# Patient Record
Sex: MALE | Race: WHITE | NOT HISPANIC OR LATINO | Employment: OTHER | URBAN - METROPOLITAN AREA
[De-identification: names, ages, dates, MRNs, and addresses within clinical notes are randomized per-mention and may not be internally consistent; named-entity substitution may affect disease eponyms.]

---

## 2017-01-03 ENCOUNTER — HISTORICAL (OUTPATIENT)
Dept: LAB | Facility: HOSPITAL | Age: 67
End: 2017-01-03

## 2017-03-06 ENCOUNTER — HISTORICAL (OUTPATIENT)
Dept: RADIOLOGY | Facility: HOSPITAL | Age: 67
End: 2017-03-06

## 2017-03-07 ENCOUNTER — HISTORICAL (OUTPATIENT)
Dept: RADIOLOGY | Facility: HOSPITAL | Age: 67
End: 2017-03-07

## 2017-03-09 ENCOUNTER — HISTORICAL (OUTPATIENT)
Dept: LAB | Facility: HOSPITAL | Age: 67
End: 2017-03-09

## 2017-03-10 ENCOUNTER — HISTORICAL (OUTPATIENT)
Dept: CARDIOLOGY | Facility: HOSPITAL | Age: 67
End: 2017-03-10

## 2018-01-29 ENCOUNTER — HISTORICAL (OUTPATIENT)
Dept: LAB | Facility: HOSPITAL | Age: 68
End: 2018-01-29

## 2018-01-29 LAB
ABS NEUT (OLG): 4.8 X10(3)/MCL (ref 2.1–9.2)
ALBUMIN SERPL-MCNC: 3.9 GM/DL (ref 3.4–5)
ALP SERPL-CCNC: 64 UNIT/L (ref 46–116)
ALT SERPL-CCNC: 34 UNIT/L (ref 12–78)
ANISOCYTOSIS BLD QL SMEAR: NORMAL
AST SERPL-CCNC: 15 UNIT/L (ref 15–37)
BILIRUB SERPL-MCNC: 0.4 MG/DL (ref 0.2–1)
BILIRUBIN DIRECT+TOT PNL SERPL-MCNC: 0.17 MG/DL (ref 0–0.2)
BILIRUBIN DIRECT+TOT PNL SERPL-MCNC: 0.28 MG/DL (ref 0–0.8)
BUN SERPL-MCNC: 15.4 MG/DL (ref 7–18)
CALCIUM SERPL-MCNC: 9.2 MG/DL (ref 8.5–10.1)
CHLORIDE SERPL-SCNC: 103 MMOL/L (ref 98–107)
CHOLEST SERPL-MCNC: 84 MG/DL (ref 0–200)
CHOLEST/HDLC SERPL: 2.5 {RATIO} (ref 0–5)
CO2 SERPL-SCNC: 26.8 MMOL/L (ref 21–32)
CREAT SERPL-MCNC: 0.9 MG/DL (ref 0.6–1.3)
ERYTHROCYTE [DISTWIDTH] IN BLOOD BY AUTOMATED COUNT: 20.2 % (ref 11.5–17)
EST. AVERAGE GLUCOSE BLD GHB EST-MCNC: 131 MG/DL
FT4I SERPL CALC-MCNC: 1.75
GLUCOSE SERPL-MCNC: 97 MG/DL (ref 74–106)
HBA1C MFR BLD: 6.2 % (ref 4.5–6.2)
HCT VFR BLD AUTO: 32.5 % (ref 42–52)
HDLC SERPL-MCNC: 33 MG/DL (ref 40–60)
HGB BLD-MCNC: 9.8 GM/DL (ref 14–18)
HYPOCHROMIA BLD QL SMEAR: NORMAL
IRON SERPL-MCNC: 19 MCG/DL (ref 50–175)
LDLC SERPL CALC-MCNC: 41 MG/DL (ref 0–129)
MCH RBC QN AUTO: 21.6 PG (ref 27–31)
MCHC RBC AUTO-ENTMCNC: 30.1 GM/DL (ref 33–36)
MCV RBC AUTO: 71.7 FL (ref 80–94)
PLATELET # BLD AUTO: 304 X10(3)/MCL (ref 130–400)
PLATELET # BLD EST: ADEQUATE 10*3/UL
PMV BLD AUTO: 8.1 FL (ref 7.4–10.4)
POTASSIUM SERPL-SCNC: 4.5 MMOL/L (ref 3.5–5.1)
PROT SERPL-MCNC: 7.4 GM/DL (ref 6.4–8.2)
RBC # BLD AUTO: 4.53 X10(6)/MCL (ref 4.7–6.1)
SODIUM SERPL-SCNC: 141 MMOL/L (ref 136–145)
T3RU NFR SERPL: 33 % (ref 31–39)
T4 SERPL-MCNC: 5.3 MCG/DL (ref 4.7–13.3)
TRIGL SERPL-MCNC: 51 MG/DL
TSH SERPL-ACNC: 1.21 MIU/ML (ref 0.36–3.74)
VLDLC SERPL CALC-MCNC: 10 MG/DL
WBC # SPEC AUTO: 7.7 X10(3)/MCL (ref 4.5–11.5)

## 2018-04-16 ENCOUNTER — HISTORICAL (OUTPATIENT)
Dept: RESPIRATORY THERAPY | Facility: HOSPITAL | Age: 68
End: 2018-04-16

## 2018-04-24 ENCOUNTER — HISTORICAL (OUTPATIENT)
Dept: RADIOLOGY | Facility: HOSPITAL | Age: 68
End: 2018-04-24

## 2018-05-01 ENCOUNTER — HISTORICAL (OUTPATIENT)
Dept: LAB | Facility: HOSPITAL | Age: 68
End: 2018-05-01

## 2018-05-01 LAB
ERYTHROCYTE [DISTWIDTH] IN BLOOD BY AUTOMATED COUNT: 22.1 % (ref 11.5–17)
HCT VFR BLD AUTO: 40.1 % (ref 42–52)
HGB BLD-MCNC: 12.9 GM/DL (ref 14–18)
IRON SERPL-MCNC: 31 MCG/DL (ref 50–175)
MCH RBC QN AUTO: 25.6 PG (ref 27–31)
MCHC RBC AUTO-ENTMCNC: 32.1 GM/DL (ref 33–36)
MCV RBC AUTO: 79.7 FL (ref 80–94)
PLATELET # BLD AUTO: 207 X10(3)/MCL (ref 130–400)
PMV BLD AUTO: 8.8 FL (ref 7.4–10.4)
RBC # BLD AUTO: 5.04 X10(6)/MCL (ref 4.7–6.1)
WBC # SPEC AUTO: 9.7 X10(3)/MCL (ref 4.5–11.5)

## 2018-08-20 ENCOUNTER — HISTORICAL (OUTPATIENT)
Dept: RADIOLOGY | Facility: HOSPITAL | Age: 68
End: 2018-08-20

## 2018-09-06 ENCOUNTER — HISTORICAL (OUTPATIENT)
Dept: CARDIOLOGY | Facility: HOSPITAL | Age: 68
End: 2018-09-06

## 2018-09-06 LAB
ABS NEUT (OLG): 5.05 X10(3)/MCL (ref 2.1–9.2)
APTT PPP: 28 SECOND(S) (ref 24.8–36.9)
BASOPHILS # BLD AUTO: 0.1 X10(3)/MCL (ref 0–0.2)
BASOPHILS NFR BLD AUTO: 1 %
BUN SERPL-MCNC: 15 MG/DL (ref 7–18)
CALCIUM SERPL-MCNC: 8.8 MG/DL (ref 8.5–10.1)
CHLORIDE SERPL-SCNC: 100 MMOL/L (ref 98–107)
CO2 SERPL-SCNC: 33 MMOL/L (ref 21–32)
CREAT SERPL-MCNC: 0.85 MG/DL (ref 0.7–1.3)
CREAT/UREA NIT SERPL: 17.6
EOSINOPHIL # BLD AUTO: 0.2 X10(3)/MCL (ref 0–0.9)
EOSINOPHIL NFR BLD AUTO: 2 %
ERYTHROCYTE [DISTWIDTH] IN BLOOD BY AUTOMATED COUNT: 17.5 % (ref 11.5–17)
GLUCOSE SERPL-MCNC: 121 MG/DL (ref 74–106)
HCT VFR BLD AUTO: 44.5 % (ref 42–52)
HGB BLD-MCNC: 13.4 GM/DL (ref 14–18)
INR PPP: 1.04 (ref 0–1.27)
LYMPHOCYTES # BLD AUTO: 2.1 X10(3)/MCL (ref 0.6–4.6)
LYMPHOCYTES NFR BLD AUTO: 26 %
MCH RBC QN AUTO: 28.9 PG (ref 27–31)
MCHC RBC AUTO-ENTMCNC: 30.1 GM/DL (ref 33–36)
MCV RBC AUTO: 95.9 FL (ref 80–94)
MONOCYTES # BLD AUTO: 0.6 X10(3)/MCL (ref 0.1–1.3)
MONOCYTES NFR BLD AUTO: 8 %
NEUTROPHILS # BLD AUTO: 5.05 X10(3)/MCL (ref 1.4–7.9)
NEUTROPHILS NFR BLD AUTO: 62 %
PLATELET # BLD AUTO: 215 X10(3)/MCL (ref 130–400)
PMV BLD AUTO: 9.9 FL (ref 9.4–12.4)
POTASSIUM SERPL-SCNC: 4 MMOL/L (ref 3.5–5.1)
PROTHROMBIN TIME: 13.9 SECOND(S) (ref 12.2–14.7)
RBC # BLD AUTO: 4.64 X10(6)/MCL (ref 4.7–6.1)
SODIUM SERPL-SCNC: 140 MMOL/L (ref 136–145)
WBC # SPEC AUTO: 8.1 X10(3)/MCL (ref 4.5–11.5)

## 2019-03-15 ENCOUNTER — HISTORICAL (OUTPATIENT)
Dept: LAB | Facility: HOSPITAL | Age: 69
End: 2019-03-15

## 2019-03-15 LAB
ALBUMIN SERPL-MCNC: 3.8 GM/DL (ref 3.4–5)
ALP SERPL-CCNC: 62 UNIT/L (ref 46–116)
ALT SERPL-CCNC: 44 UNIT/L (ref 12–78)
AST SERPL-CCNC: 13 UNIT/L (ref 15–37)
BILIRUB SERPL-MCNC: 0.4 MG/DL (ref 0.2–1)
BILIRUBIN DIRECT+TOT PNL SERPL-MCNC: 0.16 MG/DL (ref 0–0.2)
BILIRUBIN DIRECT+TOT PNL SERPL-MCNC: 0.24 MG/DL (ref 0–0.8)
BUN SERPL-MCNC: 12.2 MG/DL (ref 7–18)
CALCIUM SERPL-MCNC: 9.8 MG/DL (ref 8.5–10.1)
CHLORIDE SERPL-SCNC: 103 MMOL/L (ref 98–107)
CHOLEST SERPL-MCNC: 99 MG/DL (ref 0–200)
CHOLEST/HDLC SERPL: 2.9 {RATIO} (ref 0–5)
CO2 SERPL-SCNC: 29.5 MMOL/L (ref 21–32)
CREAT SERPL-MCNC: 0.87 MG/DL (ref 0.6–1.3)
CREAT/UREA NIT SERPL: 14
ERYTHROCYTE [DISTWIDTH] IN BLOOD BY AUTOMATED COUNT: 15.5 % (ref 11.5–17)
EST. AVERAGE GLUCOSE BLD GHB EST-MCNC: 140 MG/DL
FT4I SERPL CALC-MCNC: 2.05
GLUCOSE SERPL-MCNC: 106 MG/DL (ref 74–106)
HBA1C MFR BLD: 6.5 % (ref 4.5–6.2)
HCT VFR BLD AUTO: 44 % (ref 42–52)
HDLC SERPL-MCNC: 34 MG/DL (ref 40–60)
HGB BLD-MCNC: 14.8 GM/DL (ref 14–18)
LDLC SERPL CALC-MCNC: 56 MG/DL (ref 0–129)
MCH RBC QN AUTO: 31.9 PG (ref 27–31)
MCHC RBC AUTO-ENTMCNC: 33.6 GM/DL (ref 33–36)
MCV RBC AUTO: 94.8 FL (ref 80–94)
PLATELET # BLD AUTO: 163 X10(3)/MCL (ref 130–400)
PMV BLD AUTO: 10.3 FL (ref 9.4–12.4)
POTASSIUM SERPL-SCNC: 4.5 MMOL/L (ref 3.5–5.1)
PROT SERPL-MCNC: 7.3 GM/DL (ref 6.4–8.2)
RBC # BLD AUTO: 4.64 X10(6)/MCL (ref 4.7–6.1)
SODIUM SERPL-SCNC: 142 MMOL/L (ref 136–145)
T3RU NFR SERPL: 33 % (ref 31–39)
T4 SERPL-MCNC: 6.2 MCG/DL (ref 4.7–13.3)
TRIGL SERPL-MCNC: 45 MG/DL
TSH SERPL-ACNC: 1.02 MIU/ML (ref 0.36–3.74)
VLDLC SERPL CALC-MCNC: 9 MG/DL
WBC # SPEC AUTO: 8.6 X10(3)/MCL (ref 4.5–11.5)

## 2019-04-04 ENCOUNTER — HISTORICAL (OUTPATIENT)
Dept: LAB | Facility: HOSPITAL | Age: 69
End: 2019-04-04

## 2019-04-04 LAB
BUN SERPL-MCNC: 13.5 MG/DL (ref 7–18)
CALCIUM SERPL-MCNC: 9.1 MG/DL (ref 8.5–10.1)
CHLORIDE SERPL-SCNC: 101 MMOL/L (ref 98–107)
CO2 SERPL-SCNC: 28.7 MMOL/L (ref 21–32)
CREAT SERPL-MCNC: 1.04 MG/DL (ref 0.6–1.3)
CREAT/UREA NIT SERPL: 13
ERYTHROCYTE [DISTWIDTH] IN BLOOD BY AUTOMATED COUNT: 15.4 % (ref 11.5–17)
GLUCOSE SERPL-MCNC: 89 MG/DL (ref 74–106)
HCT VFR BLD AUTO: 44.4 % (ref 42–52)
HGB BLD-MCNC: 14.6 GM/DL (ref 14–18)
MCH RBC QN AUTO: 31.4 PG (ref 27–31)
MCHC RBC AUTO-ENTMCNC: 32.9 GM/DL (ref 33–36)
MCV RBC AUTO: 95.5 FL (ref 80–94)
PLATELET # BLD AUTO: 175 X10(3)/MCL (ref 130–400)
PMV BLD AUTO: 9.9 FL (ref 9.4–12.4)
POTASSIUM SERPL-SCNC: 4.3 MMOL/L (ref 3.5–5.1)
RBC # BLD AUTO: 4.65 X10(6)/MCL (ref 4.7–6.1)
SODIUM SERPL-SCNC: 140 MMOL/L (ref 136–145)
WBC # SPEC AUTO: 9 X10(3)/MCL (ref 4.5–11.5)

## 2019-04-09 ENCOUNTER — HISTORICAL (OUTPATIENT)
Dept: CARDIOLOGY | Facility: HOSPITAL | Age: 69
End: 2019-04-09

## 2019-04-09 LAB
APTT PPP: 25.8 SECOND(S) (ref 24.2–33.9)
INR PPP: 1.2 (ref 0–1.3)
PROTHROMBIN TIME: 15.3 SECOND(S) (ref 12–14)

## 2019-04-26 ENCOUNTER — HISTORICAL (OUTPATIENT)
Dept: LAB | Facility: HOSPITAL | Age: 69
End: 2019-04-26

## 2019-04-26 LAB
BUN SERPL-MCNC: 12 MG/DL (ref 7–18)
CALCIUM SERPL-MCNC: 9.4 MG/DL (ref 8.5–10.1)
CHLORIDE SERPL-SCNC: 101 MMOL/L (ref 98–107)
CO2 SERPL-SCNC: 28.7 MMOL/L (ref 21–32)
CREAT SERPL-MCNC: 0.96 MG/DL (ref 0.6–1.3)
CREAT/UREA NIT SERPL: 12
ERYTHROCYTE [DISTWIDTH] IN BLOOD BY AUTOMATED COUNT: 16.4 % (ref 11.5–17)
GLUCOSE SERPL-MCNC: 171 MG/DL (ref 74–106)
HCT VFR BLD AUTO: 41.4 % (ref 42–52)
HGB BLD-MCNC: 13.6 GM/DL (ref 14–18)
MCH RBC QN AUTO: 31.9 PG (ref 27–31)
MCHC RBC AUTO-ENTMCNC: 32.9 GM/DL (ref 33–36)
MCV RBC AUTO: 97 FL (ref 80–94)
PLATELET # BLD AUTO: 213 X10(3)/MCL (ref 130–400)
PMV BLD AUTO: 9.7 FL (ref 9.4–12.4)
POTASSIUM SERPL-SCNC: 4.4 MMOL/L (ref 3.5–5.1)
RBC # BLD AUTO: 4.27 X10(6)/MCL (ref 4.7–6.1)
SODIUM SERPL-SCNC: 140 MMOL/L (ref 136–145)
WBC # SPEC AUTO: 8.4 X10(3)/MCL (ref 4.5–11.5)

## 2019-05-08 ENCOUNTER — HOSPITAL ENCOUNTER (OUTPATIENT)
Dept: MEDSURG UNIT | Facility: HOSPITAL | Age: 69
End: 2019-05-09
Attending: INTERNAL MEDICINE | Admitting: INTERNAL MEDICINE

## 2019-05-08 LAB
APTT PPP: 24.9 SECOND(S) (ref 24.2–33.9)
INR PPP: 1.1 (ref 0–1.3)
PROTHROMBIN TIME: 14.7 SECOND(S) (ref 12–14)

## 2019-05-09 LAB
ABS NEUT (OLG): 3.95 X10(3)/MCL (ref 2.1–9.2)
ALBUMIN SERPL-MCNC: 3 GM/DL (ref 3.4–5)
ALBUMIN/GLOB SERPL: 1 RATIO (ref 1.1–2)
ALP SERPL-CCNC: 54 UNIT/L (ref 50–136)
ALT SERPL-CCNC: 46 UNIT/L (ref 12–78)
AST SERPL-CCNC: 35 UNIT/L (ref 15–37)
BASOPHILS # BLD AUTO: 0 X10(3)/MCL (ref 0–0.2)
BASOPHILS NFR BLD AUTO: 1 %
BILIRUB SERPL-MCNC: 0.4 MG/DL (ref 0.2–1)
BILIRUBIN DIRECT+TOT PNL SERPL-MCNC: 0.2 MG/DL (ref 0–0.5)
BILIRUBIN DIRECT+TOT PNL SERPL-MCNC: 0.2 MG/DL (ref 0–0.8)
BUN SERPL-MCNC: 15 MG/DL (ref 7–18)
CALCIUM SERPL-MCNC: 8.2 MG/DL (ref 8.5–10.1)
CHLORIDE SERPL-SCNC: 108 MMOL/L (ref 98–107)
CO2 SERPL-SCNC: 26 MMOL/L (ref 21–32)
CREAT SERPL-MCNC: 0.72 MG/DL (ref 0.7–1.3)
EOSINOPHIL # BLD AUTO: 0.2 X10(3)/MCL (ref 0–0.9)
EOSINOPHIL NFR BLD AUTO: 2 %
ERYTHROCYTE [DISTWIDTH] IN BLOOD BY AUTOMATED COUNT: 15.7 % (ref 11.5–17)
GLOBULIN SER-MCNC: 3.1 GM/DL (ref 2.4–3.5)
GLUCOSE SERPL-MCNC: 75 MG/DL (ref 74–106)
HCT VFR BLD AUTO: 40.5 % (ref 42–52)
HGB BLD-MCNC: 12.5 GM/DL (ref 14–18)
LYMPHOCYTES # BLD AUTO: 1.9 X10(3)/MCL (ref 0.6–4.6)
LYMPHOCYTES NFR BLD AUTO: 28 %
MCH RBC QN AUTO: 31.3 PG (ref 27–31)
MCHC RBC AUTO-ENTMCNC: 30.9 GM/DL (ref 33–36)
MCV RBC AUTO: 101.5 FL (ref 80–94)
MONOCYTES # BLD AUTO: 0.7 X10(3)/MCL (ref 0.1–1.3)
MONOCYTES NFR BLD AUTO: 10 %
NEUTROPHILS # BLD AUTO: 3.95 X10(3)/MCL (ref 2.1–9.2)
NEUTROPHILS NFR BLD AUTO: 59 %
PLATELET # BLD AUTO: 155 X10(3)/MCL (ref 130–400)
PMV BLD AUTO: 11 FL (ref 9.4–12.4)
POTASSIUM SERPL-SCNC: 4.2 MMOL/L (ref 3.5–5.1)
PROT SERPL-MCNC: 6.1 GM/DL (ref 6.4–8.2)
RBC # BLD AUTO: 3.99 X10(6)/MCL (ref 4.7–6.1)
SODIUM SERPL-SCNC: 140 MMOL/L (ref 136–145)
WBC # SPEC AUTO: 6.7 X10(3)/MCL (ref 4.5–11.5)

## 2019-10-10 ENCOUNTER — HISTORICAL (OUTPATIENT)
Dept: LAB | Facility: HOSPITAL | Age: 69
End: 2019-10-10

## 2019-10-10 LAB
ABS NEUT (OLG): 5.7 X10(3)/MCL (ref 2.1–9.2)
ALBUMIN SERPL-MCNC: 3.7 GM/DL (ref 3.4–5)
ALP SERPL-CCNC: 53 UNIT/L (ref 46–116)
ALT SERPL-CCNC: 23 UNIT/L (ref 12–78)
AST SERPL-CCNC: 20 UNIT/L (ref 15–37)
BASOPHILS # BLD AUTO: 0 X10(3)/MCL (ref 0–0.2)
BASOPHILS NFR BLD AUTO: 0 %
BILIRUB SERPL-MCNC: 0.4 MG/DL (ref 0.2–1)
BILIRUBIN DIRECT+TOT PNL SERPL-MCNC: 0.14 MG/DL (ref 0–0.2)
BILIRUBIN DIRECT+TOT PNL SERPL-MCNC: 0.26 MG/DL (ref 0–0.8)
BUN SERPL-MCNC: 11.9 MG/DL (ref 7–18)
CALCIUM SERPL-MCNC: 9.2 MG/DL (ref 8.5–10.1)
CHLORIDE SERPL-SCNC: 103 MMOL/L (ref 98–107)
CHOLEST SERPL-MCNC: 105 MG/DL (ref 0–200)
CHOLEST/HDLC SERPL: 3.1 {RATIO} (ref 0–5)
CO2 SERPL-SCNC: 27.3 MMOL/L (ref 21–32)
CREAT SERPL-MCNC: 0.85 MG/DL (ref 0.6–1.3)
CREAT/UREA NIT SERPL: 14
EOSINOPHIL # BLD AUTO: 0.1 X10(3)/MCL (ref 0–0.9)
EOSINOPHIL NFR BLD AUTO: 1 %
ERYTHROCYTE [DISTWIDTH] IN BLOOD BY AUTOMATED COUNT: 15.3 % (ref 11.5–17)
EST. AVERAGE GLUCOSE BLD GHB EST-MCNC: 120 MG/DL
FT4I SERPL CALC-MCNC: 2.2
GLUCOSE SERPL-MCNC: 87 MG/DL (ref 74–106)
HBA1C MFR BLD: 5.8 % (ref 4.5–6.2)
HCT VFR BLD AUTO: 42.1 % (ref 42–52)
HDLC SERPL-MCNC: 34 MG/DL (ref 40–60)
HGB BLD-MCNC: 13.2 GM/DL (ref 14–18)
IMM GRANULOCYTES # BLD AUTO: 0.02 % (ref 0–0.02)
IMM GRANULOCYTES NFR BLD AUTO: 0.2 % (ref 0–0.43)
LDLC SERPL CALC-MCNC: 61 MG/DL (ref 0–129)
LYMPHOCYTES # BLD AUTO: 2.4 X10(3)/MCL (ref 0.6–4.6)
LYMPHOCYTES NFR BLD AUTO: 27 %
MCH RBC QN AUTO: 30.3 PG (ref 27–31)
MCHC RBC AUTO-ENTMCNC: 31.4 GM/DL (ref 33–36)
MCV RBC AUTO: 96.6 FL (ref 80–94)
MONOCYTES # BLD AUTO: 0.7 X10(3)/MCL (ref 0.1–1.3)
MONOCYTES NFR BLD AUTO: 8 %
NEUTROPHILS # BLD AUTO: 5.7 X10(3)/MCL (ref 1.4–7.9)
NEUTROPHILS NFR BLD AUTO: 64 %
PLATELET # BLD AUTO: 186 X10(3)/MCL (ref 130–400)
PMV BLD AUTO: 10.7 FL (ref 9.4–12.4)
POTASSIUM SERPL-SCNC: 4.3 MMOL/L (ref 3.5–5.1)
PROT SERPL-MCNC: 7.3 GM/DL (ref 6.4–8.2)
PSA SERPL-MCNC: 0.76 NG/ML (ref 0–4)
RBC # BLD AUTO: 4.36 X10(6)/MCL (ref 4.7–6.1)
SODIUM SERPL-SCNC: 142 MMOL/L (ref 136–145)
T3RU NFR SERPL: 35 % (ref 31–39)
T4 SERPL-MCNC: 6.3 MCG/DL (ref 4.7–13.3)
TRIGL SERPL-MCNC: 49 MG/DL
TSH SERPL-ACNC: 0.68 MIU/ML (ref 0.36–3.74)
VLDLC SERPL CALC-MCNC: 10 MG/DL
WBC # SPEC AUTO: 9 X10(3)/MCL (ref 4.5–11.5)

## 2020-06-09 ENCOUNTER — HISTORICAL (OUTPATIENT)
Dept: LAB | Facility: HOSPITAL | Age: 70
End: 2020-06-09

## 2020-06-09 LAB
ABS NEUT (OLG): 5.53 X10(3)/MCL (ref 2.1–9.2)
ALBUMIN SERPL-MCNC: 3.8 GM/DL (ref 3.4–5)
ALP SERPL-CCNC: 56 UNIT/L (ref 46–116)
ALT SERPL-CCNC: 28 UNIT/L (ref 12–78)
AST SERPL-CCNC: 23 UNIT/L (ref 15–37)
BASOPHILS # BLD AUTO: 0 X10(3)/MCL (ref 0–0.2)
BASOPHILS NFR BLD AUTO: 0 %
BILIRUB SERPL-MCNC: 0.4 MG/DL (ref 0.2–1)
BILIRUBIN DIRECT+TOT PNL SERPL-MCNC: 0.16 MG/DL (ref 0–0.2)
BILIRUBIN DIRECT+TOT PNL SERPL-MCNC: 0.24 MG/DL (ref 0–0.8)
BUN SERPL-MCNC: 13 MG/DL (ref 7–18)
CALCIUM SERPL-MCNC: 8.7 MG/DL (ref 8.5–10.1)
CHLORIDE SERPL-SCNC: 102 MMOL/L (ref 98–107)
CHOLEST SERPL-MCNC: 124 MG/DL (ref 0–200)
CHOLEST/HDLC SERPL: 3.5 {RATIO} (ref 0–5)
CO2 SERPL-SCNC: 25.6 MMOL/L (ref 21–32)
CREAT SERPL-MCNC: 0.78 MG/DL (ref 0.6–1.3)
CREAT/UREA NIT SERPL: 17
EOSINOPHIL # BLD AUTO: 0.2 X10(3)/MCL (ref 0–0.9)
EOSINOPHIL NFR BLD AUTO: 2 %
ERYTHROCYTE [DISTWIDTH] IN BLOOD BY AUTOMATED COUNT: 14.8 % (ref 11.5–17)
EST. AVERAGE GLUCOSE BLD GHB EST-MCNC: 128 MG/DL
FT4I SERPL CALC-MCNC: 1.84
GLUCOSE SERPL-MCNC: 95 MG/DL (ref 74–106)
HBA1C MFR BLD: 6.1 % (ref 4.5–6.2)
HCT VFR BLD AUTO: 42.3 % (ref 42–52)
HDLC SERPL-MCNC: 35 MG/DL (ref 40–60)
HGB BLD-MCNC: 13.7 GM/DL (ref 14–18)
IMM GRANULOCYTES # BLD AUTO: 0.01 % (ref 0–0.02)
IMM GRANULOCYTES NFR BLD AUTO: 0.1 % (ref 0–0.43)
IRON SERPL-MCNC: 62 MCG/DL (ref 50–175)
LDLC SERPL CALC-MCNC: 77 MG/DL (ref 0–129)
LYMPHOCYTES # BLD AUTO: 2 X10(3)/MCL (ref 0.6–4.6)
LYMPHOCYTES NFR BLD AUTO: 23 %
MCH RBC QN AUTO: 31.7 PG (ref 27–31)
MCHC RBC AUTO-ENTMCNC: 32.4 GM/DL (ref 33–36)
MCV RBC AUTO: 97.9 FL (ref 80–94)
MONOCYTES # BLD AUTO: 0.8 X10(3)/MCL (ref 0.1–1.3)
MONOCYTES NFR BLD AUTO: 9 %
NEUTROPHILS # BLD AUTO: 5.53 X10(3)/MCL (ref 1.4–7.9)
NEUTROPHILS NFR BLD AUTO: 66 %
PLATELET # BLD AUTO: 179 X10(3)/MCL (ref 130–400)
PMV BLD AUTO: 11.6 FL (ref 9.4–12.4)
POTASSIUM SERPL-SCNC: 4.5 MMOL/L (ref 3.5–5.1)
PROT SERPL-MCNC: 7.3 GM/DL (ref 6.4–8.2)
RBC # BLD AUTO: 4.32 X10(6)/MCL (ref 4.7–6.1)
SODIUM SERPL-SCNC: 140 MMOL/L (ref 136–145)
T3RU NFR SERPL: 34 % (ref 31–39)
T4 SERPL-MCNC: 5.4 MCG/DL (ref 4.7–13.3)
TRIGL SERPL-MCNC: 62 MG/DL
TSH SERPL-ACNC: 1.18 MIU/ML (ref 0.36–3.74)
VLDLC SERPL CALC-MCNC: 12 MG/DL
WBC # SPEC AUTO: 8.4 X10(3)/MCL (ref 4.5–11.5)

## 2020-06-24 ENCOUNTER — HISTORICAL (OUTPATIENT)
Dept: CARDIOLOGY | Facility: HOSPITAL | Age: 70
End: 2020-06-24

## 2021-07-06 ENCOUNTER — HISTORICAL (OUTPATIENT)
Dept: LAB | Facility: HOSPITAL | Age: 71
End: 2021-07-06

## 2021-07-06 LAB
ABS NEUT (OLG): 6.56 X10(3)/MCL (ref 2.1–9.2)
ALBUMIN SERPL-MCNC: 3.8 GM/DL (ref 3.4–4.8)
ALP SERPL-CCNC: 82 UNIT/L (ref 40–150)
ALT SERPL-CCNC: 22 UNIT/L (ref 0–55)
AST SERPL-CCNC: 18 UNIT/L (ref 5–34)
BASOPHILS # BLD AUTO: 0 X10(3)/MCL (ref 0–0.2)
BASOPHILS NFR BLD AUTO: 0 %
BILIRUB SERPL-MCNC: 0.5 MG/DL
BILIRUBIN DIRECT+TOT PNL SERPL-MCNC: 0.2 MG/DL (ref 0–0.8)
BILIRUBIN DIRECT+TOT PNL SERPL-MCNC: 0.3 MG/DL (ref 0–0.5)
BUN SERPL-MCNC: 14.1 MG/DL (ref 8.4–25.7)
CALCIUM SERPL-MCNC: 8.6 MG/DL (ref 8.8–10)
CHLORIDE SERPL-SCNC: 100 MMOL/L (ref 98–107)
CHOLEST SERPL-MCNC: 115 MG/DL
CHOLEST/HDLC SERPL: 3 {RATIO} (ref 0–5)
CO2 SERPL-SCNC: 27 MMOL/L (ref 23–31)
CREAT SERPL-MCNC: 0.9 MG/DL (ref 0.73–1.18)
CREAT/UREA NIT SERPL: 16
EOSINOPHIL # BLD AUTO: 0.3 X10(3)/MCL (ref 0–0.9)
EOSINOPHIL NFR BLD AUTO: 3 %
ERYTHROCYTE [DISTWIDTH] IN BLOOD BY AUTOMATED COUNT: 14.8 % (ref 11.5–17)
EST. AVERAGE GLUCOSE BLD GHB EST-MCNC: 128.4 MG/DL
FT4I SERPL CALC-MCNC: 1.84 (ref 2.6–3.6)
GLUCOSE SERPL-MCNC: 93 MG/DL (ref 82–115)
HBA1C MFR BLD: 6.1 %
HCT VFR BLD AUTO: 47.2 % (ref 42–52)
HDLC SERPL-MCNC: 37 MG/DL (ref 35–60)
HGB BLD-MCNC: 14.6 GM/DL (ref 14–18)
IMM GRANULOCYTES # BLD AUTO: 0.02 % (ref 0–0.02)
IMM GRANULOCYTES NFR BLD AUTO: 0.2 % (ref 0–0.43)
IRON SERPL-MCNC: 58 UG/DL (ref 65–175)
LDLC SERPL CALC-MCNC: 63 MG/DL (ref 50–140)
LYMPHOCYTES # BLD AUTO: 1.4 X10(3)/MCL (ref 0.6–4.6)
LYMPHOCYTES NFR BLD AUTO: 15 %
MCH RBC QN AUTO: 30.9 PG (ref 27–31)
MCHC RBC AUTO-ENTMCNC: 30.9 GM/DL (ref 33–36)
MCV RBC AUTO: 100 FL (ref 80–94)
MONOCYTES # BLD AUTO: 0.9 X10(3)/MCL (ref 0.1–1.3)
MONOCYTES NFR BLD AUTO: 10 %
NEUTROPHILS # BLD AUTO: 6.56 X10(3)/MCL (ref 1.4–7.9)
NEUTROPHILS NFR BLD AUTO: 72 %
PLATELET # BLD AUTO: 160 X10(3)/MCL (ref 130–400)
PMV BLD AUTO: 11 FL (ref 9.4–12.4)
POTASSIUM SERPL-SCNC: 4.7 MMOL/L (ref 3.5–5.1)
PROT SERPL-MCNC: 6.8 GM/DL (ref 5.8–7.6)
PSA SERPL-MCNC: 7.17 NG/ML
RBC # BLD AUTO: 4.72 X10(6)/MCL (ref 4.7–6.1)
SODIUM SERPL-SCNC: 139 MMOL/L (ref 136–145)
T3RU NFR SERPL: 34.4 % (ref 31–39)
T4 SERPL-MCNC: 5.34 UG/DL (ref 4.87–11.72)
TRIGL SERPL-MCNC: 77 MG/DL (ref 34–140)
TSH SERPL-ACNC: 1.12 UIU/ML (ref 0.35–4.94)
VLDLC SERPL CALC-MCNC: 15 MG/DL
WBC # SPEC AUTO: 9.2 X10(3)/MCL (ref 4.5–11.5)

## 2022-04-11 ENCOUNTER — HISTORICAL (OUTPATIENT)
Dept: ADMINISTRATIVE | Facility: HOSPITAL | Age: 72
End: 2022-04-11

## 2022-04-27 VITALS
DIASTOLIC BLOOD PRESSURE: 72 MMHG | WEIGHT: 191.13 LBS | SYSTOLIC BLOOD PRESSURE: 117 MMHG | BODY MASS INDEX: 30.72 KG/M2 | HEIGHT: 66 IN

## 2022-07-18 ENCOUNTER — LAB VISIT (OUTPATIENT)
Dept: LAB | Facility: HOSPITAL | Age: 72
End: 2022-07-18
Attending: FAMILY MEDICINE
Payer: MEDICARE

## 2022-07-18 DIAGNOSIS — Z12.5 SPECIAL SCREENING, PROSTATE CANCER: ICD-10-CM

## 2022-07-18 DIAGNOSIS — E11.69 TYPE 2 DIABETES MELLITUS WITH HYPERLIPIDEMIA: ICD-10-CM

## 2022-07-18 DIAGNOSIS — K21.9 GASTROESOPHAGEAL REFLUX DISEASE, UNSPECIFIED WHETHER ESOPHAGITIS PRESENT: ICD-10-CM

## 2022-07-18 DIAGNOSIS — E78.5 HYPERLIPIDEMIA, UNSPECIFIED HYPERLIPIDEMIA TYPE: ICD-10-CM

## 2022-07-18 DIAGNOSIS — I10 HYPERTENSION, UNSPECIFIED TYPE: ICD-10-CM

## 2022-07-18 DIAGNOSIS — I65.29 CAROTID ARTERY STENOSIS: ICD-10-CM

## 2022-07-18 DIAGNOSIS — D64.9 ANEMIA, UNSPECIFIED TYPE: Primary | ICD-10-CM

## 2022-07-18 DIAGNOSIS — E78.5 TYPE 2 DIABETES MELLITUS WITH HYPERLIPIDEMIA: ICD-10-CM

## 2022-07-18 LAB
ALBUMIN SERPL-MCNC: 3.6 GM/DL (ref 3.4–4.8)
ALP SERPL-CCNC: 69 UNIT/L (ref 40–150)
ALT SERPL-CCNC: 19 UNIT/L (ref 0–55)
ANION GAP SERPL CALC-SCNC: 13 MEQ/L
AST SERPL-CCNC: 14 UNIT/L (ref 5–34)
BASOPHILS # BLD AUTO: 0.04 X10(3)/MCL (ref 0–0.2)
BASOPHILS NFR BLD AUTO: 0.5 %
BILIRUBIN DIRECT+TOT PNL SERPL-MCNC: 0.2 MG/DL (ref 0–0.5)
BILIRUBIN DIRECT+TOT PNL SERPL-MCNC: 0.3 MG/DL (ref 0–0.8)
BILIRUBIN DIRECT+TOT PNL SERPL-MCNC: 0.5 MG/DL
BUN SERPL-MCNC: 13.1 MG/DL (ref 8.4–25.7)
CALCIUM SERPL-MCNC: 9.5 MG/DL (ref 8.8–10)
CHLORIDE SERPL-SCNC: 98 MMOL/L (ref 98–107)
CHOLEST SERPL-MCNC: 220 MG/DL
CHOLEST/HDLC SERPL: 6 {RATIO} (ref 0–5)
CO2 SERPL-SCNC: 27 MMOL/L (ref 23–31)
CREAT SERPL-MCNC: 0.86 MG/DL (ref 0.73–1.18)
CREAT/UREA NIT SERPL: 15
EOSINOPHIL # BLD AUTO: 0.21 X10(3)/MCL (ref 0–0.9)
EOSINOPHIL NFR BLD AUTO: 2.5 %
ERYTHROCYTE [DISTWIDTH] IN BLOOD BY AUTOMATED COUNT: 14.5 % (ref 11.5–17)
EST. AVERAGE GLUCOSE BLD GHB EST-MCNC: 116.9 MG/DL
FT4I SERPL CALC-MCNC: 2.37 (ref 2.6–3.6)
GLUCOSE SERPL-MCNC: 112 MG/DL (ref 82–115)
HBA1C MFR BLD: 5.7 %
HCT VFR BLD AUTO: 44.2 % (ref 42–52)
HDLC SERPL-MCNC: 36 MG/DL (ref 35–60)
HGB BLD-MCNC: 14.2 GM/DL (ref 14–18)
IMM GRANULOCYTES # BLD AUTO: 0.02 X10(3)/MCL (ref 0–0.04)
IMM GRANULOCYTES NFR BLD AUTO: 0.2 %
IRON SERPL-MCNC: 54 UG/DL (ref 65–175)
LDLC SERPL CALC-MCNC: 160 MG/DL (ref 50–140)
LYMPHOCYTES # BLD AUTO: 1.7 X10(3)/MCL (ref 0.6–4.6)
LYMPHOCYTES NFR BLD AUTO: 20.6 %
MCH RBC QN AUTO: 31 PG (ref 27–31)
MCHC RBC AUTO-ENTMCNC: 32.1 MG/DL (ref 33–36)
MCV RBC AUTO: 96.5 FL (ref 80–94)
MONOCYTES # BLD AUTO: 0.62 X10(3)/MCL (ref 0.1–1.3)
MONOCYTES NFR BLD AUTO: 7.5 %
NEUTROPHILS # BLD AUTO: 5.7 X10(3)/MCL (ref 2.1–9.2)
NEUTROPHILS NFR BLD AUTO: 68.7 %
PLATELET # BLD AUTO: 193 X10(3)/MCL (ref 130–400)
PMV BLD AUTO: 9.5 FL (ref 7.4–10.4)
POTASSIUM SERPL-SCNC: 4.2 MMOL/L (ref 3.5–5.1)
PROT SERPL-MCNC: 7.4 GM/DL (ref 5.8–7.6)
PSA SERPL-MCNC: 0.32 NG/ML
RBC # BLD AUTO: 4.58 X10(6)/MCL (ref 4.7–6.1)
SODIUM SERPL-SCNC: 138 MMOL/L (ref 136–145)
T3RU NFR SERPL: 37.96 % (ref 31–39)
T4 SERPL-MCNC: 6.25 UG/DL (ref 4.87–11.72)
TRIGL SERPL-MCNC: 118 MG/DL (ref 34–140)
TSH SERPL-ACNC: 0.62 UIU/ML (ref 0.35–4.94)
VLDLC SERPL CALC-MCNC: 24 MG/DL
WBC # SPEC AUTO: 8.3 X10(3)/MCL (ref 4.5–11.5)

## 2022-07-18 PROCEDURE — 83540 ASSAY OF IRON: CPT

## 2022-07-18 PROCEDURE — 83036 HEMOGLOBIN GLYCOSYLATED A1C: CPT

## 2022-07-18 PROCEDURE — 36415 COLL VENOUS BLD VENIPUNCTURE: CPT

## 2022-07-18 PROCEDURE — 84479 ASSAY OF THYROID (T3 OR T4): CPT

## 2022-07-18 PROCEDURE — 80053 COMPREHEN METABOLIC PANEL: CPT

## 2022-07-18 PROCEDURE — 84436 ASSAY OF TOTAL THYROXINE: CPT

## 2022-07-18 PROCEDURE — 82248 BILIRUBIN DIRECT: CPT

## 2022-07-18 PROCEDURE — 84443 ASSAY THYROID STIM HORMONE: CPT

## 2022-07-18 PROCEDURE — 85025 COMPLETE CBC W/AUTO DIFF WBC: CPT

## 2022-07-18 PROCEDURE — 80061 LIPID PANEL: CPT

## 2022-07-18 PROCEDURE — 84153 ASSAY OF PSA TOTAL: CPT

## 2022-08-02 DIAGNOSIS — Z87.891 PERSONAL HISTORY OF TOBACCO USE, PRESENTING HAZARDS TO HEALTH: Primary | ICD-10-CM

## 2022-08-08 ENCOUNTER — HOSPITAL ENCOUNTER (OUTPATIENT)
Dept: RADIOLOGY | Facility: HOSPITAL | Age: 72
Discharge: HOME | End: 2022-08-08
Attending: FAMILY MEDICINE
Payer: MEDICARE

## 2022-08-08 DIAGNOSIS — Z87.891 PERSONAL HISTORY OF TOBACCO USE, PRESENTING HAZARDS TO HEALTH: ICD-10-CM

## 2022-08-08 PROCEDURE — 71271 CT THORAX LUNG CANCER SCR C-: CPT | Mod: TC

## 2023-01-01 ENCOUNTER — LAB VISIT (OUTPATIENT)
Dept: LAB | Facility: HOSPITAL | Age: 73
End: 2023-01-01
Attending: FAMILY MEDICINE
Payer: MEDICARE

## 2023-01-01 ENCOUNTER — DOCUMENTATION ONLY (OUTPATIENT)
Dept: ELECTROPHYSIOLOGY | Facility: CLINIC | Age: 73
End: 2023-01-01
Payer: MEDICARE

## 2023-01-01 ENCOUNTER — DOCUMENTATION ONLY (OUTPATIENT)
Dept: CARDIOTHORACIC SURGERY | Facility: CLINIC | Age: 73
End: 2023-01-01
Payer: MEDICARE

## 2023-01-01 ENCOUNTER — TELEPHONE (OUTPATIENT)
Dept: CARDIAC SURGERY | Facility: CLINIC | Age: 73
End: 2023-01-01
Payer: MEDICARE

## 2023-01-01 ENCOUNTER — ANESTHESIA EVENT (OUTPATIENT)
Dept: INTENSIVE CARE | Facility: HOSPITAL | Age: 73
DRG: 235 | End: 2023-01-01
Payer: MEDICARE

## 2023-01-01 ENCOUNTER — ANESTHESIA (OUTPATIENT)
Dept: INTENSIVE CARE | Facility: HOSPITAL | Age: 73
DRG: 235 | End: 2023-01-01
Payer: MEDICARE

## 2023-01-01 ENCOUNTER — HOSPITAL ENCOUNTER (INPATIENT)
Facility: HOSPITAL | Age: 73
LOS: 4 days | Discharge: ANOTHER HOSPITAL | DRG: 286 | End: 2023-07-28
Attending: INTERNAL MEDICINE | Admitting: INTERNAL MEDICINE
Payer: MEDICARE

## 2023-01-01 ENCOUNTER — HOSPITAL ENCOUNTER (OUTPATIENT)
Dept: CARDIOLOGY | Facility: CLINIC | Age: 73
Discharge: HOME | DRG: 235 | End: 2023-08-29
Payer: MEDICARE

## 2023-01-01 ENCOUNTER — DOCUMENTATION ONLY (OUTPATIENT)
Dept: CARDIOLOGY | Facility: HOSPITAL | Age: 73
End: 2023-01-01
Payer: MEDICARE

## 2023-01-01 ENCOUNTER — HOSPITAL ENCOUNTER (OUTPATIENT)
Dept: RADIOLOGY | Facility: HOSPITAL | Age: 73
Discharge: HOME | DRG: 235 | End: 2023-08-29
Attending: THORACIC SURGERY (CARDIOTHORACIC VASCULAR SURGERY)
Payer: MEDICARE

## 2023-01-01 ENCOUNTER — HOSPITAL ENCOUNTER (OUTPATIENT)
Dept: RADIOLOGY | Facility: HOSPITAL | Age: 73
Discharge: HOME | End: 2023-02-01
Attending: FAMILY MEDICINE
Payer: MEDICARE

## 2023-01-01 ENCOUNTER — TELEPHONE (OUTPATIENT)
Dept: CARDIOTHORACIC SURGERY | Facility: CLINIC | Age: 73
End: 2023-01-01
Payer: MEDICARE

## 2023-01-01 ENCOUNTER — HOSPITAL ENCOUNTER (OUTPATIENT)
Dept: PULMONOLOGY | Facility: CLINIC | Age: 73
Discharge: HOME | DRG: 235 | End: 2023-08-29
Payer: MEDICARE

## 2023-01-01 ENCOUNTER — ANESTHESIA EVENT (OUTPATIENT)
Dept: SURGERY | Facility: HOSPITAL | Age: 73
DRG: 235 | End: 2023-01-01
Payer: MEDICARE

## 2023-01-01 ENCOUNTER — OFFICE VISIT (OUTPATIENT)
Dept: CARDIOTHORACIC SURGERY | Facility: CLINIC | Age: 73
DRG: 235 | End: 2023-01-01
Payer: MEDICARE

## 2023-01-01 ENCOUNTER — ANESTHESIA (OUTPATIENT)
Dept: SURGERY | Facility: HOSPITAL | Age: 73
DRG: 235 | End: 2023-01-01
Payer: MEDICARE

## 2023-01-01 ENCOUNTER — OFFICE VISIT (OUTPATIENT)
Dept: ORTHOPEDICS | Facility: CLINIC | Age: 73
End: 2023-01-01
Payer: MEDICARE

## 2023-01-01 ENCOUNTER — HOSPITAL ENCOUNTER (INPATIENT)
Facility: HOSPITAL | Age: 73
LOS: 2 days | DRG: 235 | End: 2023-09-01
Attending: THORACIC SURGERY (CARDIOTHORACIC VASCULAR SURGERY) | Admitting: THORACIC SURGERY (CARDIOTHORACIC VASCULAR SURGERY)
Payer: MEDICARE

## 2023-01-01 ENCOUNTER — HOSPITAL ENCOUNTER (INPATIENT)
Facility: HOSPITAL | Age: 73
LOS: 4 days | Discharge: HOME | DRG: 287 | End: 2023-08-01
Attending: INTERNAL MEDICINE | Admitting: INTERNAL MEDICINE
Payer: MEDICARE

## 2023-01-01 ENCOUNTER — PATIENT OUTREACH (OUTPATIENT)
Dept: ADMINISTRATIVE | Facility: CLINIC | Age: 73
End: 2023-01-01
Payer: MEDICARE

## 2023-01-01 VITALS
TEMPERATURE: 98 F | HEART RATE: 67 BPM | DIASTOLIC BLOOD PRESSURE: 64 MMHG | HEIGHT: 65 IN | RESPIRATION RATE: 19 BRPM | SYSTOLIC BLOOD PRESSURE: 103 MMHG | WEIGHT: 202 LBS | OXYGEN SATURATION: 97 % | BODY MASS INDEX: 33.66 KG/M2

## 2023-01-01 VITALS
HEIGHT: 66 IN | RESPIRATION RATE: 20 BRPM | HEART RATE: 63 BPM | WEIGHT: 189 LBS | SYSTOLIC BLOOD PRESSURE: 128 MMHG | BODY MASS INDEX: 30.37 KG/M2 | WEIGHT: 177.94 LBS | BODY MASS INDEX: 28.6 KG/M2 | DIASTOLIC BLOOD PRESSURE: 71 MMHG | TEMPERATURE: 96 F | OXYGEN SATURATION: 95 % | OXYGEN SATURATION: 92 % | DIASTOLIC BLOOD PRESSURE: 59 MMHG | HEIGHT: 66 IN | HEART RATE: 62 BPM | SYSTOLIC BLOOD PRESSURE: 104 MMHG

## 2023-01-01 VITALS
SYSTOLIC BLOOD PRESSURE: 82 MMHG | HEIGHT: 66 IN | BODY MASS INDEX: 28.12 KG/M2 | DIASTOLIC BLOOD PRESSURE: 50 MMHG | WEIGHT: 175 LBS | HEART RATE: 46 BPM

## 2023-01-01 VITALS — BODY MASS INDEX: 28.12 KG/M2 | WEIGHT: 175 LBS | HEIGHT: 66 IN

## 2023-01-01 DIAGNOSIS — R94.39 ABNORMAL STRESS TEST: ICD-10-CM

## 2023-01-01 DIAGNOSIS — D64.9 ANEMIA, UNSPECIFIED TYPE: ICD-10-CM

## 2023-01-01 DIAGNOSIS — I21.4 NSTEMI (NON-ST ELEVATED MYOCARDIAL INFARCTION): ICD-10-CM

## 2023-01-01 DIAGNOSIS — Z00.00 ROUTINE GENERAL MEDICAL EXAMINATION AT A HEALTH CARE FACILITY: Primary | ICD-10-CM

## 2023-01-01 DIAGNOSIS — I25.10 CAD (CORONARY ARTERY DISEASE): ICD-10-CM

## 2023-01-01 DIAGNOSIS — I73.9 PERIPHERAL VASCULAR DISEASE, UNSPECIFIED: ICD-10-CM

## 2023-01-01 DIAGNOSIS — R79.9 ABNORMAL FINDING OF BLOOD CHEMISTRY, UNSPECIFIED: ICD-10-CM

## 2023-01-01 DIAGNOSIS — Z01.818 PREOP EXAMINATION: ICD-10-CM

## 2023-01-01 DIAGNOSIS — Z12.5 SPECIAL SCREENING FOR MALIGNANT NEOPLASM OF PROSTATE: ICD-10-CM

## 2023-01-01 DIAGNOSIS — I25.10 CAD, MULTIPLE VESSEL: ICD-10-CM

## 2023-01-01 DIAGNOSIS — E11.69 TYPE 2 DIABETES MELLITUS WITH OTHER SPECIFIED COMPLICATION, WITHOUT LONG-TERM CURRENT USE OF INSULIN: ICD-10-CM

## 2023-01-01 DIAGNOSIS — K21.9 GASTROESOPHAGEAL REFLUX DISEASE, UNSPECIFIED WHETHER ESOPHAGITIS PRESENT: ICD-10-CM

## 2023-01-01 DIAGNOSIS — E11.65 TYPE 2 DIABETES MELLITUS WITH HYPERGLYCEMIA, WITHOUT LONG-TERM CURRENT USE OF INSULIN: Primary | ICD-10-CM

## 2023-01-01 DIAGNOSIS — E11.69 DIABETES MELLITUS ASSOCIATED WITH HORMONAL ETIOLOGY: ICD-10-CM

## 2023-01-01 DIAGNOSIS — S83.8X1A ACUTE MEDIAL MENISCAL INJURY OF RIGHT KNEE, INITIAL ENCOUNTER: Primary | ICD-10-CM

## 2023-01-01 DIAGNOSIS — I25.10 CAD, MULTIPLE VESSEL: Primary | ICD-10-CM

## 2023-01-01 DIAGNOSIS — I10 HYPERTENSION, UNSPECIFIED TYPE: Primary | ICD-10-CM

## 2023-01-01 DIAGNOSIS — S83.249A MEDIAL MENISCUS TEAR: ICD-10-CM

## 2023-01-01 DIAGNOSIS — C67.9 MALIGNANT NEOPLASM OF URINARY BLADDER, UNSPECIFIED SITE: ICD-10-CM

## 2023-01-01 DIAGNOSIS — E11.9 DM (DIABETES MELLITUS): ICD-10-CM

## 2023-01-01 DIAGNOSIS — M17.11 LOCALIZED OSTEOARTHRITIS OF RIGHT KNEE: ICD-10-CM

## 2023-01-01 DIAGNOSIS — S83.249A MEDIAL MENISCUS TEAR: Primary | ICD-10-CM

## 2023-01-01 DIAGNOSIS — S83.91XA SPRAIN OF RIGHT KNEE, UNSPECIFIED LIGAMENT, INITIAL ENCOUNTER: ICD-10-CM

## 2023-01-01 DIAGNOSIS — I25.10 CORONARY ATHEROSCLEROSIS OF NATIVE CORONARY ARTERY: ICD-10-CM

## 2023-01-01 DIAGNOSIS — E78.5 HYPERLIPIDEMIA, UNSPECIFIED HYPERLIPIDEMIA TYPE: ICD-10-CM

## 2023-01-01 DIAGNOSIS — I65.29 CAROTID ARTERY STENOSIS: ICD-10-CM

## 2023-01-01 DIAGNOSIS — Z95.1 S/P CABG (CORONARY ARTERY BYPASS GRAFT): Primary | ICD-10-CM

## 2023-01-01 DIAGNOSIS — I25.5 ISCHEMIC CARDIOMYOPATHY: ICD-10-CM

## 2023-01-01 DIAGNOSIS — Z98.890 HISTORY OF HEART SURGERY: ICD-10-CM

## 2023-01-01 DIAGNOSIS — I95.9 HYPOTENSION: ICD-10-CM

## 2023-01-01 DIAGNOSIS — I49.9 ARRHYTHMIA: ICD-10-CM

## 2023-01-01 DIAGNOSIS — I25.10 ATHEROSCLEROSIS OF NATIVE CORONARY ARTERY OF NATIVE HEART WITHOUT ANGINA PECTORIS: ICD-10-CM

## 2023-01-01 DIAGNOSIS — I25.5 CARDIOMYOPATHY, ISCHEMIC: ICD-10-CM

## 2023-01-01 DIAGNOSIS — I95.9 HYPOTENSION, UNSPECIFIED HYPOTENSION TYPE: Primary | ICD-10-CM

## 2023-01-01 DIAGNOSIS — R57.0 CARDIOGENIC SHOCK: ICD-10-CM

## 2023-01-01 DIAGNOSIS — I50.22 CHRONIC SYSTOLIC HEART FAILURE: ICD-10-CM

## 2023-01-01 DIAGNOSIS — R41.0 CONFUSION: ICD-10-CM

## 2023-01-01 DIAGNOSIS — I10 ESSENTIAL HYPERTENSION, MALIGNANT: ICD-10-CM

## 2023-01-01 DIAGNOSIS — R79.1 ABNORMAL COAGULATION PROFILE: ICD-10-CM

## 2023-01-01 LAB
ABO + RH BLD: NORMAL
ALBUMIN SERPL BCP-MCNC: 2.5 G/DL (ref 3.5–5.2)
ALBUMIN SERPL BCP-MCNC: 3 G/DL (ref 3.5–5.2)
ALBUMIN SERPL BCP-MCNC: 3.3 G/DL (ref 3.5–5.2)
ALBUMIN SERPL BCP-MCNC: 3.3 G/DL (ref 3.5–5.2)
ALBUMIN SERPL BCP-MCNC: 3.4 G/DL (ref 3.5–5.2)
ALBUMIN SERPL BCP-MCNC: 3.5 G/DL (ref 3.5–5.2)
ALBUMIN SERPL-MCNC: 3 G/DL (ref 3.4–4.8)
ALBUMIN SERPL-MCNC: 3.5 G/DL (ref 3.4–4.8)
ALBUMIN SERPL-MCNC: 3.5 G/DL (ref 3.4–4.8)
ALBUMIN SERPL-MCNC: 3.6 G/DL (ref 3.4–4.8)
ALBUMIN/GLOB SERPL: 0.7 RATIO (ref 1.1–2)
ALBUMIN/GLOB SERPL: 1 RATIO (ref 1.1–2)
ALBUMIN/GLOB SERPL: 1.3 RATIO (ref 1.1–2)
ALLENS TEST: ABNORMAL
ALLENS TEST: NORMAL
ALP SERPL-CCNC: 119 UNIT/L (ref 40–150)
ALP SERPL-CCNC: 132 U/L (ref 55–135)
ALP SERPL-CCNC: 139 UNIT/L (ref 40–150)
ALP SERPL-CCNC: 53 U/L (ref 55–135)
ALP SERPL-CCNC: 58 U/L (ref 55–135)
ALP SERPL-CCNC: 64 U/L (ref 55–135)
ALP SERPL-CCNC: 70 UNIT/L (ref 40–150)
ALP SERPL-CCNC: 72 UNIT/L (ref 40–150)
ALP SERPL-CCNC: 77 U/L (ref 55–135)
ALP SERPL-CCNC: 94 U/L (ref 55–135)
ALT SERPL W/O P-5'-P-CCNC: 16 U/L (ref 10–44)
ALT SERPL W/O P-5'-P-CCNC: 20 U/L (ref 10–44)
ALT SERPL W/O P-5'-P-CCNC: 36 U/L (ref 10–44)
ALT SERPL W/O P-5'-P-CCNC: 502 U/L (ref 10–44)
ALT SERPL W/O P-5'-P-CCNC: 6 U/L (ref 10–44)
ALT SERPL W/O P-5'-P-CCNC: 8 U/L (ref 10–44)
ALT SERPL-CCNC: 11 UNIT/L (ref 0–55)
ALT SERPL-CCNC: 12 UNIT/L (ref 0–55)
ALT SERPL-CCNC: 34 UNIT/L (ref 0–55)
ALT SERPL-CCNC: 38 UNIT/L (ref 0–55)
ANION GAP SERPL CALC-SCNC: 10 MEQ/L
ANION GAP SERPL CALC-SCNC: 10 MMOL/L (ref 8–16)
ANION GAP SERPL CALC-SCNC: 11 MMOL/L (ref 8–16)
ANION GAP SERPL CALC-SCNC: 11 MMOL/L (ref 8–16)
ANION GAP SERPL CALC-SCNC: 12 MEQ/L
ANION GAP SERPL CALC-SCNC: 12 MEQ/L
ANION GAP SERPL CALC-SCNC: 12 MMOL/L (ref 8–16)
ANION GAP SERPL CALC-SCNC: 16 MMOL/L (ref 8–16)
ANION GAP SERPL CALC-SCNC: 6 MEQ/L
ANION GAP SERPL CALC-SCNC: 8 MMOL/L (ref 8–16)
ANION GAP SERPL CALC-SCNC: 9 MEQ/L
ANION GAP SERPL CALC-SCNC: 9 MMOL/L (ref 8–16)
APTT PPP: 104.6 SECONDS (ref 23.2–33.7)
APTT PPP: 28.4 SECONDS (ref 23.2–33.7)
APTT PPP: 29.2 SEC (ref 21–32)
APTT PPP: 30.5 SEC (ref 21–32)
APTT PPP: 31.4 SEC (ref 21–32)
APTT PPP: 34.4 SECONDS (ref 23.2–33.7)
APTT PPP: 35.7 SEC (ref 21–32)
APTT PPP: 36.9 SEC (ref 21–32)
APTT PPP: 40.7 SEC (ref 21–32)
APTT PPP: 45.3 SEC (ref 21–32)
APTT PPP: 53.7 SEC (ref 21–32)
APTT PPP: 59.5 SECONDS (ref 23.2–33.7)
APTT PPP: 76.8 SECONDS (ref 23.2–33.7)
APTT PPP: 78 SECONDS (ref 23.2–33.7)
APTT PPP: 92 SECONDS (ref 23.2–33.7)
APTT PPP: 98 SECONDS (ref 23.2–33.7)
APTT PPP: >200 SECONDS (ref 23.2–33.7)
ASCENDING AORTA: 3.06 CM
AST SERPL-CCNC: 13 UNIT/L (ref 5–34)
AST SERPL-CCNC: 14 UNIT/L (ref 5–34)
AST SERPL-CCNC: 18 U/L (ref 10–40)
AST SERPL-CCNC: 21 U/L (ref 10–40)
AST SERPL-CCNC: 32 U/L (ref 10–40)
AST SERPL-CCNC: 32 UNIT/L (ref 5–34)
AST SERPL-CCNC: 33 U/L (ref 10–40)
AST SERPL-CCNC: 35 UNIT/L (ref 5–34)
AST SERPL-CCNC: 41 U/L (ref 10–40)
AST SERPL-CCNC: 820 U/L (ref 10–40)
AV INDEX (PROSTH): 0.37
AV INDEX (PROSTH): 0.41
AV MEAN GRADIENT: 4 MMHG
AV MEAN GRADIENT: 6 MMHG
AV PEAK GRADIENT: 10 MMHG
AV PEAK GRADIENT: 8 MMHG
AV VALVE AREA BY VELOCITY RATIO: 1.57 CM²
AV VALVE AREA: 1.35 CM²
AV VELOCITY RATIO: 0.43
AV VELOCITY RATIO: 0.44
BASOPHILS # BLD AUTO: 0.03 K/UL (ref 0–0.2)
BASOPHILS # BLD AUTO: 0.03 K/UL (ref 0–0.2)
BASOPHILS # BLD AUTO: 0.03 X10(3)/MCL
BASOPHILS # BLD AUTO: 0.03 X10(3)/MCL (ref 0–0.2)
BASOPHILS # BLD AUTO: 0.04 K/UL (ref 0–0.2)
BASOPHILS # BLD AUTO: 0.05 K/UL (ref 0–0.2)
BASOPHILS # BLD AUTO: 0.05 X10(3)/MCL
BASOPHILS # BLD AUTO: 0.06 K/UL (ref 0–0.2)
BASOPHILS NFR BLD AUTO: 0.3 %
BASOPHILS NFR BLD AUTO: 0.3 %
BASOPHILS NFR BLD AUTO: 0.6 %
BASOPHILS NFR BLD AUTO: 0.7 %
BASOPHILS NFR BLD: 0.2 % (ref 0–1.9)
BASOPHILS NFR BLD: 0.3 % (ref 0–1.9)
BASOPHILS NFR BLD: 0.4 % (ref 0–1.9)
BASOPHILS NFR BLD: 0.5 % (ref 0–1.9)
BASOPHILS NFR BLD: 0.6 % (ref 0–1.9)
BILIRUB SERPL-MCNC: 0.7 MG/DL (ref 0.1–1)
BILIRUB SERPL-MCNC: 0.8 MG/DL (ref 0.1–1)
BILIRUB SERPL-MCNC: 1.1 MG/DL (ref 0.1–1)
BILIRUB SERPL-MCNC: 1.2 MG/DL (ref 0.1–1)
BILIRUB SERPL-MCNC: 1.3 MG/DL (ref 0.1–1)
BILIRUB SERPL-MCNC: 1.5 MG/DL (ref 0.1–1)
BILIRUBIN DIRECT+TOT PNL SERPL-MCNC: 0.2 MG/DL (ref 0–0.8)
BILIRUBIN DIRECT+TOT PNL SERPL-MCNC: 0.2 MG/DL (ref 0–?)
BILIRUBIN DIRECT+TOT PNL SERPL-MCNC: 0.4 MG/DL
BILIRUBIN DIRECT+TOT PNL SERPL-MCNC: 0.6 MG/DL
BILIRUBIN DIRECT+TOT PNL SERPL-MCNC: 0.6 MG/DL
BILIRUBIN DIRECT+TOT PNL SERPL-MCNC: 0.8 MG/DL
BLD GP AB SCN CELLS X3 SERPL QL: NORMAL
BLD PROD TYP BPU: NORMAL
BLOOD UNIT EXPIRATION DATE: NORMAL
BLOOD UNIT TYPE CODE: 6200
BNP BLD-MCNC: 2850.5 PG/ML
BSA FOR ECHO PROCEDURE: 2.07 M2
BSA FOR ECHO PROCEDURE: 2.07 M2
BSA FOR ECHO PROCEDURE: 2.09 M2
BUN SERPL-MCNC: 10 MG/DL (ref 8.4–25.7)
BUN SERPL-MCNC: 11 MG/DL (ref 8–23)
BUN SERPL-MCNC: 11 MG/DL (ref 8–23)
BUN SERPL-MCNC: 11.1 MG/DL (ref 8.4–25.7)
BUN SERPL-MCNC: 11.6 MG/DL (ref 8.4–25.7)
BUN SERPL-MCNC: 11.6 MG/DL (ref 8.4–25.7)
BUN SERPL-MCNC: 12 MG/DL (ref 8–23)
BUN SERPL-MCNC: 13 MG/DL (ref 8–23)
BUN SERPL-MCNC: 13.6 MG/DL (ref 8.4–25.7)
BUN SERPL-MCNC: 13.7 MG/DL (ref 8.4–25.7)
BUN SERPL-MCNC: 13.7 MG/DL (ref 8.4–25.7)
BUN SERPL-MCNC: 15 MG/DL (ref 8–23)
BUN SERPL-MCNC: 17 MG/DL (ref 8–23)
BUN SERPL-MCNC: 17 MG/DL (ref 8–23)
BUN SERPL-MCNC: 18.5 MG/DL (ref 8.4–25.7)
BUN SERPL-MCNC: 24 MG/DL (ref 8–23)
CALCIUM SERPL-MCNC: 10.9 MG/DL (ref 8.7–10.5)
CALCIUM SERPL-MCNC: 7.6 MG/DL (ref 8.7–10.5)
CALCIUM SERPL-MCNC: 8.1 MG/DL (ref 8.7–10.5)
CALCIUM SERPL-MCNC: 8.1 MG/DL (ref 8.7–10.5)
CALCIUM SERPL-MCNC: 8.2 MG/DL (ref 8.7–10.5)
CALCIUM SERPL-MCNC: 8.4 MG/DL (ref 8.8–10)
CALCIUM SERPL-MCNC: 8.5 MG/DL (ref 8.8–10)
CALCIUM SERPL-MCNC: 8.6 MG/DL (ref 8.7–10.5)
CALCIUM SERPL-MCNC: 8.6 MG/DL (ref 8.8–10)
CALCIUM SERPL-MCNC: 8.7 MG/DL (ref 8.8–10)
CALCIUM SERPL-MCNC: 8.9 MG/DL (ref 8.7–10.5)
CALCIUM SERPL-MCNC: 8.9 MG/DL (ref 8.8–10)
CALCIUM SERPL-MCNC: 9 MG/DL (ref 8.7–10.5)
CALCIUM SERPL-MCNC: 9 MG/DL (ref 8.8–10)
CALCIUM SERPL-MCNC: 9.1 MG/DL (ref 8.7–10.5)
CALCIUM SERPL-MCNC: 9.2 MG/DL (ref 8.7–10.5)
CALCIUM SERPL-MCNC: 9.3 MG/DL (ref 8.8–10)
CALCIUM SERPL-MCNC: 9.6 MG/DL (ref 8.8–10)
CATH EF QUANTITATIVE: 20 %
CHLORIDE SERPL-SCNC: 100 MMOL/L (ref 98–107)
CHLORIDE SERPL-SCNC: 101 MMOL/L (ref 95–110)
CHLORIDE SERPL-SCNC: 101 MMOL/L (ref 98–107)
CHLORIDE SERPL-SCNC: 102 MMOL/L (ref 95–110)
CHLORIDE SERPL-SCNC: 102 MMOL/L (ref 95–110)
CHLORIDE SERPL-SCNC: 103 MMOL/L (ref 95–110)
CHLORIDE SERPL-SCNC: 103 MMOL/L (ref 98–107)
CHLORIDE SERPL-SCNC: 104 MMOL/L (ref 95–110)
CHLORIDE SERPL-SCNC: 104 MMOL/L (ref 98–107)
CHLORIDE SERPL-SCNC: 105 MMOL/L (ref 98–107)
CHLORIDE SERPL-SCNC: 106 MMOL/L (ref 95–110)
CHLORIDE SERPL-SCNC: 107 MMOL/L (ref 95–110)
CHLORIDE SERPL-SCNC: 108 MMOL/L (ref 95–110)
CHLORIDE SERPL-SCNC: 111 MMOL/L (ref 95–110)
CHLORIDE SERPL-SCNC: 112 MMOL/L (ref 95–110)
CHLORIDE SERPL-SCNC: 99 MMOL/L (ref 98–107)
CHOLEST SERPL-MCNC: 115 MG/DL
CHOLEST/HDLC SERPL: 4 {RATIO} (ref 0–5)
CLOSURE TME COLL+ADP BLD: NORMAL S
CLOSURE TME COLL+EPINEP BLD: 106 SECONDS (ref 68–183)
CO2 SERPL-SCNC: 15 MMOL/L (ref 23–29)
CO2 SERPL-SCNC: 20 MMOL/L (ref 23–29)
CO2 SERPL-SCNC: 20 MMOL/L (ref 23–29)
CO2 SERPL-SCNC: 21 MMOL/L (ref 23–29)
CO2 SERPL-SCNC: 22 MMOL/L (ref 23–29)
CO2 SERPL-SCNC: 23 MMOL/L (ref 23–29)
CO2 SERPL-SCNC: 24 MMOL/L (ref 23–29)
CO2 SERPL-SCNC: 24 MMOL/L (ref 23–29)
CO2 SERPL-SCNC: 25 MMOL/L (ref 23–31)
CO2 SERPL-SCNC: 26 MMOL/L (ref 23–31)
CO2 SERPL-SCNC: 27 MMOL/L (ref 23–31)
CO2 SERPL-SCNC: 27 MMOL/L (ref 23–31)
CO2 SERPL-SCNC: 28 MMOL/L (ref 23–31)
CO2 SERPL-SCNC: 28 MMOL/L (ref 23–31)
CO2 SERPL-SCNC: 30 MMOL/L (ref 23–31)
CO2 SERPL-SCNC: 32 MMOL/L (ref 23–31)
CREAT SERPL-MCNC: 0.7 MG/DL (ref 0.5–1.4)
CREAT SERPL-MCNC: 0.8 MG/DL (ref 0.5–1.4)
CREAT SERPL-MCNC: 0.81 MG/DL (ref 0.73–1.18)
CREAT SERPL-MCNC: 0.82 MG/DL (ref 0.73–1.18)
CREAT SERPL-MCNC: 0.86 MG/DL (ref 0.73–1.18)
CREAT SERPL-MCNC: 0.87 MG/DL (ref 0.73–1.18)
CREAT SERPL-MCNC: 0.88 MG/DL (ref 0.73–1.18)
CREAT SERPL-MCNC: 0.88 MG/DL (ref 0.73–1.18)
CREAT SERPL-MCNC: 0.9 MG/DL (ref 0.5–1.4)
CREAT SERPL-MCNC: 0.9 MG/DL (ref 0.73–1.18)
CREAT SERPL-MCNC: 1 MG/DL (ref 0.5–1.4)
CREAT SERPL-MCNC: 1.23 MG/DL (ref 0.73–1.18)
CREAT SERPL-MCNC: 1.4 MG/DL (ref 0.5–1.4)
CREAT/UREA NIT SERPL: 12
CREAT/UREA NIT SERPL: 13
CREAT/UREA NIT SERPL: 13
CREAT/UREA NIT SERPL: 15
CREAT/UREA NIT SERPL: 15
CROSSMATCH INTERPRETATION: NORMAL
CV ECHO LV RWT: 0.27 CM
CV ECHO LV RWT: 0.4 CM
DELSYS: ABNORMAL
DELSYS: NORMAL
DIFFERENTIAL METHOD: ABNORMAL
DISPENSE STATUS: NORMAL
DLCO ADJ PRE: 6.7 ML/(MIN*MMHG) (ref 15.85–29.7)
DLCO SINGLE BREATH LLN: 15.85
DLCO SINGLE BREATH PRE REF: 26.8 %
DLCO SINGLE BREATH REF: 22.77
DLCOC SBVA LLN: 2.39
DLCOC SBVA PRE REF: 62.8 %
DLCOC SBVA REF: 3.73
DLCOC SINGLE BREATH LLN: 15.85
DLCOC SINGLE BREATH PRE REF: 29.4 %
DLCOC SINGLE BREATH REF: 22.77
DLCOCSBVAULN: 5.06
DLCOCSINGLEBREATHULN: 29.7
DLCOSINGLEBREATHULN: 29.7
DLCOVA LLN: 2.39
DLCOVA PRE REF: 57.2 %
DLCOVA PRE: 2.13 ML/(MIN*MMHG*L) (ref 2.39–5.06)
DLCOVA REF: 3.73
DLCOVAULN: 5.06
DLVAADJ PRE: 2.34 ML/(MIN*MMHG*L) (ref 2.39–5.06)
DOP CALC AO PEAK VEL: 1.37 M/S
DOP CALC AO PEAK VEL: 1.59 M/S
DOP CALC AO VTI: 24.7 CM
DOP CALC AO VTI: 26.01 CM
DOP CALC LVOT AREA: 3.7 CM2
DOP CALC LVOT DIAMETER: 2.16 CM
DOP CALC LVOT PEAK VEL: 0.6 M/S
DOP CALC LVOT PEAK VEL: 0.68 M/S
DOP CALC LVOT STROKE VOLUME: 35.05 CM3
DOP CALCLVOT PEAK VEL VTI: 10.2 CM
DOP CALCLVOT PEAK VEL VTI: 9.57 CM
E WAVE DECELERATION TIME: 143 MSEC
E WAVE DECELERATION TIME: 157.51 MSEC
E/A RATIO: 0.85
E/A RATIO: 1.09
E/E' RATIO: 10.25 M/S
E/E' RATIO: 15.2 M/S
ECHO LV POSTERIOR WALL: 0.82 CM (ref 0.6–1.1)
ECHO LV POSTERIOR WALL: 1.15 CM (ref 0.6–1.1)
EJECTION FRACTION- HIGH: 59 %
EJECTION FRACTION: 20 %
EJECTION FRACTION: 25 %
END DIASTOLIC INDEX-HIGH: 155 ML/M2
END DIASTOLIC INDEX-LOW: 91 ML/M2
END SYSTOLIC INDEX-HIGH: 78 ML/M2
END SYSTOLIC INDEX-LOW: 40 ML/M2
EOSINOPHIL # BLD AUTO: 0 K/UL (ref 0–0.5)
EOSINOPHIL # BLD AUTO: 0.1 K/UL (ref 0–0.5)
EOSINOPHIL # BLD AUTO: 0.17 X10(3)/MCL (ref 0–0.9)
EOSINOPHIL # BLD AUTO: 0.18 X10(3)/MCL (ref 0–0.9)
EOSINOPHIL # BLD AUTO: 0.2 K/UL (ref 0–0.5)
EOSINOPHIL # BLD AUTO: 0.2 X10(3)/MCL (ref 0–0.9)
EOSINOPHIL # BLD AUTO: 0.24 X10(3)/MCL (ref 0–0.9)
EOSINOPHIL # BLD AUTO: 0.28 X10(3)/MCL (ref 0–0.9)
EOSINOPHIL # BLD AUTO: 0.3 K/UL (ref 0–0.5)
EOSINOPHIL # BLD AUTO: 0.31 X10(3)/MCL (ref 0–0.9)
EOSINOPHIL NFR BLD AUTO: 2.1 %
EOSINOPHIL NFR BLD AUTO: 2.6 %
EOSINOPHIL NFR BLD AUTO: 2.7 %
EOSINOPHIL NFR BLD AUTO: 2.7 %
EOSINOPHIL NFR BLD AUTO: 2.8 %
EOSINOPHIL NFR BLD AUTO: 2.8 %
EOSINOPHIL NFR BLD: 0 % (ref 0–8)
EOSINOPHIL NFR BLD: 0.1 % (ref 0–8)
EOSINOPHIL NFR BLD: 0.6 % (ref 0–8)
EOSINOPHIL NFR BLD: 2.7 % (ref 0–8)
EOSINOPHIL NFR BLD: 3 % (ref 0–8)
ERYTHROCYTE [DISTWIDTH] IN BLOOD BY AUTOMATED COUNT: 14.7 % (ref 11.5–17)
ERYTHROCYTE [DISTWIDTH] IN BLOOD BY AUTOMATED COUNT: 16.1 % (ref 11.5–17)
ERYTHROCYTE [DISTWIDTH] IN BLOOD BY AUTOMATED COUNT: 16.4 % (ref 11.5–17)
ERYTHROCYTE [DISTWIDTH] IN BLOOD BY AUTOMATED COUNT: 16.7 % (ref 11.5–14.5)
ERYTHROCYTE [DISTWIDTH] IN BLOOD BY AUTOMATED COUNT: 16.7 % (ref 11.5–14.5)
ERYTHROCYTE [DISTWIDTH] IN BLOOD BY AUTOMATED COUNT: 16.9 % (ref 11.5–17)
ERYTHROCYTE [DISTWIDTH] IN BLOOD BY AUTOMATED COUNT: 17 % (ref 11.5–17)
ERYTHROCYTE [DISTWIDTH] IN BLOOD BY AUTOMATED COUNT: 17.1 % (ref 11.5–14.5)
ERYTHROCYTE [DISTWIDTH] IN BLOOD BY AUTOMATED COUNT: 17.2 % (ref 11.5–14.5)
ERYTHROCYTE [DISTWIDTH] IN BLOOD BY AUTOMATED COUNT: 17.6 % (ref 11.5–14.5)
ERYTHROCYTE [DISTWIDTH] IN BLOOD BY AUTOMATED COUNT: 17.7 % (ref 11.5–14.5)
ERYTHROCYTE [DISTWIDTH] IN BLOOD BY AUTOMATED COUNT: 18.2 % (ref 11.5–14.5)
ERYTHROCYTE [SEDIMENTATION RATE] IN BLOOD BY WESTERGREN METHOD: 18 MM/H
ERYTHROCYTE [SEDIMENTATION RATE] IN BLOOD BY WESTERGREN METHOD: 20 MM/H
ERYTHROCYTE [SEDIMENTATION RATE] IN BLOOD BY WESTERGREN METHOD: 24 MM/H
EST. AVERAGE GLUCOSE BLD GHB EST-MCNC: 114 MG/DL
EST. AVERAGE GLUCOSE BLD GHB EST-MCNC: 122.6 MG/DL
EST. GFR  (NO RACE VARIABLE): 53.7 ML/MIN/1.73 M^2
EST. GFR  (NO RACE VARIABLE): >60 ML/MIN/1.73 M^2
FEF 25 75 LLN: 0.89
FEF 25 75 PRE REF: 63.2 %
FEF 25 75 REF: 2.08
FEV05 LLN: 1.02
FEV05 REF: 2.16
FEV1 FVC LLN: 63
FEV1 FVC PRE REF: 100.4 %
FEV1 FVC REF: 77
FEV1 LLN: 1.92
FEV1 PRE REF: 60.9 %
FEV1 REF: 2.67
FIBRINOGEN PPP-MCNC: 166 MG/DL (ref 182–400)
FIO2: 100
FIO2: 40
FIO2: 40
FIO2: 50
FIO2: 60
FIO2: 70
FIO2: 90
FIO2: 90
FLOW: 25
FLOW: 25
FLOW: 60
FRACTIONAL SHORTENING: 13 % (ref 28–44)
FRACTIONAL SHORTENING: 14 % (ref 28–44)
FT4I SERPL CALC-MCNC: 2.39 (ref 2.6–3.6)
FT4I SERPL CALC-MCNC: 2.72 (ref 2.6–3.6)
FVC LLN: 2.58
FVC PRE REF: 60.5 %
FVC REF: 3.5
GFR SERPLBLD CREATININE-BSD FMLA CKD-EPI: >60 MLS/MIN/1.73/M2
GLOBULIN SER-MCNC: 2.8 GM/DL (ref 2.4–3.5)
GLOBULIN SER-MCNC: 3.6 GM/DL (ref 2.4–3.5)
GLOBULIN SER-MCNC: 4.3 GM/DL (ref 2.4–3.5)
GLUCOSE SERPL-MCNC: 100 MG/DL (ref 82–115)
GLUCOSE SERPL-MCNC: 102 MG/DL (ref 70–110)
GLUCOSE SERPL-MCNC: 102 MG/DL (ref 82–115)
GLUCOSE SERPL-MCNC: 104 MG/DL (ref 70–110)
GLUCOSE SERPL-MCNC: 110 MG/DL (ref 70–110)
GLUCOSE SERPL-MCNC: 111 MG/DL (ref 82–115)
GLUCOSE SERPL-MCNC: 114 MG/DL (ref 82–115)
GLUCOSE SERPL-MCNC: 123 MG/DL (ref 82–115)
GLUCOSE SERPL-MCNC: 138 MG/DL (ref 70–110)
GLUCOSE SERPL-MCNC: 140 MG/DL (ref 70–110)
GLUCOSE SERPL-MCNC: 152 MG/DL (ref 70–110)
GLUCOSE SERPL-MCNC: 153 MG/DL (ref 70–110)
GLUCOSE SERPL-MCNC: 156 MG/DL (ref 70–110)
GLUCOSE SERPL-MCNC: 160 MG/DL (ref 70–110)
GLUCOSE SERPL-MCNC: 162 MG/DL (ref 70–110)
GLUCOSE SERPL-MCNC: 166 MG/DL (ref 70–110)
GLUCOSE SERPL-MCNC: 169 MG/DL (ref 70–110)
GLUCOSE SERPL-MCNC: 172 MG/DL (ref 70–110)
GLUCOSE SERPL-MCNC: 172 MG/DL (ref 70–110)
GLUCOSE SERPL-MCNC: 212 MG/DL (ref 70–110)
GLUCOSE SERPL-MCNC: 311 MG/DL (ref 70–110)
GLUCOSE SERPL-MCNC: 89 MG/DL (ref 70–110)
GLUCOSE SERPL-MCNC: 90 MG/DL (ref 82–115)
GLUCOSE SERPL-MCNC: 92 MG/DL (ref 70–110)
GLUCOSE SERPL-MCNC: 94 MG/DL (ref 82–115)
GLUCOSE SERPL-MCNC: 95 MG/DL (ref 70–110)
GLUCOSE SERPL-MCNC: 97 MG/DL (ref 82–115)
GROUP & RH: NORMAL
HBA1C MFR BLD: 5.6 %
HBA1C MFR BLD: 5.9 %
HCO3 UR-SCNC: 13.4 MMOL/L (ref 24–28)
HCO3 UR-SCNC: 16.2 MMOL/L (ref 24–28)
HCO3 UR-SCNC: 19.3 MMOL/L (ref 24–28)
HCO3 UR-SCNC: 19.5 MMOL/L (ref 24–28)
HCO3 UR-SCNC: 19.9 MMOL/L (ref 24–28)
HCO3 UR-SCNC: 20.3 MMOL/L (ref 24–28)
HCO3 UR-SCNC: 20.7 MMOL/L (ref 24–28)
HCO3 UR-SCNC: 20.8 MMOL/L (ref 24–28)
HCO3 UR-SCNC: 21.2 MMOL/L (ref 24–28)
HCO3 UR-SCNC: 21.3 MMOL/L (ref 24–28)
HCO3 UR-SCNC: 21.4 MMOL/L (ref 24–28)
HCO3 UR-SCNC: 21.4 MMOL/L (ref 24–28)
HCO3 UR-SCNC: 21.6 MMOL/L (ref 24–28)
HCO3 UR-SCNC: 21.7 MMOL/L (ref 24–28)
HCO3 UR-SCNC: 21.8 MMOL/L (ref 24–28)
HCO3 UR-SCNC: 21.8 MMOL/L (ref 24–28)
HCO3 UR-SCNC: 21.9 MMOL/L (ref 24–28)
HCO3 UR-SCNC: 22.2 MMOL/L (ref 24–28)
HCO3 UR-SCNC: 22.2 MMOL/L (ref 24–28)
HCO3 UR-SCNC: 22.4 MMOL/L (ref 24–28)
HCO3 UR-SCNC: 22.9 MMOL/L (ref 24–28)
HCO3 UR-SCNC: 23.2 MMOL/L (ref 24–28)
HCO3 UR-SCNC: 23.9 MMOL/L (ref 24–28)
HCO3 UR-SCNC: 24.2 MMOL/L (ref 24–28)
HCO3 UR-SCNC: 24.3 MMOL/L (ref 24–28)
HCO3 UR-SCNC: 24.5 MMOL/L (ref 24–28)
HCO3 UR-SCNC: 25.1 MMOL/L (ref 24–28)
HCO3 UR-SCNC: 25.5 MMOL/L (ref 24–28)
HCO3 UR-SCNC: 26.2 MMOL/L (ref 24–28)
HCO3 UR-SCNC: 26.3 MMOL/L (ref 24–28)
HCO3 UR-SCNC: 26.4 MMOL/L (ref 24–28)
HCO3 UR-SCNC: 26.5 MMOL/L (ref 24–28)
HCO3 UR-SCNC: 27.8 MMOL/L (ref 24–28)
HCO3 UR-SCNC: 28.4 MMOL/L (ref 24–28)
HCO3 UR-SCNC: 28.7 MMOL/L (ref 24–28)
HCO3 UR-SCNC: 30.1 MMOL/L (ref 24–28)
HCT VFR BLD AUTO: 21.7 % (ref 40–54)
HCT VFR BLD AUTO: 24.5 % (ref 40–54)
HCT VFR BLD AUTO: 26.4 % (ref 40–54)
HCT VFR BLD AUTO: 28.5 % (ref 40–54)
HCT VFR BLD AUTO: 29.1 % (ref 40–54)
HCT VFR BLD AUTO: 33.8 % (ref 42–52)
HCT VFR BLD AUTO: 34.1 % (ref 42–52)
HCT VFR BLD AUTO: 35.9 % (ref 42–52)
HCT VFR BLD AUTO: 37.3 % (ref 40–54)
HCT VFR BLD AUTO: 39 % (ref 42–52)
HCT VFR BLD AUTO: 39.4 % (ref 40–54)
HCT VFR BLD AUTO: 39.8 % (ref 42–52)
HCT VFR BLD AUTO: 40.7 % (ref 42–52)
HCT VFR BLD AUTO: 41.4 % (ref 42–52)
HCT VFR BLD CALC: 18 %PCV (ref 36–54)
HCT VFR BLD CALC: 19 %PCV (ref 36–54)
HCT VFR BLD CALC: 20 %PCV (ref 36–54)
HCT VFR BLD CALC: 22 %PCV (ref 36–54)
HCT VFR BLD CALC: 23 %PCV (ref 36–54)
HCT VFR BLD CALC: 24 %PCV (ref 36–54)
HCT VFR BLD CALC: 25 %PCV (ref 36–54)
HCT VFR BLD CALC: 26 %PCV (ref 36–54)
HCT VFR BLD CALC: 26 %PCV (ref 36–54)
HCT VFR BLD CALC: 27 %PCV (ref 36–54)
HCT VFR BLD CALC: 28 %PCV (ref 36–54)
HCT VFR BLD CALC: 28 %PCV (ref 36–54)
HCT VFR BLD CALC: 35 %PCV (ref 36–54)
HCT VFR BLD CALC: 35 %PCV (ref 36–54)
HDLC SERPL-MCNC: 32 MG/DL (ref 35–60)
HGB BLD-MCNC: 11 G/DL (ref 14–18)
HGB BLD-MCNC: 11.2 G/DL (ref 14–18)
HGB BLD-MCNC: 11.5 G/DL (ref 14–18)
HGB BLD-MCNC: 12.1 G/DL (ref 14–18)
HGB BLD-MCNC: 12.2 G/DL (ref 14–18)
HGB BLD-MCNC: 12.3 G/DL (ref 14–18)
HGB BLD-MCNC: 12.4 G/DL (ref 14–18)
HGB BLD-MCNC: 12.6 GM/DL (ref 14–18)
HGB BLD-MCNC: 12.9 G/DL (ref 14–18)
HGB BLD-MCNC: 6.5 G/DL (ref 14–18)
HGB BLD-MCNC: 7.9 G/DL (ref 14–18)
HGB BLD-MCNC: 8.2 G/DL (ref 14–18)
HGB BLD-MCNC: 9.1 G/DL (ref 14–18)
HGB BLD-MCNC: 9.2 G/DL (ref 14–18)
IMM GRANULOCYTES # BLD AUTO: 0.01 X10(3)/MCL (ref 0–0.04)
IMM GRANULOCYTES # BLD AUTO: 0.01 X10(3)/MCL (ref 0–0.04)
IMM GRANULOCYTES # BLD AUTO: 0.02 K/UL (ref 0–0.04)
IMM GRANULOCYTES # BLD AUTO: 0.02 X10(3)/MCL (ref 0–0.04)
IMM GRANULOCYTES # BLD AUTO: 0.03 K/UL (ref 0–0.04)
IMM GRANULOCYTES # BLD AUTO: 0.03 X10(3)/MCL (ref 0–0.04)
IMM GRANULOCYTES # BLD AUTO: 0.04 K/UL (ref 0–0.04)
IMM GRANULOCYTES # BLD AUTO: 0.04 X10(3)/MCL (ref 0–0.04)
IMM GRANULOCYTES # BLD AUTO: 0.06 K/UL (ref 0–0.04)
IMM GRANULOCYTES # BLD AUTO: 0.06 X10(3)/MCL (ref 0–0.04)
IMM GRANULOCYTES # BLD AUTO: 0.09 K/UL (ref 0–0.04)
IMM GRANULOCYTES # BLD AUTO: 0.13 K/UL (ref 0–0.04)
IMM GRANULOCYTES # BLD AUTO: 0.42 K/UL (ref 0–0.04)
IMM GRANULOCYTES NFR BLD AUTO: 0.1 %
IMM GRANULOCYTES NFR BLD AUTO: 0.1 %
IMM GRANULOCYTES NFR BLD AUTO: 0.2 %
IMM GRANULOCYTES NFR BLD AUTO: 0.2 % (ref 0–0.5)
IMM GRANULOCYTES NFR BLD AUTO: 0.4 %
IMM GRANULOCYTES NFR BLD AUTO: 0.4 %
IMM GRANULOCYTES NFR BLD AUTO: 0.4 % (ref 0–0.5)
IMM GRANULOCYTES NFR BLD AUTO: 0.5 %
IMM GRANULOCYTES NFR BLD AUTO: 0.6 % (ref 0–0.5)
IMM GRANULOCYTES NFR BLD AUTO: 0.8 % (ref 0–0.5)
IMM GRANULOCYTES NFR BLD AUTO: 2.2 % (ref 0–0.5)
INDIRECT COOMBS GEL: NORMAL
INR BLD: 1.3 (ref 0–1.3)
INR PPP: 1.1 (ref 0.8–1.2)
INR PPP: 1.2 (ref 0.8–1.2)
INR PPP: 1.5 (ref 0.8–1.2)
INR PPP: 1.6 (ref 0.8–1.2)
INR PPP: 1.6 (ref 0.8–1.2)
INR PPP: 1.7 (ref 0.8–1.2)
INR PPP: 1.8 (ref 0.8–1.2)
INTERVENTRICULAR SEPTUM: 0.87 CM (ref 0.6–1.1)
INTERVENTRICULAR SEPTUM: 1.11 CM (ref 0.6–1.1)
IP: 15
IRON SATN MFR SERPL: 16 % (ref 20–50)
IRON SERPL-MCNC: 39 UG/DL (ref 65–175)
IT: 0.8
IT: 0.8
IT: 0.9
IVC PRE: 1.99 L (ref 2.58–4.43)
IVC SINGLE BREATH LLN: 2.58
IVC SINGLE BREATH PRE REF: 57 %
IVC SINGLE BREATH REF: 3.5
IVCSINGLEBREATHULN: 4.43
LA MAJOR: 5.94 CM
LA MINOR: 5.98 CM
LA WIDTH: 3.73 CM
LDH SERPL L TO P-CCNC: 1.02 MMOL/L (ref 0.36–1.25)
LDH SERPL L TO P-CCNC: 1.13 MMOL/L (ref 0.36–1.25)
LDH SERPL L TO P-CCNC: 1.14 MMOL/L (ref 0.36–1.25)
LDH SERPL L TO P-CCNC: 1.17 MMOL/L (ref 0.36–1.25)
LDH SERPL L TO P-CCNC: 1.19 MMOL/L (ref 0.36–1.25)
LDH SERPL L TO P-CCNC: 1.28 MMOL/L (ref 0.36–1.25)
LDH SERPL L TO P-CCNC: 1.42 MMOL/L (ref 0.36–1.25)
LDH SERPL L TO P-CCNC: 1.42 MMOL/L (ref 0.36–1.25)
LDH SERPL L TO P-CCNC: 1.48 MMOL/L (ref 0.36–1.25)
LDH SERPL L TO P-CCNC: 1.5 MMOL/L (ref 0.36–1.25)
LDH SERPL L TO P-CCNC: 1.6 MMOL/L (ref 0.36–1.25)
LDH SERPL L TO P-CCNC: 1.62 MMOL/L (ref 0.36–1.25)
LDH SERPL L TO P-CCNC: 1.83 MMOL/L (ref 0.5–2.2)
LDH SERPL L TO P-CCNC: 1.9 MMOL/L (ref 0.36–1.25)
LDH SERPL L TO P-CCNC: 1.91 MMOL/L (ref 0.36–1.25)
LDH SERPL L TO P-CCNC: 2.18 MMOL/L (ref 0.36–1.25)
LDH SERPL L TO P-CCNC: 2.48 MMOL/L (ref 0.36–1.25)
LDH SERPL L TO P-CCNC: 2.61 MMOL/L (ref 0.36–1.25)
LDH SERPL L TO P-CCNC: 2.73 MMOL/L (ref 0.36–1.25)
LDH SERPL L TO P-CCNC: 2.86 MMOL/L (ref 0.36–1.25)
LDH SERPL L TO P-CCNC: 3.03 MMOL/L (ref 0.36–1.25)
LDH SERPL L TO P-CCNC: 3.27 MMOL/L (ref 0.36–1.25)
LDH SERPL L TO P-CCNC: 3.62 MMOL/L (ref 0.36–1.25)
LDH SERPL L TO P-CCNC: 4.06 MMOL/L (ref 0.36–1.25)
LDH SERPL L TO P-CCNC: 7.84 MMOL/L (ref 0.36–1.25)
LDH SERPL L TO P-CCNC: 9.05 MMOL/L (ref 0.36–1.25)
LDH SERPL L TO P-CCNC: 9.63 MMOL/L (ref 0.36–1.25)
LDLC SERPL CALC-MCNC: 71 MG/DL (ref 50–140)
LEFT ATRIUM SIZE: 4.06 CM
LEFT ATRIUM SIZE: 4.1 CM
LEFT ATRIUM VOLUME INDEX MOD: 36.3 ML/M2
LEFT ATRIUM VOLUME INDEX MOD: 39.6 ML/M2
LEFT ATRIUM VOLUME INDEX: 37.8 ML/M2
LEFT ATRIUM VOLUME MOD: 73.6 CM3
LEFT ATRIUM VOLUME MOD: 77.6 CM3
LEFT ATRIUM VOLUME: 76.72 CM3
LEFT CCA DIST DIAS: 11 CM/S
LEFT CCA DIST SYS: 50 CM/S
LEFT CCA PROX DIAS: 8 CM/S
LEFT CCA PROX SYS: 77 CM/S
LEFT ECA DIAS: 9 CM/S
LEFT ECA SYS: 61 CM/S
LEFT ICA DIST DIAS: 18 CM/S
LEFT ICA DIST SYS: 64 CM/S
LEFT ICA MID DIAS: 15 CM/S
LEFT ICA MID SYS: 69 CM/S
LEFT ICA PROX DIAS: 13 CM/S
LEFT ICA PROX SYS: 74 CM/S
LEFT INTERNAL DIMENSION IN SYSTOLE: 4.98 CM (ref 2.1–4)
LEFT INTERNAL DIMENSION IN SYSTOLE: 5.2 CM (ref 2.1–4)
LEFT VENTRICLE DIASTOLIC VOLUME INDEX: 82.11 ML/M2
LEFT VENTRICLE DIASTOLIC VOLUME INDEX: 91.33 ML/M2
LEFT VENTRICLE DIASTOLIC VOLUME INDEX: 95.48 ML/M2
LEFT VENTRICLE DIASTOLIC VOLUME: 166.68 ML
LEFT VENTRICLE DIASTOLIC VOLUME: 179 ML
LEFT VENTRICLE DIASTOLIC VOLUME: 190 ML
LEFT VENTRICLE MASS INDEX: 101 G/M2
LEFT VENTRICLE MASS INDEX: 135 G/M2
LEFT VENTRICLE SYSTOLIC VOLUME INDEX: 57.8 ML/M2
LEFT VENTRICLE SYSTOLIC VOLUME INDEX: 66.3 ML/M2
LEFT VENTRICLE SYSTOLIC VOLUME INDEX: 66.3 ML/M2
LEFT VENTRICLE SYSTOLIC VOLUME: 117.39 ML
LEFT VENTRICLE SYSTOLIC VOLUME: 130 ML
LEFT VENTRICLE SYSTOLIC VOLUME: 132 ML
LEFT VENTRICULAR INTERNAL DIMENSION IN DIASTOLE: 5.8 CM (ref 3.5–6)
LEFT VENTRICULAR INTERNAL DIMENSION IN DIASTOLE: 5.98 CM (ref 3.5–6)
LEFT VENTRICULAR MASS: 198.09 G
LEFT VENTRICULAR MASS: 273.91 G
LEFT VERTEBRAL DIAS: 4 CM/S
LEFT VERTEBRAL SYS: 32 CM/S
LV LATERAL E/E' RATIO: 12.67 M/S
LV LATERAL E/E' RATIO: 6.83 M/S
LV SEPTAL E/E' RATIO: 19 M/S
LV SEPTAL E/E' RATIO: 20.5 M/S
LVOT MG: 1 MMHG
LVOT MV: 0.37 CM/S
LYMPHOCYTES # BLD AUTO: 1.3 K/UL (ref 1–4.8)
LYMPHOCYTES # BLD AUTO: 1.4 K/UL (ref 1–4.8)
LYMPHOCYTES # BLD AUTO: 1.47 X10(3)/MCL (ref 0.6–4.6)
LYMPHOCYTES # BLD AUTO: 1.5 K/UL (ref 1–4.8)
LYMPHOCYTES # BLD AUTO: 1.6 K/UL (ref 1–4.8)
LYMPHOCYTES # BLD AUTO: 1.75 X10(3)/MCL (ref 0.6–4.6)
LYMPHOCYTES # BLD AUTO: 1.83 X10(3)/MCL (ref 0.6–4.6)
LYMPHOCYTES # BLD AUTO: 1.98 X10(3)/MCL (ref 0.6–4.6)
LYMPHOCYTES # BLD AUTO: 2 K/UL (ref 1–4.8)
LYMPHOCYTES # BLD AUTO: 2.3 K/UL (ref 1–4.8)
LYMPHOCYTES # BLD AUTO: 2.62 X10(3)/MCL (ref 0.6–4.6)
LYMPHOCYTES # BLD AUTO: 3 X10(3)/MCL (ref 0.6–4.6)
LYMPHOCYTES # BLD AUTO: 4.1 K/UL (ref 1–4.8)
LYMPHOCYTES NFR BLD AUTO: 21.7 %
LYMPHOCYTES NFR BLD AUTO: 22 %
LYMPHOCYTES NFR BLD AUTO: 22.6 %
LYMPHOCYTES NFR BLD AUTO: 22.9 %
LYMPHOCYTES NFR BLD AUTO: 23.7 %
LYMPHOCYTES NFR BLD AUTO: 29.6 %
LYMPHOCYTES NFR BLD: 10.3 % (ref 18–48)
LYMPHOCYTES NFR BLD: 10.4 % (ref 18–48)
LYMPHOCYTES NFR BLD: 10.9 % (ref 18–48)
LYMPHOCYTES NFR BLD: 11.6 % (ref 18–48)
LYMPHOCYTES NFR BLD: 21.9 % (ref 18–48)
LYMPHOCYTES NFR BLD: 24 % (ref 18–48)
LYMPHOCYTES NFR BLD: 27.5 % (ref 18–48)
MAGNESIUM SERPL-MCNC: 1 MG/DL (ref 1.6–2.6)
MAGNESIUM SERPL-MCNC: 1.3 MG/DL (ref 1.6–2.6)
MAGNESIUM SERPL-MCNC: 1.3 MG/DL (ref 1.6–2.6)
MAGNESIUM SERPL-MCNC: 1.4 MG/DL (ref 1.6–2.6)
MAGNESIUM SERPL-MCNC: 1.5 MG/DL (ref 1.6–2.6)
MAGNESIUM SERPL-MCNC: 1.6 MG/DL (ref 1.6–2.6)
MAGNESIUM SERPL-MCNC: 1.6 MG/DL (ref 1.6–2.6)
MAGNESIUM SERPL-MCNC: 1.8 MG/DL (ref 1.6–2.6)
MAGNESIUM SERPL-MCNC: 1.8 MG/DL (ref 1.6–2.6)
MAGNESIUM SERPL-MCNC: 1.9 MG/DL (ref 1.6–2.6)
MAGNESIUM SERPL-MCNC: 1.9 MG/DL (ref 1.6–2.6)
MAGNESIUM SERPL-MCNC: 2.3 MG/DL (ref 1.6–2.6)
MAGNESIUM SERPL-MCNC: 2.5 MG/DL (ref 1.6–2.6)
MAP: 11
MCH RBC QN AUTO: 28.4 PG (ref 27–31)
MCH RBC QN AUTO: 28.9 PG (ref 27–31)
MCH RBC QN AUTO: 29 PG (ref 27–31)
MCH RBC QN AUTO: 29.3 PG (ref 27–31)
MCH RBC QN AUTO: 29.4 PG (ref 27–31)
MCH RBC QN AUTO: 29.4 PG (ref 27–31)
MCH RBC QN AUTO: 29.7 PG (ref 27–31)
MCH RBC QN AUTO: 30.1 PG (ref 27–31)
MCH RBC QN AUTO: 30.2 PG (ref 27–31)
MCH RBC QN AUTO: 30.3 PG (ref 27–31)
MCH RBC QN AUTO: 30.4 PG (ref 27–31)
MCH RBC QN AUTO: 30.6 PG (ref 27–31)
MCH RBC QN AUTO: 30.7 PG
MCH RBC QN AUTO: 30.8 PG (ref 27–31)
MCHC RBC AUTO-ENTMCNC: 29.5 G/DL (ref 33–36)
MCHC RBC AUTO-ENTMCNC: 30 G/DL (ref 32–36)
MCHC RBC AUTO-ENTMCNC: 30.9 G/DL (ref 33–36)
MCHC RBC AUTO-ENTMCNC: 31 MG/DL (ref 33–36)
MCHC RBC AUTO-ENTMCNC: 31.1 G/DL (ref 32–36)
MCHC RBC AUTO-ENTMCNC: 31.3 G/DL (ref 32–36)
MCHC RBC AUTO-ENTMCNC: 31.8 G/DL (ref 33–36)
MCHC RBC AUTO-ENTMCNC: 32 G/DL (ref 33–36)
MCHC RBC AUTO-ENTMCNC: 32.2 G/DL (ref 32–36)
MCHC RBC AUTO-ENTMCNC: 32.3 G/DL (ref 32–36)
MCHC RBC AUTO-ENTMCNC: 32.4 G/DL (ref 32–36)
MCHC RBC AUTO-ENTMCNC: 32.5 G/DL (ref 33–36)
MCHC RBC AUTO-ENTMCNC: 32.7 G/DL (ref 32–36)
MCHC RBC AUTO-ENTMCNC: 32.8 G/DL (ref 33–36)
MCV RBC AUTO: 88 FL (ref 82–98)
MCV RBC AUTO: 90 FL (ref 82–98)
MCV RBC AUTO: 92 FL (ref 82–98)
MCV RBC AUTO: 92.2 FL (ref 80–94)
MCV RBC AUTO: 92.4 FL (ref 80–94)
MCV RBC AUTO: 93 FL (ref 82–98)
MCV RBC AUTO: 94 FL (ref 80–94)
MCV RBC AUTO: 94 FL (ref 82–98)
MCV RBC AUTO: 94.2 FL (ref 80–94)
MCV RBC AUTO: 95 FL (ref 82–98)
MCV RBC AUTO: 98 FL (ref 82–98)
MCV RBC AUTO: 99 FL (ref 80–94)
MCV RBC AUTO: 99.5 FL (ref 80–94)
MCV RBC AUTO: 99.8 FL (ref 80–94)
MIN VOL: 10.1
MIN VOL: 10.5
MIN VOL: 10.9
MIN VOL: 9.35
MIN VOL: 9.51
MIN VOL: 9.54
MIN VOL: 9.58
MIN VOL: 9.83
MIN VOL: 9.97
MODE: ABNORMAL
MODE: NORMAL
MONOCYTES # BLD AUTO: 0.75 X10(3)/MCL (ref 0.1–1.3)
MONOCYTES # BLD AUTO: 0.93 X10(3)/MCL (ref 0.1–1.3)
MONOCYTES # BLD AUTO: 0.94 X10(3)/MCL (ref 0.1–1.3)
MONOCYTES # BLD AUTO: 0.99 X10(3)/MCL (ref 0.1–1.3)
MONOCYTES # BLD AUTO: 1 K/UL (ref 0.3–1)
MONOCYTES # BLD AUTO: 1.01 X10(3)/MCL (ref 0.1–1.3)
MONOCYTES # BLD AUTO: 1.1 K/UL (ref 0.3–1)
MONOCYTES # BLD AUTO: 1.11 X10(3)/MCL (ref 0.1–1.3)
MONOCYTES # BLD AUTO: 1.4 K/UL (ref 0.3–1)
MONOCYTES # BLD AUTO: 2 K/UL (ref 0.3–1)
MONOCYTES NFR BLD AUTO: 11.1 %
MONOCYTES NFR BLD AUTO: 11.7 %
MONOCYTES NFR BLD AUTO: 12 %
MONOCYTES NFR BLD AUTO: 12.4 %
MONOCYTES NFR BLD AUTO: 9.3 %
MONOCYTES NFR BLD AUTO: 9.6 %
MONOCYTES NFR BLD: 10.5 % (ref 4–15)
MONOCYTES NFR BLD: 10.5 % (ref 4–15)
MONOCYTES NFR BLD: 12.5 % (ref 4–15)
MONOCYTES NFR BLD: 12.8 % (ref 4–15)
MONOCYTES NFR BLD: 12.9 % (ref 4–15)
MONOCYTES NFR BLD: 9 % (ref 4–15)
MONOCYTES NFR BLD: 9.9 % (ref 4–15)
MV PEAK A VEL: 0.7 M/S
MV PEAK A VEL: 0.97 M/S
MV PEAK E VEL: 0.76 M/S
MV PEAK E VEL: 0.82 M/S
MV STENOSIS PRESSURE HALF TIME: 45.68 MS
MV VALVE AREA P 1/2 METHOD: 4.82 CM2
MVV LLN: 87
MVV PRE REF: 44.1 %
MVV REF: 102
NEUTROPHILS # BLD AUTO: 10.5 K/UL (ref 1.8–7.7)
NEUTROPHILS # BLD AUTO: 11 K/UL (ref 1.8–7.7)
NEUTROPHILS # BLD AUTO: 12.1 K/UL (ref 1.8–7.7)
NEUTROPHILS # BLD AUTO: 12.2 K/UL (ref 1.8–7.7)
NEUTROPHILS # BLD AUTO: 4.3 X10(3)/MCL (ref 2.1–9.2)
NEUTROPHILS # BLD AUTO: 4.71 X10(3)/MCL (ref 2.1–9.2)
NEUTROPHILS # BLD AUTO: 4.8 K/UL (ref 1.8–7.7)
NEUTROPHILS # BLD AUTO: 4.9 K/UL (ref 1.8–7.7)
NEUTROPHILS # BLD AUTO: 4.98 X10(3)/MCL (ref 2.1–9.2)
NEUTROPHILS # BLD AUTO: 5.36 X10(3)/MCL (ref 2.1–9.2)
NEUTROPHILS # BLD AUTO: 5.83 X10(3)/MCL (ref 2.1–9.2)
NEUTROPHILS # BLD AUTO: 7.46 X10(3)/MCL (ref 2.1–9.2)
NEUTROPHILS # BLD AUTO: 8.3 K/UL (ref 1.8–7.7)
NEUTROPHILS NFR BLD AUTO: 57.6 %
NEUTROPHILS NFR BLD AUTO: 61 %
NEUTROPHILS NFR BLD AUTO: 61.8 %
NEUTROPHILS NFR BLD AUTO: 62.5 %
NEUTROPHILS NFR BLD AUTO: 63.7 %
NEUTROPHILS NFR BLD AUTO: 64.3 %
NEUTROPHILS NFR BLD: 56 % (ref 38–73)
NEUTROPHILS NFR BLD: 59.8 % (ref 38–73)
NEUTROPHILS NFR BLD: 65.1 % (ref 38–73)
NEUTROPHILS NFR BLD: 74.8 % (ref 38–73)
NEUTROPHILS NFR BLD: 78.1 % (ref 38–73)
NEUTROPHILS NFR BLD: 78.3 % (ref 38–73)
NEUTROPHILS NFR BLD: 78.8 % (ref 38–73)
NRBC BLD AUTO-RTO: 0 %
NRBC BLD-RTO: 0 /100 WBC
NRBC BLD-RTO: 1 /100 WBC
NUC REST DIASTOLIC VOLUME INDEX: 140
NUC REST EJECTION FRACTION: 31
NUC REST SYSTOLIC VOLUME INDEX: 97
OHS CV CAROTID RIGHT ICA EDV HIGHEST: 33
OHS CV CAROTID ULTRASOUND LEFT ICA/CCA RATIO: 1.48
OHS CV CAROTID ULTRASOUND RIGHT ICA/CCA RATIO: 2.26
OHS CV PV CAROTID LEFT HIGHEST CCA: 77
OHS CV PV CAROTID LEFT HIGHEST ICA: 74
OHS CV PV CAROTID RIGHT HIGHEST CCA: 60
OHS CV PV CAROTID RIGHT HIGHEST ICA: 113
OHS CV US CAROTID LEFT HIGHEST EDV: 18
OHS LV EJECTION FRACTION SIMPSONS BIPLANE MOD: 2 %
OHS LV EJECTION FRACTION SIMPSONS BIPLANE MOD: 3 %
PCO2 BLDA: 31 MMHG (ref 35–45)
PCO2 BLDA: 31.3 MMHG (ref 35–45)
PCO2 BLDA: 32.4 MMHG (ref 35–45)
PCO2 BLDA: 32.5 MMHG (ref 35–45)
PCO2 BLDA: 32.7 MMHG (ref 35–45)
PCO2 BLDA: 34.1 MMHG (ref 35–45)
PCO2 BLDA: 34.3 MMHG (ref 35–45)
PCO2 BLDA: 34.5 MMHG (ref 35–45)
PCO2 BLDA: 34.9 MMHG (ref 35–45)
PCO2 BLDA: 35 MMHG (ref 35–45)
PCO2 BLDA: 35 MMHG (ref 35–45)
PCO2 BLDA: 37.2 MMHG (ref 35–45)
PCO2 BLDA: 37.6 MMHG (ref 35–45)
PCO2 BLDA: 38.2 MMHG (ref 35–45)
PCO2 BLDA: 38.2 MMHG (ref 35–45)
PCO2 BLDA: 38.4 MMHG (ref 35–45)
PCO2 BLDA: 38.9 MMHG (ref 35–45)
PCO2 BLDA: 40.9 MMHG (ref 35–45)
PCO2 BLDA: 40.9 MMHG (ref 35–45)
PCO2 BLDA: 41.6 MMHG (ref 35–45)
PCO2 BLDA: 41.7 MMHG (ref 35–45)
PCO2 BLDA: 41.8 MMHG (ref 35–45)
PCO2 BLDA: 42.2 MMHG (ref 35–45)
PCO2 BLDA: 43.2 MMHG (ref 35–45)
PCO2 BLDA: 45.7 MMHG (ref 35–45)
PCO2 BLDA: 47 MMHG (ref 35–45)
PCO2 BLDA: 47.8 MMHG (ref 35–45)
PCO2 BLDA: 48 MMHG (ref 35–45)
PCO2 BLDA: 48.5 MMHG (ref 35–45)
PCO2 BLDA: 49 MMHG (ref 35–45)
PCO2 BLDA: 50.9 MMHG (ref 35–45)
PCO2 BLDA: 51.3 MMHG (ref 35–45)
PCO2 BLDA: 55.3 MMHG (ref 35–45)
PCO2 BLDA: 57.2 MMHG (ref 35–45)
PEEP: 5
PEEP: 8
PEEP: 8
PEF LLN: 5.17
PEF PRE REF: 45.9 %
PEF REF: 7.17
PERFUSION DEFECT 1 SIZE IN %: 2 %
PH SMN: 6.98 [PH] (ref 7.35–7.45)
PH SMN: 7.13 [PH] (ref 7.35–7.45)
PH SMN: 7.29 [PH] (ref 7.35–7.45)
PH SMN: 7.3 [PH] (ref 7.35–7.45)
PH SMN: 7.31 [PH] (ref 7.35–7.45)
PH SMN: 7.31 [PH] (ref 7.35–7.45)
PH SMN: 7.33 [PH] (ref 7.35–7.45)
PH SMN: 7.34 [PH] (ref 7.35–7.45)
PH SMN: 7.34 [PH] (ref 7.35–7.45)
PH SMN: 7.35 [PH] (ref 7.35–7.45)
PH SMN: 7.36 [PH] (ref 7.35–7.45)
PH SMN: 7.36 [PH] (ref 7.35–7.45)
PH SMN: 7.37 [PH] (ref 7.35–7.45)
PH SMN: 7.37 [PH] (ref 7.35–7.45)
PH SMN: 7.38 [PH] (ref 7.35–7.45)
PH SMN: 7.39 [PH] (ref 7.35–7.45)
PH SMN: 7.39 [PH] (ref 7.35–7.45)
PH SMN: 7.4 [PH] (ref 7.35–7.45)
PH SMN: 7.41 [PH] (ref 7.35–7.45)
PH SMN: 7.41 [PH] (ref 7.35–7.45)
PH SMN: 7.42 [PH] (ref 7.35–7.45)
PH SMN: 7.42 [PH] (ref 7.35–7.45)
PH SMN: 7.43 [PH] (ref 7.35–7.45)
PH SMN: 7.45 [PH] (ref 7.35–7.45)
PH SMN: 7.45 [PH] (ref 7.35–7.45)
PH SMN: 7.47 [PH] (ref 7.35–7.45)
PH SMN: 7.49 [PH] (ref 7.35–7.45)
PH SMN: 7.53 [PH] (ref 7.35–7.45)
PHOSPHATE SERPL-MCNC: 2.7 MG/DL (ref 2.7–4.5)
PHOSPHATE SERPL-MCNC: 3.1 MG/DL (ref 2.7–4.5)
PHOSPHATE SERPL-MCNC: 3.3 MG/DL (ref 2.7–4.5)
PHOSPHATE SERPL-MCNC: 3.4 MG/DL (ref 2.7–4.5)
PHOSPHATE SERPL-MCNC: 3.4 MG/DL (ref 2.7–4.5)
PHOSPHATE SERPL-MCNC: 4.6 MG/DL (ref 2.7–4.5)
PHOSPHATE SERPL-MCNC: 5.5 MG/DL (ref 2.7–4.5)
PHYSICIAN COMMENT: ABNORMAL
PIP: 21
PIP: 23
PISA TR MAX VEL: 1.98 M/S
PISA TR MAX VEL: 2.73 M/S
PLATELET # BLD AUTO: 102 K/UL (ref 150–450)
PLATELET # BLD AUTO: 109 K/UL (ref 150–450)
PLATELET # BLD AUTO: 123 K/UL (ref 150–450)
PLATELET # BLD AUTO: 125 K/UL (ref 150–450)
PLATELET # BLD AUTO: 169 X10(3)/MCL (ref 130–400)
PLATELET # BLD AUTO: 170 X10(3)/MCL (ref 130–400)
PLATELET # BLD AUTO: 186 X10(3)/MCL (ref 130–400)
PLATELET # BLD AUTO: 189 K/UL (ref 150–450)
PLATELET # BLD AUTO: 189 X10(3)/MCL (ref 130–400)
PLATELET # BLD AUTO: 199 K/UL (ref 150–450)
PLATELET # BLD AUTO: 205 X10(3)/MCL (ref 130–400)
PLATELET # BLD AUTO: 221 X10(3)/MCL (ref 130–400)
PLATELET # BLD AUTO: 349 X10(3)/MCL (ref 130–400)
PLATELET # BLD AUTO: 82 K/UL (ref 150–450)
PMV BLD AUTO: 10.2 FL (ref 9.2–12.9)
PMV BLD AUTO: 10.3 FL (ref 7.4–10.4)
PMV BLD AUTO: 10.6 FL (ref 9.2–12.9)
PMV BLD AUTO: 10.6 FL (ref 9.2–12.9)
PMV BLD AUTO: 10.7 FL (ref 9.2–12.9)
PMV BLD AUTO: 10.8 FL (ref 7.4–10.4)
PMV BLD AUTO: 10.8 FL (ref 9.2–12.9)
PMV BLD AUTO: 10.9 FL (ref 7.4–10.4)
PMV BLD AUTO: 10.9 FL (ref 7.4–10.4)
PMV BLD AUTO: 10.9 FL (ref 9.2–12.9)
PMV BLD AUTO: 11.1 FL (ref 9.2–12.9)
PMV BLD AUTO: 9.8 FL (ref 7.4–10.4)
PMV BLD AUTO: 9.8 FL (ref 7.4–10.4)
PMV BLD AUTO: 9.9 FL (ref 7.4–10.4)
PO2 BLDA: 104 MMHG (ref 80–100)
PO2 BLDA: 108 MMHG (ref 80–100)
PO2 BLDA: 115 MMHG (ref 80–100)
PO2 BLDA: 146 MMHG (ref 80–100)
PO2 BLDA: 161 MMHG (ref 80–100)
PO2 BLDA: 20 MMHG (ref 40–60)
PO2 BLDA: 21 MMHG (ref 40–60)
PO2 BLDA: 239 MMHG (ref 80–100)
PO2 BLDA: 24 MMHG (ref 40–60)
PO2 BLDA: 25 MMHG (ref 40–60)
PO2 BLDA: 26 MMHG (ref 40–60)
PO2 BLDA: 26 MMHG (ref 40–60)
PO2 BLDA: 261 MMHG (ref 80–100)
PO2 BLDA: 264 MMHG (ref 80–100)
PO2 BLDA: 27 MMHG (ref 40–60)
PO2 BLDA: 29 MMHG (ref 40–60)
PO2 BLDA: 31 MMHG (ref 80–100)
PO2 BLDA: 322 MMHG (ref 80–100)
PO2 BLDA: 380 MMHG (ref 80–100)
PO2 BLDA: 384 MMHG (ref 80–100)
PO2 BLDA: 52 MMHG (ref 40–60)
PO2 BLDA: 58 MMHG (ref 80–100)
PO2 BLDA: 581 MMHG (ref 80–100)
PO2 BLDA: 61 MMHG (ref 80–100)
PO2 BLDA: 65 MMHG (ref 80–100)
PO2 BLDA: 70 MMHG (ref 80–100)
PO2 BLDA: 73 MMHG (ref 80–100)
PO2 BLDA: 74 MMHG (ref 80–100)
PO2 BLDA: 77 MMHG (ref 80–100)
PO2 BLDA: 84 MMHG (ref 80–100)
PO2 BLDA: 87 MMHG (ref 80–100)
PO2 BLDA: 90 MMHG (ref 80–100)
PO2 BLDA: 91 MMHG (ref 80–100)
PO2 BLDA: 92 MMHG (ref 80–100)
PO2 BLDA: 93 MMHG (ref 80–100)
PO2 BLDA: 93 MMHG (ref 80–100)
PO2 BLDA: 97 MMHG (ref 80–100)
PO2 BLDA: 99 MMHG (ref 80–100)
POC ACTIVATED CLOTTING TIME K: 107 SEC (ref 74–137)
POC ACTIVATED CLOTTING TIME K: 137 SEC (ref 74–137)
POC BE: -1 MMOL/L
POC BE: -1 MMOL/L
POC BE: -13 MMOL/L
POC BE: -18 MMOL/L
POC BE: -2 MMOL/L
POC BE: -3 MMOL/L
POC BE: -4 MMOL/L
POC BE: -5 MMOL/L
POC BE: -6 MMOL/L
POC BE: -7 MMOL/L
POC BE: 0 MMOL/L
POC BE: 1 MMOL/L
POC BE: 1 MMOL/L
POC BE: 3 MMOL/L
POC BE: 3 MMOL/L
POC BE: 5 MMOL/L
POC BE: 6 MMOL/L
POC BE: 7 MMOL/L
POC IONIZED CALCIUM: 0.92 MMOL/L (ref 1.06–1.42)
POC IONIZED CALCIUM: 0.99 MMOL/L (ref 1.06–1.42)
POC IONIZED CALCIUM: 0.99 MMOL/L (ref 1.06–1.42)
POC IONIZED CALCIUM: 1 MMOL/L (ref 1.06–1.42)
POC IONIZED CALCIUM: 1.02 MMOL/L (ref 1.06–1.42)
POC IONIZED CALCIUM: 1.02 MMOL/L (ref 1.06–1.42)
POC IONIZED CALCIUM: 1.03 MMOL/L (ref 1.06–1.42)
POC IONIZED CALCIUM: 1.07 MMOL/L (ref 1.06–1.42)
POC IONIZED CALCIUM: 1.09 MMOL/L (ref 1.06–1.42)
POC IONIZED CALCIUM: 1.1 MMOL/L (ref 1.06–1.42)
POC IONIZED CALCIUM: 1.11 MMOL/L (ref 1.06–1.42)
POC IONIZED CALCIUM: 1.11 MMOL/L (ref 1.06–1.42)
POC IONIZED CALCIUM: 1.12 MMOL/L (ref 1.06–1.42)
POC IONIZED CALCIUM: 1.13 MMOL/L (ref 1.06–1.42)
POC IONIZED CALCIUM: 1.14 MMOL/L (ref 1.06–1.42)
POC IONIZED CALCIUM: 1.15 MMOL/L (ref 1.06–1.42)
POC IONIZED CALCIUM: 1.16 MMOL/L (ref 1.06–1.42)
POC IONIZED CALCIUM: 1.16 MMOL/L (ref 1.06–1.42)
POC IONIZED CALCIUM: 1.19 MMOL/L (ref 1.06–1.42)
POC IONIZED CALCIUM: 1.2 MMOL/L (ref 1.06–1.42)
POC IONIZED CALCIUM: 1.42 MMOL/L (ref 1.06–1.42)
POC IONIZED CALCIUM: 1.6 MMOL/L (ref 1.06–1.42)
POC IONIZED CALCIUM: 1.75 MMOL/L (ref 1.06–1.42)
POC SATURATED O2: 100 % (ref 95–100)
POC SATURATED O2: 27 % (ref 95–100)
POC SATURATED O2: 33 % (ref 95–100)
POC SATURATED O2: 37 % (ref 95–100)
POC SATURATED O2: 41 % (ref 95–100)
POC SATURATED O2: 42 % (ref 95–100)
POC SATURATED O2: 43 % (ref 95–100)
POC SATURATED O2: 43 % (ref 95–100)
POC SATURATED O2: 51 % (ref 95–100)
POC SATURATED O2: 55 % (ref 95–100)
POC SATURATED O2: 81 % (ref 95–100)
POC SATURATED O2: 88 % (ref 95–100)
POC SATURATED O2: 91 % (ref 95–100)
POC SATURATED O2: 93 % (ref 95–100)
POC SATURATED O2: 93 % (ref 95–100)
POC SATURATED O2: 94 % (ref 95–100)
POC SATURATED O2: 94 % (ref 95–100)
POC SATURATED O2: 95 % (ref 95–100)
POC SATURATED O2: 95 % (ref 95–100)
POC SATURATED O2: 96 % (ref 95–100)
POC SATURATED O2: 97 % (ref 95–100)
POC SATURATED O2: 98 % (ref 95–100)
POC SATURATED O2: 98 % (ref 95–100)
POC SATURATED O2: 99 % (ref 95–100)
POC TCO2: 15 MMOL/L (ref 23–27)
POC TCO2: 18 MMOL/L (ref 23–27)
POC TCO2: 20 MMOL/L (ref 23–27)
POC TCO2: 20 MMOL/L (ref 23–27)
POC TCO2: 21 MMOL/L (ref 23–27)
POC TCO2: 21 MMOL/L (ref 23–27)
POC TCO2: 22 MMOL/L (ref 23–27)
POC TCO2: 23 MMOL/L (ref 23–27)
POC TCO2: 24 MMOL/L (ref 24–29)
POC TCO2: 25 MMOL/L (ref 23–27)
POC TCO2: 25 MMOL/L (ref 24–29)
POC TCO2: 26 MMOL/L (ref 23–27)
POC TCO2: 26 MMOL/L (ref 24–29)
POC TCO2: 26 MMOL/L (ref 24–29)
POC TCO2: 27 MMOL/L (ref 23–27)
POC TCO2: 27 MMOL/L (ref 23–27)
POC TCO2: 27 MMOL/L (ref 24–29)
POC TCO2: 28 MMOL/L (ref 23–27)
POC TCO2: 28 MMOL/L (ref 24–29)
POC TCO2: 28 MMOL/L (ref 24–29)
POC TCO2: 29 MMOL/L (ref 23–27)
POC TCO2: 30 MMOL/L (ref 23–27)
POC TCO2: 30 MMOL/L (ref 23–27)
POC TCO2: 31 MMOL/L (ref 23–27)
POCT GLUCOSE: 103 MG/DL (ref 70–110)
POCT GLUCOSE: 104 MG/DL (ref 70–110)
POCT GLUCOSE: 108 MG/DL (ref 70–110)
POCT GLUCOSE: 109 MG/DL (ref 70–110)
POCT GLUCOSE: 110 MG/DL (ref 70–110)
POCT GLUCOSE: 111 MG/DL (ref 70–110)
POCT GLUCOSE: 111 MG/DL (ref 70–110)
POCT GLUCOSE: 118 MG/DL (ref 70–110)
POCT GLUCOSE: 122 MG/DL (ref 70–110)
POCT GLUCOSE: 123 MG/DL (ref 70–110)
POCT GLUCOSE: 124 MG/DL (ref 70–110)
POCT GLUCOSE: 126 MG/DL (ref 70–110)
POCT GLUCOSE: 126 MG/DL (ref 70–110)
POCT GLUCOSE: 127 MG/DL (ref 70–110)
POCT GLUCOSE: 128 MG/DL (ref 70–110)
POCT GLUCOSE: 128 MG/DL (ref 70–110)
POCT GLUCOSE: 130 MG/DL (ref 70–110)
POCT GLUCOSE: 134 MG/DL (ref 70–110)
POCT GLUCOSE: 134 MG/DL (ref 70–110)
POCT GLUCOSE: 138 MG/DL (ref 70–110)
POCT GLUCOSE: 138 MG/DL (ref 70–110)
POCT GLUCOSE: 147 MG/DL (ref 70–110)
POCT GLUCOSE: 150 MG/DL (ref 70–110)
POCT GLUCOSE: 152 MG/DL (ref 70–110)
POCT GLUCOSE: 153 MG/DL (ref 70–110)
POCT GLUCOSE: 153 MG/DL (ref 70–110)
POCT GLUCOSE: 157 MG/DL (ref 70–110)
POCT GLUCOSE: 159 MG/DL (ref 70–110)
POCT GLUCOSE: 160 MG/DL (ref 70–110)
POCT GLUCOSE: 171 MG/DL (ref 70–110)
POCT GLUCOSE: 173 MG/DL (ref 70–110)
POCT GLUCOSE: 222 MG/DL (ref 70–110)
POCT GLUCOSE: 233 MG/DL (ref 70–110)
POCT GLUCOSE: 235 MG/DL (ref 70–110)
POCT GLUCOSE: 307 MG/DL (ref 70–110)
POCT GLUCOSE: 90 MG/DL (ref 70–110)
POCT GLUCOSE: 99 MG/DL (ref 70–110)
POCT GLUCOSE: 99 MG/DL (ref 70–110)
POTASSIUM BLD-SCNC: 3.6 MMOL/L (ref 3.5–5.1)
POTASSIUM BLD-SCNC: 3.7 MMOL/L (ref 3.5–5.1)
POTASSIUM BLD-SCNC: 3.8 MMOL/L (ref 3.5–5.1)
POTASSIUM BLD-SCNC: 4 MMOL/L (ref 3.5–5.1)
POTASSIUM BLD-SCNC: 4.1 MMOL/L (ref 3.5–5.1)
POTASSIUM BLD-SCNC: 4.2 MMOL/L (ref 3.5–5.1)
POTASSIUM BLD-SCNC: 4.3 MMOL/L (ref 3.5–5.1)
POTASSIUM BLD-SCNC: 4.3 MMOL/L (ref 3.5–5.1)
POTASSIUM BLD-SCNC: 4.4 MMOL/L (ref 3.5–5.1)
POTASSIUM BLD-SCNC: 4.5 MMOL/L (ref 3.5–5.1)
POTASSIUM BLD-SCNC: 4.5 MMOL/L (ref 3.5–5.1)
POTASSIUM BLD-SCNC: 4.6 MMOL/L (ref 3.5–5.1)
POTASSIUM BLD-SCNC: 4.8 MMOL/L (ref 3.5–5.1)
POTASSIUM BLD-SCNC: 4.8 MMOL/L (ref 3.5–5.1)
POTASSIUM BLD-SCNC: 5 MMOL/L (ref 3.5–5.1)
POTASSIUM BLD-SCNC: 5 MMOL/L (ref 3.5–5.1)
POTASSIUM BLD-SCNC: 5.1 MMOL/L (ref 3.5–5.1)
POTASSIUM BLD-SCNC: 5.2 MMOL/L (ref 3.5–5.1)
POTASSIUM BLD-SCNC: 5.2 MMOL/L (ref 3.5–5.1)
POTASSIUM BLD-SCNC: 5.3 MMOL/L (ref 3.5–5.1)
POTASSIUM BLD-SCNC: 5.4 MMOL/L (ref 3.5–5.1)
POTASSIUM SERPL-SCNC: 3.5 MMOL/L (ref 3.5–5.1)
POTASSIUM SERPL-SCNC: 3.5 MMOL/L (ref 3.5–5.1)
POTASSIUM SERPL-SCNC: 3.6 MMOL/L (ref 3.5–5.1)
POTASSIUM SERPL-SCNC: 3.7 MMOL/L (ref 3.5–5.1)
POTASSIUM SERPL-SCNC: 3.8 MMOL/L (ref 3.5–5.1)
POTASSIUM SERPL-SCNC: 3.9 MMOL/L (ref 3.5–5.1)
POTASSIUM SERPL-SCNC: 4.1 MMOL/L (ref 3.5–5.1)
POTASSIUM SERPL-SCNC: 4.2 MMOL/L (ref 3.5–5.1)
POTASSIUM SERPL-SCNC: 4.4 MMOL/L (ref 3.5–5.1)
POTASSIUM SERPL-SCNC: 4.5 MMOL/L (ref 3.5–5.1)
POTASSIUM SERPL-SCNC: 4.6 MMOL/L (ref 3.5–5.1)
POTASSIUM SERPL-SCNC: 4.7 MMOL/L (ref 3.5–5.1)
POTASSIUM SERPL-SCNC: 4.8 MMOL/L (ref 3.5–5.1)
POTASSIUM SERPL-SCNC: 5.1 MMOL/L (ref 3.5–5.1)
POTASSIUM SERPL-SCNC: 5.3 MMOL/L (ref 3.5–5.1)
PRE DLCO: 6.1 ML/(MIN*MMHG) (ref 15.85–29.7)
PRE FEF 25 75: 1.32 L/S (ref 0.89–3.79)
PRE FET 100: 7.3 SEC
PRE FEV05 REF: 60.5 %
PRE FEV1 FVC: 76.93 % (ref 62.83–88.88)
PRE FEV1: 1.63 L (ref 1.92–3.38)
PRE FEV5: 1.31 L (ref 1.02–3.29)
PRE FVC: 2.12 L (ref 2.58–4.43)
PRE MVV: 44.97 L/MIN (ref 86.71–117.31)
PRE PEF: 3.29 L/S (ref 5.17–9.17)
PROT SERPL-MCNC: 4.8 G/DL (ref 6–8.4)
PROT SERPL-MCNC: 5 G/DL (ref 6–8.4)
PROT SERPL-MCNC: 5.3 G/DL (ref 6–8.4)
PROT SERPL-MCNC: 5.5 G/DL (ref 6–8.4)
PROT SERPL-MCNC: 5.5 G/DL (ref 6–8.4)
PROT SERPL-MCNC: 6.3 GM/DL (ref 5.8–7.6)
PROT SERPL-MCNC: 7.2 GM/DL (ref 5.8–7.6)
PROT SERPL-MCNC: 7.3 G/DL (ref 6–8.4)
PROT SERPL-MCNC: 7.3 GM/DL (ref 5.8–7.6)
PROT SERPL-MCNC: 7.4 GM/DL (ref 5.8–7.6)
PROTHROMBIN TIME: 12.1 SEC (ref 9–12.5)
PROTHROMBIN TIME: 12.5 SEC (ref 9–12.5)
PROTHROMBIN TIME: 12.7 SEC (ref 9–12.5)
PROTHROMBIN TIME: 13.2 SEC (ref 9–12.5)
PROTHROMBIN TIME: 15.7 SEC (ref 9–12.5)
PROTHROMBIN TIME: 16.1 SECONDS (ref 12.5–14.5)
PROTHROMBIN TIME: 16.3 SEC (ref 9–12.5)
PROTHROMBIN TIME: 16.4 SEC (ref 9–12.5)
PROTHROMBIN TIME: 17.6 SEC (ref 9–12.5)
PROTHROMBIN TIME: 18.9 SEC (ref 9–12.5)
PS: 8
PSA SERPL-MCNC: 0.34 NG/ML
PV PEAK VELOCITY: 1.17 CM/S
RA MAJOR: 5.16 CM
RA PRESSURE ESTIMATED: 3 MMHG
RA PRESSURE: 8 MMHG
RA WIDTH: 4.55 CM
RBC # BLD AUTO: 2.22 M/UL (ref 4.6–6.2)
RBC # BLD AUTO: 2.72 M/UL (ref 4.6–6.2)
RBC # BLD AUTO: 2.84 M/UL (ref 4.6–6.2)
RBC # BLD AUTO: 3.06 M/UL (ref 4.6–6.2)
RBC # BLD AUTO: 3.24 M/UL (ref 4.6–6.2)
RBC # BLD AUTO: 3.59 X10(6)/MCL (ref 4.7–6.1)
RBC # BLD AUTO: 3.7 X10(6)/MCL (ref 4.7–6.1)
RBC # BLD AUTO: 3.82 X10(6)/MCL (ref 4.7–6.1)
RBC # BLD AUTO: 3.98 M/UL (ref 4.6–6.2)
RBC # BLD AUTO: 4 X10(6)/MCL (ref 4.7–6.1)
RBC # BLD AUTO: 4.11 X10(6)/MCL (ref 4.7–6.1)
RBC # BLD AUTO: 4.15 X10(6)/MCL (ref 4.7–6.1)
RBC # BLD AUTO: 4.22 X10(6)/MCL (ref 4.7–6.1)
RBC # BLD AUTO: 4.27 M/UL (ref 4.6–6.2)
RETIRED EF AND QEF - SEE NOTES: 47 %
RIGHT ATRIAL AREA: 18 CM2
RIGHT CCA DIST DIAS: 9 CM/S
RIGHT CCA DIST SYS: 50 CM/S
RIGHT CCA PROX DIAS: 10 CM/S
RIGHT CCA PROX SYS: 60 CM/S
RIGHT ECA DIAS: 6 CM/S
RIGHT ECA SYS: 84 CM/S
RIGHT ICA DIST DIAS: 12 CM/S
RIGHT ICA DIST SYS: 73 CM/S
RIGHT ICA MID DIAS: 33 CM/S
RIGHT ICA MID SYS: 113 CM/S
RIGHT ICA PROX DIAS: 12 CM/S
RIGHT ICA PROX SYS: 92 CM/S
RIGHT VENTRICULAR END-DIASTOLIC DIMENSION: 4.75 CM
RIGHT VENTRICULAR END-DIASTOLIC DIMENSION: 5.15 CM
RIGHT VERTEBRAL DIAS: 3 CM/S
RIGHT VERTEBRAL SYS: 31 CM/S
RV TB RVSP: 6 MMHG
RV TISSUE DOPPLER FREE WALL SYSTOLIC VELOCITY 1 (APICAL 4 CHAMBER VIEW): 4.73 CM/S
SAMPLE: ABNORMAL
SAMPLE: NORMAL
SINUS: 3.17 CM
SITE: ABNORMAL
SITE: NORMAL
SODIUM BLD-SCNC: 134 MMOL/L (ref 136–145)
SODIUM BLD-SCNC: 135 MMOL/L (ref 136–145)
SODIUM BLD-SCNC: 136 MMOL/L (ref 136–145)
SODIUM BLD-SCNC: 137 MMOL/L (ref 136–145)
SODIUM BLD-SCNC: 139 MMOL/L (ref 136–145)
SODIUM BLD-SCNC: 140 MMOL/L (ref 136–145)
SODIUM BLD-SCNC: 141 MMOL/L (ref 136–145)
SODIUM BLD-SCNC: 142 MMOL/L (ref 136–145)
SODIUM BLD-SCNC: 143 MMOL/L (ref 136–145)
SODIUM SERPL-SCNC: 135 MMOL/L (ref 136–145)
SODIUM SERPL-SCNC: 136 MMOL/L (ref 136–145)
SODIUM SERPL-SCNC: 137 MMOL/L (ref 136–145)
SODIUM SERPL-SCNC: 137 MMOL/L (ref 136–145)
SODIUM SERPL-SCNC: 138 MMOL/L (ref 136–145)
SODIUM SERPL-SCNC: 138 MMOL/L (ref 136–145)
SODIUM SERPL-SCNC: 139 MMOL/L (ref 136–145)
SODIUM SERPL-SCNC: 139 MMOL/L (ref 136–145)
SODIUM SERPL-SCNC: 140 MMOL/L (ref 136–145)
SODIUM SERPL-SCNC: 140 MMOL/L (ref 136–145)
SODIUM SERPL-SCNC: 141 MMOL/L (ref 136–145)
SODIUM SERPL-SCNC: 141 MMOL/L (ref 136–145)
SODIUM SERPL-SCNC: 143 MMOL/L (ref 136–145)
SP02: 100
SP02: 90
SP02: 90
SP02: 91
SP02: 97
SP02: 98
SPECIMEN OUTDATE: NORMAL
SPONT RATE: 17
SPONT RATE: 17
SPONT RATE: 19
SPONT RATE: 20
SPONT RATE: 21
SPONT RATE: 22
SPONT RATE: 22
SPONT RATE: 23
SPONT RATE: 27
SPONT RATE: 27
STJ: 2.8 CM
T3RU NFR SERPL: 40.1 % (ref 31–39)
T3RU NFR SERPL: 41.74 % (ref 31–39)
T4 SERPL-MCNC: 5.72 UG/DL (ref 4.87–11.72)
T4 SERPL-MCNC: 6.78 UG/DL (ref 4.87–11.72)
TDI LATERAL: 0.06 M/S
TDI LATERAL: 0.12 M/S
TDI SEPTAL: 0.04 M/S
TDI SEPTAL: 0.04 M/S
TDI: 0.05 M/S
TDI: 0.08 M/S
TIBC SERPL-MCNC: 207 UG/DL (ref 69–240)
TIBC SERPL-MCNC: 246 UG/DL (ref 250–450)
TR MAX PG: 16 MMHG
TR MAX PG: 30 MMHG
TRANSFERRIN SERPL-MCNC: 216 MG/DL (ref 163–344)
TRICUSPID ANNULAR PLANE SYSTOLIC EXCURSION: 0.91 CM
TRICUSPID ANNULAR PLANE SYSTOLIC EXCURSION: 0.97 CM
TRIGL SERPL-MCNC: 62 MG/DL (ref 34–140)
TSH SERPL-ACNC: 1.73 UIU/ML (ref 0.35–4.94)
TSH SERPL-ACNC: 1.87 UIU/ML (ref 0.35–4.94)
TV REST PULMONARY ARTERY PRESSURE: 24 MMHG
TV REST PULMONARY ARTERY PRESSURE: 33 MMHG
UNIT NUMBER: NORMAL
VA PRE: 2.86 L (ref 5.96–5.96)
VA SINGLE BREATH LLN: 5.96
VA SINGLE BREATH PRE REF: 48 %
VA SINGLE BREATH REF: 5.96
VASINGLEBREATHULN: 5.96
VLDLC SERPL CALC-MCNC: 12 MG/DL
VT: 420
VT: 425
VT: 425
VT: 432
VT: 450
WBC # BLD AUTO: 11.03 K/UL (ref 3.9–12.7)
WBC # BLD AUTO: 13.44 K/UL (ref 3.9–12.7)
WBC # BLD AUTO: 14.03 K/UL (ref 3.9–12.7)
WBC # BLD AUTO: 15.34 K/UL (ref 3.9–12.7)
WBC # BLD AUTO: 18.74 K/UL (ref 3.9–12.7)
WBC # BLD AUTO: 8.25 K/UL (ref 3.9–12.7)
WBC # BLD AUTO: 8.5 K/UL (ref 3.9–12.7)
WBC # SPEC AUTO: 10.12 X10(3)/MCL (ref 4.5–11.5)
WBC # SPEC AUTO: 11.6 X10(3)/MCL (ref 4.5–11.5)
WBC # SPEC AUTO: 6.76 X10(3)/MCL (ref 4.5–11.5)
WBC # SPEC AUTO: 7.73 X10(3)/MCL (ref 4.5–11.5)
WBC # SPEC AUTO: 7.73 X10(3)/MCL (ref 4.5–11.5)
WBC # SPEC AUTO: 7.97 X10(3)/MCL (ref 4.5–11.5)
WBC # SPEC AUTO: 8.66 X10(3)/MCL (ref 4.5–11.5)
Z-SCORE OF LEFT VENTRICULAR DIMENSION IN END DIASTOLE: -0.36
Z-SCORE OF LEFT VENTRICULAR DIMENSION IN END SYSTOLE: 2.38

## 2023-01-01 PROCEDURE — 25000003 PHARM REV CODE 250: Performed by: INTERNAL MEDICINE

## 2023-01-01 PROCEDURE — 99291 PR CRITICAL CARE, E/M 30-74 MINUTES: ICD-10-PCS | Mod: ,,, | Performed by: ANESTHESIOLOGY

## 2023-01-01 PROCEDURE — 21400001 HC TELEMETRY ROOM

## 2023-01-01 PROCEDURE — 25000003 PHARM REV CODE 250: Performed by: STUDENT IN AN ORGANIZED HEALTH CARE EDUCATION/TRAINING PROGRAM

## 2023-01-01 PROCEDURE — 85610 PROTHROMBIN TIME: CPT | Mod: 91

## 2023-01-01 PROCEDURE — 36592 COLLECT BLOOD FROM PICC: CPT

## 2023-01-01 PROCEDURE — 70450 CT HEAD/BRAIN W/O DYE: CPT | Mod: TC

## 2023-01-01 PROCEDURE — 85610 PROTHROMBIN TIME: CPT | Performed by: THORACIC SURGERY (CARDIOTHORACIC VASCULAR SURGERY)

## 2023-01-01 PROCEDURE — 99291 CRITICAL CARE FIRST HOUR: CPT | Mod: ,,, | Performed by: ANESTHESIOLOGY

## 2023-01-01 PROCEDURE — 63600175 PHARM REV CODE 636 W HCPCS: Performed by: INTERNAL MEDICINE

## 2023-01-01 PROCEDURE — 99999 PR PBB SHADOW E&M-EST. PATIENT-LVL IV: CPT | Mod: PBBFAC,,, | Performed by: THORACIC SURGERY (CARDIOTHORACIC VASCULAR SURGERY)

## 2023-01-01 PROCEDURE — 25000003 PHARM REV CODE 250

## 2023-01-01 PROCEDURE — 85025 COMPLETE CBC W/AUTO DIFF WBC: CPT

## 2023-01-01 PROCEDURE — 64488 TAP BLOCK BI INJECTION: CPT | Performed by: STUDENT IN AN ORGANIZED HEALTH CARE EDUCATION/TRAINING PROGRAM

## 2023-01-01 PROCEDURE — 93459 L HRT ART/GRFT ANGIO: CPT | Performed by: INTERNAL MEDICINE

## 2023-01-01 PROCEDURE — 99900035 HC TECH TIME PER 15 MIN (STAT)

## 2023-01-01 PROCEDURE — 63600175 PHARM REV CODE 636 W HCPCS

## 2023-01-01 PROCEDURE — 20600001 HC STEP DOWN PRIVATE ROOM

## 2023-01-01 PROCEDURE — 64488 RECTUS SHEATH BLOCK: ICD-10-PCS | Mod: 59,,, | Performed by: ANESTHESIOLOGY

## 2023-01-01 PROCEDURE — 99223 PR INITIAL HOSPITAL CARE,LEVL III: ICD-10-PCS | Mod: ,,, | Performed by: NURSE PRACTITIONER

## 2023-01-01 PROCEDURE — 85730 THROMBOPLASTIN TIME PARTIAL: CPT | Performed by: INTERNAL MEDICINE

## 2023-01-01 PROCEDURE — 64999 TTP BLOCK: ICD-10-PCS | Mod: ,,, | Performed by: ANESTHESIOLOGY

## 2023-01-01 PROCEDURE — 80053 COMPREHEN METABOLIC PANEL: CPT | Performed by: STUDENT IN AN ORGANIZED HEALTH CARE EDUCATION/TRAINING PROGRAM

## 2023-01-01 PROCEDURE — 93005 ELECTROCARDIOGRAM TRACING: CPT

## 2023-01-01 PROCEDURE — D9220A PRA ANESTHESIA: Mod: ,,, | Performed by: ANESTHESIOLOGY

## 2023-01-01 PROCEDURE — 84479 ASSAY OF THYROID (T3 OR T4): CPT

## 2023-01-01 PROCEDURE — 99499 UNLISTED E&M SERVICE: CPT | Mod: S$PBB,,, | Performed by: THORACIC SURGERY (CARDIOTHORACIC VASCULAR SURGERY)

## 2023-01-01 PROCEDURE — 84132 ASSAY OF SERUM POTASSIUM: CPT

## 2023-01-01 PROCEDURE — 94761 N-INVAS EAR/PLS OXIMETRY MLT: CPT

## 2023-01-01 PROCEDURE — 84153 ASSAY OF PSA TOTAL: CPT

## 2023-01-01 PROCEDURE — 85014 HEMATOCRIT: CPT

## 2023-01-01 PROCEDURE — 94010 BREATHING CAPACITY TEST: CPT | Mod: PBBFAC | Performed by: INTERNAL MEDICINE

## 2023-01-01 PROCEDURE — 37799 UNLISTED PX VASCULAR SURGERY: CPT

## 2023-01-01 PROCEDURE — 85610 PROTHROMBIN TIME: CPT

## 2023-01-01 PROCEDURE — 85610 PROTHROMBIN TIME: CPT | Performed by: STUDENT IN AN ORGANIZED HEALTH CARE EDUCATION/TRAINING PROGRAM

## 2023-01-01 PROCEDURE — 93320 DOPPLER ECHO COMPLETE: CPT | Mod: 26,,, | Performed by: ANESTHESIOLOGY

## 2023-01-01 PROCEDURE — 85025 COMPLETE CBC W/AUTO DIFF WBC: CPT | Performed by: STUDENT IN AN ORGANIZED HEALTH CARE EDUCATION/TRAINING PROGRAM

## 2023-01-01 PROCEDURE — 93312 TEE: ICD-10-PCS | Mod: 26,59,, | Performed by: ANESTHESIOLOGY

## 2023-01-01 PROCEDURE — C1729 CATH, DRAINAGE: HCPCS | Performed by: THORACIC SURGERY (CARDIOTHORACIC VASCULAR SURGERY)

## 2023-01-01 PROCEDURE — 83605 ASSAY OF LACTIC ACID: CPT

## 2023-01-01 PROCEDURE — 93010 EKG 12-LEAD: ICD-10-PCS | Mod: ,,, | Performed by: INTERNAL MEDICINE

## 2023-01-01 PROCEDURE — 99024 PR POST-OP FOLLOW-UP VISIT: ICD-10-PCS | Mod: POP,,, | Performed by: PHYSICIAN ASSISTANT

## 2023-01-01 PROCEDURE — 82803 BLOOD GASES ANY COMBINATION: CPT

## 2023-01-01 PROCEDURE — 83735 ASSAY OF MAGNESIUM: CPT

## 2023-01-01 PROCEDURE — 84295 ASSAY OF SERUM SODIUM: CPT

## 2023-01-01 PROCEDURE — 93458 L HRT ARTERY/VENTRICLE ANGIO: CPT | Performed by: INTERNAL MEDICINE

## 2023-01-01 PROCEDURE — 92979 ENDOLUMINL IVUS OCT C EA: CPT | Performed by: INTERNAL MEDICINE

## 2023-01-01 PROCEDURE — 36415 COLL VENOUS BLD VENIPUNCTURE: CPT | Performed by: INTERNAL MEDICINE

## 2023-01-01 PROCEDURE — 99223 1ST HOSP IP/OBS HIGH 75: CPT | Mod: ,,, | Performed by: NURSE PRACTITIONER

## 2023-01-01 PROCEDURE — 63600175 PHARM REV CODE 636 W HCPCS: Performed by: PHYSICIAN ASSISTANT

## 2023-01-01 PROCEDURE — 27100026 HC SHUNT SENSOR, TERUMO

## 2023-01-01 PROCEDURE — 99213 OFFICE O/P EST LOW 20 MIN: CPT | Mod: ,,, | Performed by: ORTHOPAEDIC SURGERY

## 2023-01-01 PROCEDURE — C9113 INJ PANTOPRAZOLE SODIUM, VIA: HCPCS | Performed by: ANESTHESIOLOGY

## 2023-01-01 PROCEDURE — 36620 INSERTION CATHETER ARTERY: CPT

## 2023-01-01 PROCEDURE — 25000003 PHARM REV CODE 250: Performed by: NURSE PRACTITIONER

## 2023-01-01 PROCEDURE — 85610 PROTHROMBIN TIME: CPT | Performed by: INTERNAL MEDICINE

## 2023-01-01 PROCEDURE — 94010 BREATHING CAPACITY TEST: ICD-10-PCS | Mod: 26,S$PBB,, | Performed by: INTERNAL MEDICINE

## 2023-01-01 PROCEDURE — 83036 HEMOGLOBIN GLYCOSYLATED A1C: CPT

## 2023-01-01 PROCEDURE — 93010 ELECTROCARDIOGRAM REPORT: CPT | Mod: S$PBB,,, | Performed by: INTERNAL MEDICINE

## 2023-01-01 PROCEDURE — 84100 ASSAY OF PHOSPHORUS: CPT | Performed by: THORACIC SURGERY (CARDIOTHORACIC VASCULAR SURGERY)

## 2023-01-01 PROCEDURE — 27201423 OPTIME MED/SURG SUP & DEVICES STERILE SUPPLY: Performed by: INTERNAL MEDICINE

## 2023-01-01 PROCEDURE — C1769 GUIDE WIRE: HCPCS | Performed by: INTERNAL MEDICINE

## 2023-01-01 PROCEDURE — 36000712 HC OR TIME LEV V 1ST 15 MIN: Performed by: THORACIC SURGERY (CARDIOTHORACIC VASCULAR SURGERY)

## 2023-01-01 PROCEDURE — P9016 RBC LEUKOCYTES REDUCED: HCPCS | Performed by: THORACIC SURGERY (CARDIOTHORACIC VASCULAR SURGERY)

## 2023-01-01 PROCEDURE — 99233 SBSQ HOSP IP/OBS HIGH 50: CPT | Mod: GC,,, | Performed by: INTERNAL MEDICINE

## 2023-01-01 PROCEDURE — 83550 IRON BINDING TEST: CPT

## 2023-01-01 PROCEDURE — 85730 THROMBOPLASTIN TIME PARTIAL: CPT | Performed by: THORACIC SURGERY (CARDIOTHORACIC VASCULAR SURGERY)

## 2023-01-01 PROCEDURE — 80053 COMPREHEN METABOLIC PANEL: CPT

## 2023-01-01 PROCEDURE — 83735 ASSAY OF MAGNESIUM: CPT | Mod: 91 | Performed by: NURSE PRACTITIONER

## 2023-01-01 PROCEDURE — 93325 DOPPLER ECHO COLOR FLOW MAPG: CPT | Mod: 26,,, | Performed by: ANESTHESIOLOGY

## 2023-01-01 PROCEDURE — 94729 PR C02/MEMBANE DIFFUSE CAPACITY: ICD-10-PCS | Mod: 26,S$PBB,, | Performed by: INTERNAL MEDICINE

## 2023-01-01 PROCEDURE — 92978 ENDOLUMINL IVUS OCT C 1ST: CPT | Mod: 26,LD,, | Performed by: INTERNAL MEDICINE

## 2023-01-01 PROCEDURE — 36415 COLL VENOUS BLD VENIPUNCTURE: CPT

## 2023-01-01 PROCEDURE — 36415 COLL VENOUS BLD VENIPUNCTURE: CPT | Performed by: STUDENT IN AN ORGANIZED HEALTH CARE EDUCATION/TRAINING PROGRAM

## 2023-01-01 PROCEDURE — 63600175 PHARM REV CODE 636 W HCPCS: Performed by: ANESTHESIOLOGY

## 2023-01-01 PROCEDURE — 27000191 HC C-V MONITORING

## 2023-01-01 PROCEDURE — 93320 PR DOPPLER ECHO HEART,COMPLETE: ICD-10-PCS | Mod: 26,,, | Performed by: ANESTHESIOLOGY

## 2023-01-01 PROCEDURE — 84443 ASSAY THYROID STIM HORMONE: CPT

## 2023-01-01 PROCEDURE — 99239 PR HOSPITAL DISCHARGE DAY,>30 MIN: ICD-10-PCS | Mod: GC,,, | Performed by: INTERNAL MEDICINE

## 2023-01-01 PROCEDURE — 94640 AIRWAY INHALATION TREATMENT: CPT

## 2023-01-01 PROCEDURE — 80048 BASIC METABOLIC PNL TOTAL CA: CPT | Performed by: STUDENT IN AN ORGANIZED HEALTH CARE EDUCATION/TRAINING PROGRAM

## 2023-01-01 PROCEDURE — 82800 BLOOD PH: CPT

## 2023-01-01 PROCEDURE — 33530 CORONARY ARTERY BYPASS/REOP: CPT | Mod: ,,, | Performed by: THORACIC SURGERY (CARDIOTHORACIC VASCULAR SURGERY)

## 2023-01-01 PROCEDURE — 27100171 HC OXYGEN HIGH FLOW UP TO 24 HOURS

## 2023-01-01 PROCEDURE — 80048 BASIC METABOLIC PNL TOTAL CA: CPT | Mod: XB | Performed by: NURSE PRACTITIONER

## 2023-01-01 PROCEDURE — 86900 BLOOD TYPING SEROLOGIC ABO: CPT | Performed by: THORACIC SURGERY (CARDIOTHORACIC VASCULAR SURGERY)

## 2023-01-01 PROCEDURE — 27000175 HC ADULT BYPASS PUMP

## 2023-01-01 PROCEDURE — 86923 COMPATIBILITY TEST ELECTRIC: CPT | Mod: 91 | Performed by: PHYSICIAN ASSISTANT

## 2023-01-01 PROCEDURE — 99900026 HC AIRWAY MAINTENANCE (STAT)

## 2023-01-01 PROCEDURE — 99239 HOSP IP/OBS DSCHRG MGMT >30: CPT | Mod: GC,,, | Performed by: INTERNAL MEDICINE

## 2023-01-01 PROCEDURE — 25000242 PHARM REV CODE 250 ALT 637 W/ HCPCS

## 2023-01-01 PROCEDURE — 92979 ENDOLUMINL IVUS OCT C EA: CPT | Mod: 26,,, | Performed by: INTERNAL MEDICINE

## 2023-01-01 PROCEDURE — 93010 ELECTROCARDIOGRAM REPORT: CPT | Mod: ,,, | Performed by: INTERNAL MEDICINE

## 2023-01-01 PROCEDURE — 84100 ASSAY OF PHOSPHORUS: CPT | Mod: 91 | Performed by: NURSE PRACTITIONER

## 2023-01-01 PROCEDURE — 27201037 HC PRESSURE MONITORING SET UP

## 2023-01-01 PROCEDURE — P9045 ALBUMIN (HUMAN), 5%, 250 ML: HCPCS | Mod: JZ,JG

## 2023-01-01 PROCEDURE — 99232 PR SUBSEQUENT HOSPITAL CARE,LEVL II: ICD-10-PCS | Mod: ,,, | Performed by: NURSE PRACTITIONER

## 2023-01-01 PROCEDURE — 92978 PR IVUS, CORONARY, 1ST VESSEL: ICD-10-PCS | Mod: 26,LD,, | Performed by: INTERNAL MEDICINE

## 2023-01-01 PROCEDURE — 99024 POSTOP FOLLOW-UP VISIT: CPT | Mod: POP,,, | Performed by: PHYSICIAN ASSISTANT

## 2023-01-01 PROCEDURE — 80048 BASIC METABOLIC PNL TOTAL CA: CPT

## 2023-01-01 PROCEDURE — 99233 PR SUBSEQUENT HOSPITAL CARE,LEVL III: ICD-10-PCS | Mod: GC,,, | Performed by: INTERNAL MEDICINE

## 2023-01-01 PROCEDURE — 99213 PR OFFICE/OUTPT VISIT, EST, LEVL III, 20-29 MIN: ICD-10-PCS | Mod: ,,, | Performed by: ORTHOPAEDIC SURGERY

## 2023-01-01 PROCEDURE — 36430 TRANSFUSION BLD/BLD COMPNT: CPT

## 2023-01-01 PROCEDURE — 25000003 PHARM REV CODE 250: Performed by: ANESTHESIOLOGY

## 2023-01-01 PROCEDURE — 94729 DIFFUSING CAPACITY: CPT | Mod: 26,S$PBB,, | Performed by: INTERNAL MEDICINE

## 2023-01-01 PROCEDURE — 36000713 HC OR TIME LEV V EA ADD 15 MIN: Performed by: THORACIC SURGERY (CARDIOTHORACIC VASCULAR SURGERY)

## 2023-01-01 PROCEDURE — 99214 OFFICE O/P EST MOD 30 MIN: CPT | Mod: PBBFAC,25 | Performed by: THORACIC SURGERY (CARDIOTHORACIC VASCULAR SURGERY)

## 2023-01-01 PROCEDURE — 99999 PR PBB SHADOW E&M-EST. PATIENT-LVL IV: ICD-10-PCS | Mod: PBBFAC,,, | Performed by: THORACIC SURGERY (CARDIOTHORACIC VASCULAR SURGERY)

## 2023-01-01 PROCEDURE — 92978 ENDOLUMINL IVUS OCT C 1ST: CPT | Mod: LD | Performed by: INTERNAL MEDICINE

## 2023-01-01 PROCEDURE — 85730 THROMBOPLASTIN TIME PARTIAL: CPT | Mod: 91 | Performed by: STUDENT IN AN ORGANIZED HEALTH CARE EDUCATION/TRAINING PROGRAM

## 2023-01-01 PROCEDURE — 93010 EKG 12-LEAD: ICD-10-PCS | Mod: ,,, | Performed by: STUDENT IN AN ORGANIZED HEALTH CARE EDUCATION/TRAINING PROGRAM

## 2023-01-01 PROCEDURE — 85730 THROMBOPLASTIN TIME PARTIAL: CPT

## 2023-01-01 PROCEDURE — 99223 PR INITIAL HOSPITAL CARE,LEVL III: ICD-10-PCS | Mod: ,,, | Performed by: PHYSICIAN ASSISTANT

## 2023-01-01 PROCEDURE — 99233 SBSQ HOSP IP/OBS HIGH 50: CPT | Mod: ,,, | Performed by: INTERNAL MEDICINE

## 2023-01-01 PROCEDURE — 85576 BLOOD PLATELET AGGREGATION: CPT | Performed by: PHYSICIAN ASSISTANT

## 2023-01-01 PROCEDURE — 99233 PR SUBSEQUENT HOSPITAL CARE,LEVL III: ICD-10-PCS | Mod: ,,, | Performed by: INTERNAL MEDICINE

## 2023-01-01 PROCEDURE — 63600175 PHARM REV CODE 636 W HCPCS: Performed by: NURSE PRACTITIONER

## 2023-01-01 PROCEDURE — 92950 HEART/LUNG RESUSCITATION CPR: CPT

## 2023-01-01 PROCEDURE — 94003 VENT MGMT INPAT SUBQ DAY: CPT

## 2023-01-01 PROCEDURE — 85730 THROMBOPLASTIN TIME PARTIAL: CPT | Performed by: STUDENT IN AN ORGANIZED HEALTH CARE EDUCATION/TRAINING PROGRAM

## 2023-01-01 PROCEDURE — 20000000 HC ICU ROOM

## 2023-01-01 PROCEDURE — C1894 INTRO/SHEATH, NON-LASER: HCPCS | Performed by: INTERNAL MEDICINE

## 2023-01-01 PROCEDURE — 83735 ASSAY OF MAGNESIUM: CPT | Performed by: STUDENT IN AN ORGANIZED HEALTH CARE EDUCATION/TRAINING PROGRAM

## 2023-01-01 PROCEDURE — 85025 COMPLETE CBC W/AUTO DIFF WBC: CPT | Performed by: THORACIC SURGERY (CARDIOTHORACIC VASCULAR SURGERY)

## 2023-01-01 PROCEDURE — 85384 FIBRINOGEN ACTIVITY: CPT

## 2023-01-01 PROCEDURE — 33530 PR CABG, REOPERATE >1 MON ORIG: ICD-10-PCS | Mod: ,,, | Performed by: THORACIC SURGERY (CARDIOTHORACIC VASCULAR SURGERY)

## 2023-01-01 PROCEDURE — 94799 UNLISTED PULMONARY SVC/PX: CPT

## 2023-01-01 PROCEDURE — 94010 BREATHING CAPACITY TEST: CPT

## 2023-01-01 PROCEDURE — 63600175 PHARM REV CODE 636 W HCPCS: Performed by: STUDENT IN AN ORGANIZED HEALTH CARE EDUCATION/TRAINING PROGRAM

## 2023-01-01 PROCEDURE — 94002 VENT MGMT INPAT INIT DAY: CPT

## 2023-01-01 PROCEDURE — 93010 ELECTROCARDIOGRAM REPORT: CPT | Mod: ,,, | Performed by: STUDENT IN AN ORGANIZED HEALTH CARE EDUCATION/TRAINING PROGRAM

## 2023-01-01 PROCEDURE — 27100088 HC CELL SAVER

## 2023-01-01 PROCEDURE — 82330 ASSAY OF CALCIUM: CPT

## 2023-01-01 PROCEDURE — 25000003 PHARM REV CODE 250: Performed by: THORACIC SURGERY (CARDIOTHORACIC VASCULAR SURGERY)

## 2023-01-01 PROCEDURE — 63600175 PHARM REV CODE 636 W HCPCS: Mod: JZ,JG

## 2023-01-01 PROCEDURE — 83735 ASSAY OF MAGNESIUM: CPT | Performed by: NURSE PRACTITIONER

## 2023-01-01 PROCEDURE — 93458 PR CATH PLACE/CORON ANGIO, IMG SUPER/INTERP,W LEFT HEART VENTRICULOGRAPHY: ICD-10-PCS | Mod: 26,,, | Performed by: INTERNAL MEDICINE

## 2023-01-01 PROCEDURE — 71046 X-RAY EXAM CHEST 2 VIEWS: CPT | Mod: TC,FY

## 2023-01-01 PROCEDURE — 83735 ASSAY OF MAGNESIUM: CPT | Performed by: THORACIC SURGERY (CARDIOTHORACIC VASCULAR SURGERY)

## 2023-01-01 PROCEDURE — 80053 COMPREHEN METABOLIC PANEL: CPT | Performed by: THORACIC SURGERY (CARDIOTHORACIC VASCULAR SURGERY)

## 2023-01-01 PROCEDURE — 99204 OFFICE O/P NEW MOD 45 MIN: CPT | Mod: 25,,, | Performed by: ORTHOPAEDIC SURGERY

## 2023-01-01 PROCEDURE — 33508 PR ENDOSCOPY W/VIDEO-ASST VEIN HARVEST,CABG: ICD-10-PCS | Mod: 59,,, | Performed by: THORACIC SURGERY (CARDIOTHORACIC VASCULAR SURGERY)

## 2023-01-01 PROCEDURE — 64999 UNLISTED PX NERVOUS SYSTEM: CPT | Mod: ,,, | Performed by: ANESTHESIOLOGY

## 2023-01-01 PROCEDURE — 83880 ASSAY OF NATRIURETIC PEPTIDE: CPT

## 2023-01-01 PROCEDURE — 27202608 HC CANNULA, MISC

## 2023-01-01 PROCEDURE — 86900 BLOOD TYPING SEROLOGIC ABO: CPT | Performed by: INTERNAL MEDICINE

## 2023-01-01 PROCEDURE — 37000008 HC ANESTHESIA 1ST 15 MINUTES: Performed by: THORACIC SURGERY (CARDIOTHORACIC VASCULAR SURGERY)

## 2023-01-01 PROCEDURE — 80053 COMPREHEN METABOLIC PANEL: CPT | Mod: 91 | Performed by: NURSE PRACTITIONER

## 2023-01-01 PROCEDURE — 27000221 HC OXYGEN, UP TO 24 HOURS

## 2023-01-01 PROCEDURE — C1887 CATHETER, GUIDING: HCPCS | Performed by: INTERNAL MEDICINE

## 2023-01-01 PROCEDURE — 33533 PR CABG, ARTERIAL, SINGLE: ICD-10-PCS | Mod: ,,, | Performed by: THORACIC SURGERY (CARDIOTHORACIC VASCULAR SURGERY)

## 2023-01-01 PROCEDURE — 33533 CABG ARTERIAL SINGLE: CPT | Mod: ,,, | Performed by: THORACIC SURGERY (CARDIOTHORACIC VASCULAR SURGERY)

## 2023-01-01 PROCEDURE — 63600175 PHARM REV CODE 636 W HCPCS: Performed by: THORACIC SURGERY (CARDIOTHORACIC VASCULAR SURGERY)

## 2023-01-01 PROCEDURE — 99232 SBSQ HOSP IP/OBS MODERATE 35: CPT | Mod: ,,, | Performed by: NURSE PRACTITIONER

## 2023-01-01 PROCEDURE — 93005 ELECTROCARDIOGRAM TRACING: CPT | Mod: PBBFAC | Performed by: INTERNAL MEDICINE

## 2023-01-01 PROCEDURE — 85347 COAGULATION TIME ACTIVATED: CPT

## 2023-01-01 PROCEDURE — 93325 PR DOPPLER COLOR FLOW VELOCITY MAP: ICD-10-PCS | Mod: 26,,, | Performed by: ANESTHESIOLOGY

## 2023-01-01 PROCEDURE — 99152 MOD SED SAME PHYS/QHP 5/>YRS: CPT | Mod: ,,, | Performed by: INTERNAL MEDICINE

## 2023-01-01 PROCEDURE — 99223 1ST HOSP IP/OBS HIGH 75: CPT | Mod: ,,, | Performed by: PHYSICIAN ASSISTANT

## 2023-01-01 PROCEDURE — 71046 XR CHEST PA AND LATERAL PRE-OP: ICD-10-PCS | Mod: 26,,, | Performed by: RADIOLOGY

## 2023-01-01 PROCEDURE — 25500020 PHARM REV CODE 255: Performed by: INTERNAL MEDICINE

## 2023-01-01 PROCEDURE — 92960 CARDIOVERSION ELECTRIC EXT: CPT

## 2023-01-01 PROCEDURE — 71046 X-RAY EXAM CHEST 2 VIEWS: CPT | Mod: 26,,, | Performed by: RADIOLOGY

## 2023-01-01 PROCEDURE — 20610 DRAIN/INJ JOINT/BURSA W/O US: CPT | Mod: RT,,, | Performed by: ORTHOPAEDIC SURGERY

## 2023-01-01 PROCEDURE — 86900 BLOOD TYPING SEROLOGIC ABO: CPT

## 2023-01-01 PROCEDURE — 27201423 OPTIME MED/SURG SUP & DEVICES STERILE SUPPLY: Performed by: THORACIC SURGERY (CARDIOTHORACIC VASCULAR SURGERY)

## 2023-01-01 PROCEDURE — 84100 ASSAY OF PHOSPHORUS: CPT

## 2023-01-01 PROCEDURE — 85520 HEPARIN ASSAY: CPT

## 2023-01-01 PROCEDURE — 33517 CABG ARTERY-VEIN SINGLE: CPT | Mod: ,,, | Performed by: THORACIC SURGERY (CARDIOTHORACIC VASCULAR SURGERY)

## 2023-01-01 PROCEDURE — C1760 CLOSURE DEV, VASC: HCPCS | Performed by: INTERNAL MEDICINE

## 2023-01-01 PROCEDURE — 63600150 PHARM REV CODE 636

## 2023-01-01 PROCEDURE — P9045 ALBUMIN (HUMAN), 5%, 250 ML: HCPCS | Mod: JG

## 2023-01-01 PROCEDURE — 33517 PR CABG, ARTERY-VEIN, SINGLE: ICD-10-PCS | Mod: ,,, | Performed by: THORACIC SURGERY (CARDIOTHORACIC VASCULAR SURGERY)

## 2023-01-01 PROCEDURE — 84100 ASSAY OF PHOSPHORUS: CPT | Performed by: STUDENT IN AN ORGANIZED HEALTH CARE EDUCATION/TRAINING PROGRAM

## 2023-01-01 PROCEDURE — 73700 CT LOWER EXTREMITY W/O DYE: CPT | Mod: TC,RT

## 2023-01-01 PROCEDURE — 80048 BASIC METABOLIC PNL TOTAL CA: CPT | Performed by: INTERNAL MEDICINE

## 2023-01-01 PROCEDURE — 80061 LIPID PANEL: CPT

## 2023-01-01 PROCEDURE — D9220A PRA ANESTHESIA: ICD-10-PCS | Mod: ,,, | Performed by: ANESTHESIOLOGY

## 2023-01-01 PROCEDURE — 83735 ASSAY OF MAGNESIUM: CPT | Mod: 91 | Performed by: THORACIC SURGERY (CARDIOTHORACIC VASCULAR SURGERY)

## 2023-01-01 PROCEDURE — 93041 RHYTHM ECG TRACING: CPT

## 2023-01-01 PROCEDURE — 85730 THROMBOPLASTIN TIME PARTIAL: CPT | Mod: 91

## 2023-01-01 PROCEDURE — 33508 ENDOSCOPIC VEIN HARVEST: CPT | Mod: 59,,, | Performed by: THORACIC SURGERY (CARDIOTHORACIC VASCULAR SURGERY)

## 2023-01-01 PROCEDURE — C1753 CATH, INTRAVAS ULTRASOUND: HCPCS | Performed by: INTERNAL MEDICINE

## 2023-01-01 PROCEDURE — 99152 MOD SED SAME PHYS/QHP 5/>YRS: CPT | Performed by: INTERNAL MEDICINE

## 2023-01-01 PROCEDURE — 94010 BREATHING CAPACITY TEST: CPT | Mod: 26,S$PBB,, | Performed by: INTERNAL MEDICINE

## 2023-01-01 PROCEDURE — 85347 COAGULATION TIME ACTIVATED: CPT | Performed by: INTERNAL MEDICINE

## 2023-01-01 PROCEDURE — 93458 L HRT ARTERY/VENTRICLE ANGIO: CPT | Mod: 26,,, | Performed by: INTERNAL MEDICINE

## 2023-01-01 PROCEDURE — 99152 PR MOD CONSCIOUS SEDATION, SAME PHYS, 5+ YRS, FIRST 15 MIN: ICD-10-PCS | Mod: ,,, | Performed by: INTERNAL MEDICINE

## 2023-01-01 PROCEDURE — 93010 EKG 12-LEAD: ICD-10-PCS | Mod: S$PBB,,, | Performed by: INTERNAL MEDICINE

## 2023-01-01 PROCEDURE — 94150 VITAL CAPACITY TEST: CPT

## 2023-01-01 PROCEDURE — 84436 ASSAY OF TOTAL THYROXINE: CPT

## 2023-01-01 PROCEDURE — 86920 COMPATIBILITY TEST SPIN: CPT | Performed by: THORACIC SURGERY (CARDIOTHORACIC VASCULAR SURGERY)

## 2023-01-01 PROCEDURE — 20610 LARGE JOINT ASPIRATION/INJECTION: R KNEE: ICD-10-PCS | Mod: RT,,, | Performed by: ORTHOPAEDIC SURGERY

## 2023-01-01 PROCEDURE — 99204 PR OFFICE/OUTPT VISIT, NEW, LEVL IV, 45-59 MIN: ICD-10-PCS | Mod: 25,,, | Performed by: ORTHOPAEDIC SURGERY

## 2023-01-01 PROCEDURE — 64488 TAP BLOCK BI INJECTION: CPT | Mod: 59,,, | Performed by: ANESTHESIOLOGY

## 2023-01-01 PROCEDURE — 94729 DIFFUSING CAPACITY: CPT | Mod: PBBFAC | Performed by: INTERNAL MEDICINE

## 2023-01-01 PROCEDURE — 99223 PR INITIAL HOSPITAL CARE,LEVL III: ICD-10-PCS | Mod: AI,GC,, | Performed by: INTERNAL MEDICINE

## 2023-01-01 PROCEDURE — 92979 PR INTRAVASC CORONARY SO2,ADDN VESSEL: ICD-10-PCS | Mod: 26,,, | Performed by: INTERNAL MEDICINE

## 2023-01-01 PROCEDURE — 82565 ASSAY OF CREATININE: CPT

## 2023-01-01 PROCEDURE — 93312 ECHO TRANSESOPHAGEAL: CPT | Mod: 26,59,, | Performed by: ANESTHESIOLOGY

## 2023-01-01 PROCEDURE — 99499 NO LOS: ICD-10-PCS | Mod: S$PBB,,, | Performed by: THORACIC SURGERY (CARDIOTHORACIC VASCULAR SURGERY)

## 2023-01-01 PROCEDURE — 99900017 HC EXTUBATION W/PARAMETERS (STAT)

## 2023-01-01 PROCEDURE — 80076 HEPATIC FUNCTION PANEL: CPT

## 2023-01-01 PROCEDURE — 37000009 HC ANESTHESIA EA ADD 15 MINS: Performed by: THORACIC SURGERY (CARDIOTHORACIC VASCULAR SURGERY)

## 2023-01-01 PROCEDURE — 27200953 HC CARDIOPLEGIA SYSTEM

## 2023-01-01 PROCEDURE — 99223 1ST HOSP IP/OBS HIGH 75: CPT | Mod: AI,GC,, | Performed by: INTERNAL MEDICINE

## 2023-01-01 PROCEDURE — 85027 COMPLETE CBC AUTOMATED: CPT

## 2023-01-01 PROCEDURE — 85025 COMPLETE CBC W/AUTO DIFF WBC: CPT | Mod: 91

## 2023-01-01 RX ORDER — POTASSIUM CHLORIDE 29.8 MG/ML
40 INJECTION INTRAVENOUS
Status: DISCONTINUED | OUTPATIENT
Start: 2023-01-01 | End: 2023-09-02 | Stop reason: HOSPADM

## 2023-01-01 RX ORDER — SODIUM CHLORIDE 9 MG/ML
INJECTION, SOLUTION INTRAVENOUS ONCE
Status: DISCONTINUED | OUTPATIENT
Start: 2023-01-01 | End: 2023-01-01 | Stop reason: SDUPTHER

## 2023-01-01 RX ORDER — DIPHENHYDRAMINE HCL 50 MG
50 CAPSULE ORAL ONCE
Status: COMPLETED | OUTPATIENT
Start: 2023-01-01 | End: 2023-01-01

## 2023-01-01 RX ORDER — MUPIROCIN 20 MG/G
1 OINTMENT TOPICAL 2 TIMES DAILY
Status: CANCELLED | OUTPATIENT
Start: 2023-01-01 | End: 2023-09-03

## 2023-01-01 RX ORDER — ONDANSETRON 2 MG/ML
4 INJECTION INTRAMUSCULAR; INTRAVENOUS EVERY 12 HOURS PRN
Status: DISCONTINUED | OUTPATIENT
Start: 2023-01-01 | End: 2023-09-02 | Stop reason: HOSPADM

## 2023-01-01 RX ORDER — PROPOFOL 10 MG/ML
0-50 INJECTION, EMULSION INTRAVENOUS CONTINUOUS
Status: DISCONTINUED | OUTPATIENT
Start: 2023-01-01 | End: 2023-01-01

## 2023-01-01 RX ORDER — LANOLIN ALCOHOL/MO/W.PET/CERES
1 CREAM (GRAM) TOPICAL DAILY
Status: DISCONTINUED | OUTPATIENT
Start: 2023-01-01 | End: 2023-01-01 | Stop reason: HOSPADM

## 2023-01-01 RX ORDER — CEFAZOLIN SODIUM 1 G/3ML
INJECTION, POWDER, FOR SOLUTION INTRAMUSCULAR; INTRAVENOUS
Status: DISCONTINUED | OUTPATIENT
Start: 2023-01-01 | End: 2023-01-01

## 2023-01-01 RX ORDER — FAMOTIDINE 10 MG/ML
20 INJECTION INTRAVENOUS 2 TIMES DAILY
Status: DISCONTINUED | OUTPATIENT
Start: 2023-01-01 | End: 2023-01-01

## 2023-01-01 RX ORDER — IBUPROFEN 200 MG
16 TABLET ORAL
Status: DISCONTINUED | OUTPATIENT
Start: 2023-01-01 | End: 2023-01-01

## 2023-01-01 RX ORDER — SODIUM CHLORIDE 9 MG/ML
INJECTION, SOLUTION INTRAVENOUS CONTINUOUS
Status: DISCONTINUED | OUTPATIENT
Start: 2023-01-01 | End: 2023-09-02 | Stop reason: HOSPADM

## 2023-01-01 RX ORDER — PROPOFOL 10 MG/ML
40 VIAL (ML) INTRAVENOUS ONCE
Status: DISCONTINUED | OUTPATIENT
Start: 2023-01-01 | End: 2023-09-02 | Stop reason: HOSPADM

## 2023-01-01 RX ORDER — PROTAMINE SULFATE 10 MG/ML
INJECTION, SOLUTION INTRAVENOUS
Status: DISCONTINUED | OUTPATIENT
Start: 2023-01-01 | End: 2023-01-01

## 2023-01-01 RX ORDER — POTASSIUM CHLORIDE 14.9 MG/ML
60 INJECTION INTRAVENOUS
Status: DISCONTINUED | OUTPATIENT
Start: 2023-01-01 | End: 2023-09-02 | Stop reason: HOSPADM

## 2023-01-01 RX ORDER — MAGNESIUM SULFATE HEPTAHYDRATE 40 MG/ML
2 INJECTION, SOLUTION INTRAVENOUS
Status: DISCONTINUED | OUTPATIENT
Start: 2023-01-01 | End: 2023-09-02 | Stop reason: HOSPADM

## 2023-01-01 RX ORDER — PHENYLEPHRINE HYDROCHLORIDE 10 MG/ML
INJECTION INTRAVENOUS
Status: DISCONTINUED
Start: 2023-01-01 | End: 2023-01-01 | Stop reason: WASHOUT

## 2023-01-01 RX ORDER — FUROSEMIDE 10 MG/ML
INJECTION INTRAMUSCULAR; INTRAVENOUS
Status: DISCONTINUED | OUTPATIENT
Start: 2023-01-01 | End: 2023-01-01 | Stop reason: HOSPADM

## 2023-01-01 RX ORDER — CLOPIDOGREL 300 MG/1
600 TABLET, FILM COATED ORAL ONCE
Status: COMPLETED | OUTPATIENT
Start: 2023-01-01 | End: 2023-01-01

## 2023-01-01 RX ORDER — HYDROCODONE BITARTRATE AND ACETAMINOPHEN 500; 5 MG/1; MG/1
TABLET ORAL
Status: DISCONTINUED | OUTPATIENT
Start: 2023-01-01 | End: 2023-09-02 | Stop reason: HOSPADM

## 2023-01-01 RX ORDER — BUPIVACAINE HYDROCHLORIDE 7.5 MG/ML
INJECTION, SOLUTION EPIDURAL; RETROBULBAR
Status: DISCONTINUED | OUTPATIENT
Start: 2023-01-01 | End: 2023-01-01

## 2023-01-01 RX ORDER — GLUCAGON 1 MG
1 KIT INJECTION
Status: DISCONTINUED | OUTPATIENT
Start: 2023-01-01 | End: 2023-09-02 | Stop reason: HOSPADM

## 2023-01-01 RX ORDER — ALBUTEROL SULFATE 2.5 MG/.5ML
2.5 SOLUTION RESPIRATORY (INHALATION) EVERY 4 HOURS PRN
Status: DISCONTINUED | OUTPATIENT
Start: 2023-01-01 | End: 2023-01-01

## 2023-01-01 RX ORDER — GABAPENTIN 400 MG/1
400 CAPSULE ORAL 3 TIMES DAILY
COMMUNITY
Start: 2023-01-01

## 2023-01-01 RX ORDER — AMLODIPINE BESYLATE 5 MG/1
5 TABLET ORAL
Status: ON HOLD | COMMUNITY
Start: 2023-01-01 | End: 2023-01-01

## 2023-01-01 RX ORDER — MAGNESIUM SULFATE/D5W 2 G/50 ML
4 INTRAVENOUS SOLUTION, PIGGYBACK (ML) INTRAVENOUS
Status: CANCELLED | OUTPATIENT
Start: 2023-01-01

## 2023-01-01 RX ORDER — VALSARTAN 40 MG/1
40 TABLET ORAL 2 TIMES DAILY
Qty: 60 TABLET | Refills: 5 | Status: CANCELLED | OUTPATIENT
Start: 2023-01-01 | End: 2024-07-31

## 2023-01-01 RX ORDER — BETAMETHASONE SODIUM PHOSPHATE AND BETAMETHASONE ACETATE 3; 3 MG/ML; MG/ML
12 INJECTION, SUSPENSION INTRA-ARTICULAR; INTRALESIONAL; INTRAMUSCULAR; SOFT TISSUE
Status: DISCONTINUED | OUTPATIENT
Start: 2023-01-01 | End: 2023-01-01 | Stop reason: HOSPADM

## 2023-01-01 RX ORDER — ACETAMINOPHEN 325 MG/1
650 TABLET ORAL EVERY 4 HOURS PRN
Status: DISCONTINUED | OUTPATIENT
Start: 2023-01-01 | End: 2023-01-01

## 2023-01-01 RX ORDER — ALBUMIN HUMAN 50 G/1000ML
25 SOLUTION INTRAVENOUS ONCE
Status: DISCONTINUED | OUTPATIENT
Start: 2023-01-01 | End: 2023-01-01

## 2023-01-01 RX ORDER — GLUCAGON 1 MG
1 KIT INJECTION
Status: DISCONTINUED | OUTPATIENT
Start: 2023-01-01 | End: 2023-01-01 | Stop reason: HOSPADM

## 2023-01-01 RX ORDER — DOCUSATE SODIUM 100 MG/1
100 CAPSULE, LIQUID FILLED ORAL 2 TIMES DAILY
Status: CANCELLED | OUTPATIENT
Start: 2023-01-01

## 2023-01-01 RX ORDER — NOREPINEPHRINE BITARTRATE/D5W 4MG/250ML
0-3 PLASTIC BAG, INJECTION (ML) INTRAVENOUS CONTINUOUS
Status: DISCONTINUED | OUTPATIENT
Start: 2023-01-01 | End: 2023-09-02 | Stop reason: HOSPADM

## 2023-01-01 RX ORDER — DOCUSATE SODIUM 100 MG/1
100 CAPSULE, LIQUID FILLED ORAL 2 TIMES DAILY
Status: DISCONTINUED | OUTPATIENT
Start: 2023-01-01 | End: 2023-09-02 | Stop reason: HOSPADM

## 2023-01-01 RX ORDER — OXYCODONE HYDROCHLORIDE 10 MG/1
10 TABLET ORAL EVERY 4 HOURS PRN
Status: CANCELLED | OUTPATIENT
Start: 2023-01-01

## 2023-01-01 RX ORDER — ASPIRIN 325 MG
325 TABLET ORAL ONCE
Status: COMPLETED | OUTPATIENT
Start: 2023-01-01 | End: 2023-01-01

## 2023-01-01 RX ORDER — BISACODYL 10 MG
10 SUPPOSITORY, RECTAL RECTAL DAILY PRN
Status: DISCONTINUED | OUTPATIENT
Start: 2023-01-01 | End: 2023-09-02 | Stop reason: HOSPADM

## 2023-01-01 RX ORDER — NAPROXEN SODIUM 220 MG/1
81 TABLET, FILM COATED ORAL DAILY
Status: DISCONTINUED | OUTPATIENT
Start: 2023-01-01 | End: 2023-01-01 | Stop reason: HOSPADM

## 2023-01-01 RX ORDER — TRANEXAMIC ACID 100 MG/ML
INJECTION, SOLUTION INTRAVENOUS CONTINUOUS PRN
Status: DISCONTINUED | OUTPATIENT
Start: 2023-01-01 | End: 2023-01-01

## 2023-01-01 RX ORDER — IBUPROFEN 200 MG
16 TABLET ORAL
Status: DISCONTINUED | OUTPATIENT
Start: 2023-01-01 | End: 2023-01-01 | Stop reason: HOSPADM

## 2023-01-01 RX ORDER — PHENYLEPHRINE HCL IN 0.9% NACL 1 MG/10 ML
600 SYRINGE (ML) INTRAVENOUS ONCE
Status: COMPLETED | OUTPATIENT
Start: 2023-01-01 | End: 2023-01-01

## 2023-01-01 RX ORDER — NITROGLYCERIN 5 MG/ML
INJECTION, SOLUTION INTRAVENOUS
Status: DISCONTINUED | OUTPATIENT
Start: 2023-01-01 | End: 2023-01-01

## 2023-01-01 RX ORDER — LIDOCAINE HYDROCHLORIDE 10 MG/ML
INJECTION INFILTRATION; PERINEURAL
Status: DISCONTINUED | OUTPATIENT
Start: 2023-01-01 | End: 2023-01-01 | Stop reason: HOSPADM

## 2023-01-01 RX ORDER — NITROGLYCERIN 0.4 MG/1
0.4 TABLET SUBLINGUAL EVERY 5 MIN PRN
Status: DISCONTINUED | OUTPATIENT
Start: 2023-01-01 | End: 2023-01-01 | Stop reason: HOSPADM

## 2023-01-01 RX ORDER — PHENYLEPHRINE HYDROCHLORIDE 10 MG/ML
INJECTION INTRAVENOUS
Status: DISCONTINUED | OUTPATIENT
Start: 2023-01-01 | End: 2023-01-01

## 2023-01-01 RX ORDER — METFORMIN HYDROCHLORIDE 500 MG/1
1000 TABLET ORAL 2 TIMES DAILY WITH MEALS
Start: 2023-01-01

## 2023-01-01 RX ORDER — PHENYLEPHRINE HCL IN 0.9% NACL 1 MG/10 ML
SYRINGE (ML) INTRAVENOUS
Status: COMPLETED
Start: 2023-01-01 | End: 2023-01-01

## 2023-01-01 RX ORDER — NAPROXEN SODIUM 220 MG/1
81 TABLET, FILM COATED ORAL DAILY
Qty: 30 TABLET | Refills: 11 | Status: SHIPPED | OUTPATIENT
Start: 2023-01-01 | End: 2024-08-01

## 2023-01-01 RX ORDER — PROPOFOL 10 MG/ML
INJECTION, EMULSION INTRAVENOUS
Status: COMPLETED
Start: 2023-01-01 | End: 2023-01-01

## 2023-01-01 RX ORDER — NAPROXEN SODIUM 220 MG/1
81 TABLET, FILM COATED ORAL DAILY
Status: CANCELLED | OUTPATIENT
Start: 2023-01-01

## 2023-01-01 RX ORDER — MIDAZOLAM HYDROCHLORIDE 1 MG/ML
INJECTION INTRAMUSCULAR; INTRAVENOUS
Status: DISCONTINUED | OUTPATIENT
Start: 2023-01-01 | End: 2023-01-01

## 2023-01-01 RX ORDER — ACETAMINOPHEN 325 MG/1
650 TABLET ORAL EVERY 4 HOURS PRN
Status: DISCONTINUED | OUTPATIENT
Start: 2023-01-01 | End: 2023-01-01 | Stop reason: HOSPADM

## 2023-01-01 RX ORDER — FUROSEMIDE 10 MG/ML
20 INJECTION INTRAMUSCULAR; INTRAVENOUS ONCE
Status: COMPLETED | OUTPATIENT
Start: 2023-01-01 | End: 2023-01-01

## 2023-01-01 RX ORDER — HEPARIN SODIUM,PORCINE/D5W 25000/250
0-40 INTRAVENOUS SOLUTION INTRAVENOUS CONTINUOUS
Status: DISCONTINUED | OUTPATIENT
Start: 2023-01-01 | End: 2023-01-01

## 2023-01-01 RX ORDER — MAGNESIUM SULFATE HEPTAHYDRATE 40 MG/ML
2 INJECTION, SOLUTION INTRAVENOUS
Status: CANCELLED | OUTPATIENT
Start: 2023-01-01

## 2023-01-01 RX ORDER — SODIUM CHLORIDE 9 MG/ML
INJECTION, SOLUTION INTRAVENOUS CONTINUOUS
Status: DISCONTINUED | OUTPATIENT
Start: 2023-01-01 | End: 2023-01-01

## 2023-01-01 RX ORDER — PHENYLEPHRINE HCL IN 0.9% NACL 1 MG/10 ML
1000 SYRINGE (ML) INTRAVENOUS ONCE
Status: COMPLETED | OUTPATIENT
Start: 2023-01-01 | End: 2023-01-01

## 2023-01-01 RX ORDER — HEPARIN SOD,PORCINE/0.9 % NACL 1000/500ML
INTRAVENOUS SOLUTION INTRAVENOUS
Status: DISCONTINUED | OUTPATIENT
Start: 2023-01-01 | End: 2023-01-01 | Stop reason: HOSPADM

## 2023-01-01 RX ORDER — MUPIROCIN 20 MG/G
1 OINTMENT TOPICAL
Status: CANCELLED | OUTPATIENT
Start: 2023-01-01

## 2023-01-01 RX ORDER — IPRATROPIUM BROMIDE AND ALBUTEROL SULFATE 2.5; .5 MG/3ML; MG/3ML
3 SOLUTION RESPIRATORY (INHALATION) EVERY 4 HOURS PRN
Status: ACTIVE | OUTPATIENT
Start: 2023-01-01 | End: 2023-01-01

## 2023-01-01 RX ORDER — OXYCODONE HYDROCHLORIDE 5 MG/1
5 TABLET ORAL EVERY 4 HOURS PRN
Status: CANCELLED | OUTPATIENT
Start: 2023-01-01

## 2023-01-01 RX ORDER — ACETAMINOPHEN 500 MG
1000 TABLET ORAL EVERY 8 HOURS
Status: DISCONTINUED | OUTPATIENT
Start: 2023-01-01 | End: 2023-01-01

## 2023-01-01 RX ORDER — ALBUMIN HUMAN 50 G/1000ML
SOLUTION INTRAVENOUS
Status: COMPLETED
Start: 2023-01-01 | End: 2023-01-01

## 2023-01-01 RX ORDER — METOCLOPRAMIDE HYDROCHLORIDE 5 MG/ML
5 INJECTION INTRAMUSCULAR; INTRAVENOUS EVERY 6 HOURS PRN
Status: DISCONTINUED | OUTPATIENT
Start: 2023-01-01 | End: 2023-09-02 | Stop reason: HOSPADM

## 2023-01-01 RX ORDER — INDOMETHACIN 25 MG/1
CAPSULE ORAL
Status: DISPENSED
Start: 2023-01-01 | End: 2023-01-01

## 2023-01-01 RX ORDER — CLOPIDOGREL BISULFATE 75 MG/1
75 TABLET ORAL DAILY
Status: DISCONTINUED | OUTPATIENT
Start: 2023-01-01 | End: 2023-01-01 | Stop reason: HOSPADM

## 2023-01-01 RX ORDER — SUCCINYLCHOLINE CHLORIDE 20 MG/ML
INJECTION INTRAMUSCULAR; INTRAVENOUS
Status: DISCONTINUED
Start: 2023-01-01 | End: 2023-09-02 | Stop reason: HOSPADM

## 2023-01-01 RX ORDER — METFORMIN HYDROCHLORIDE 500 MG/1
1000 TABLET ORAL 2 TIMES DAILY WITH MEALS
Status: ON HOLD | COMMUNITY
Start: 2023-01-01 | End: 2023-01-01 | Stop reason: SDUPTHER

## 2023-01-01 RX ORDER — SODIUM CHLORIDE 0.9 % (FLUSH) 0.9 %
10 SYRINGE (ML) INJECTION
Status: DISCONTINUED | OUTPATIENT
Start: 2023-01-01 | End: 2023-09-02 | Stop reason: HOSPADM

## 2023-01-01 RX ORDER — ONDANSETRON 8 MG/1
8 TABLET, ORALLY DISINTEGRATING ORAL EVERY 8 HOURS PRN
Status: DISCONTINUED | OUTPATIENT
Start: 2023-01-01 | End: 2023-01-01 | Stop reason: HOSPADM

## 2023-01-01 RX ORDER — METOPROLOL TARTRATE 25 MG/1
25 TABLET, FILM COATED ORAL 2 TIMES DAILY
Status: CANCELLED | OUTPATIENT
Start: 2023-01-01

## 2023-01-01 RX ORDER — PANTOPRAZOLE SODIUM 40 MG/10ML
40 INJECTION, POWDER, LYOPHILIZED, FOR SOLUTION INTRAVENOUS DAILY
Status: DISCONTINUED | OUTPATIENT
Start: 2023-01-01 | End: 2023-01-01

## 2023-01-01 RX ORDER — NOREPINEPHRINE BITARTRATE/D5W 4MG/250ML
0-3 PLASTIC BAG, INJECTION (ML) INTRAVENOUS CONTINUOUS
Status: DISCONTINUED | OUTPATIENT
Start: 2023-01-01 | End: 2023-01-01

## 2023-01-01 RX ORDER — METOPROLOL SUCCINATE 25 MG/1
25 TABLET, EXTENDED RELEASE ORAL DAILY
Qty: 30 TABLET | Refills: 5 | Status: SHIPPED | OUTPATIENT
Start: 2023-01-01 | End: 2024-08-01

## 2023-01-01 RX ORDER — FUROSEMIDE 40 MG/1
40 TABLET ORAL DAILY
Status: DISCONTINUED | OUTPATIENT
Start: 2023-01-01 | End: 2023-01-01 | Stop reason: HOSPADM

## 2023-01-01 RX ORDER — OXYCODONE HYDROCHLORIDE 10 MG/1
10 TABLET ORAL EVERY 4 HOURS PRN
Status: DISCONTINUED | OUTPATIENT
Start: 2023-01-01 | End: 2023-09-02 | Stop reason: HOSPADM

## 2023-01-01 RX ORDER — POTASSIUM CHLORIDE 14.9 MG/ML
20 INJECTION INTRAVENOUS
Status: CANCELLED | OUTPATIENT
Start: 2023-01-01

## 2023-01-01 RX ORDER — IPRATROPIUM BROMIDE AND ALBUTEROL SULFATE 2.5; .5 MG/3ML; MG/3ML
3 SOLUTION RESPIRATORY (INHALATION) EVERY 4 HOURS PRN
Status: CANCELLED | OUTPATIENT
Start: 2023-01-01 | End: 2023-01-01

## 2023-01-01 RX ORDER — FAMOTIDINE 10 MG/ML
20 INJECTION INTRAVENOUS 2 TIMES DAILY
Status: CANCELLED | OUTPATIENT
Start: 2023-01-01

## 2023-01-01 RX ORDER — DEXTROSE MONOHYDRATE, SODIUM CHLORIDE, AND POTASSIUM CHLORIDE 50; 1.49; 4.5 G/1000ML; G/1000ML; G/1000ML
INJECTION, SOLUTION INTRAVENOUS CONTINUOUS
Status: DISCONTINUED | OUTPATIENT
Start: 2023-01-01 | End: 2023-01-01

## 2023-01-01 RX ORDER — FUROSEMIDE 10 MG/ML
40 INJECTION INTRAMUSCULAR; INTRAVENOUS ONCE
Status: COMPLETED | OUTPATIENT
Start: 2023-01-01 | End: 2023-01-01

## 2023-01-01 RX ORDER — ASPIRIN 81 MG/1
81 TABLET ORAL DAILY
Status: DISCONTINUED | OUTPATIENT
Start: 2023-01-01 | End: 2023-01-01 | Stop reason: HOSPADM

## 2023-01-01 RX ORDER — ETOMIDATE 2 MG/ML
INJECTION INTRAVENOUS
Status: DISCONTINUED | OUTPATIENT
Start: 2023-01-01 | End: 2023-01-01

## 2023-01-01 RX ORDER — ALBUMIN HUMAN 50 G/1000ML
25 SOLUTION INTRAVENOUS ONCE
Status: COMPLETED | OUTPATIENT
Start: 2023-01-01 | End: 2023-01-01

## 2023-01-01 RX ORDER — ETOMIDATE 2 MG/ML
INJECTION INTRAVENOUS
Status: DISCONTINUED
Start: 2023-01-01 | End: 2023-09-02 | Stop reason: HOSPADM

## 2023-01-01 RX ORDER — SODIUM CHLORIDE 0.9 % (FLUSH) 0.9 %
10 SYRINGE (ML) INJECTION
Status: DISCONTINUED | OUTPATIENT
Start: 2023-01-01 | End: 2023-01-01 | Stop reason: HOSPADM

## 2023-01-01 RX ORDER — CEFAZOLIN SODIUM 2 G/50ML
2 SOLUTION INTRAVENOUS
Status: CANCELLED | OUTPATIENT
Start: 2023-01-01 | End: 2023-01-01

## 2023-01-01 RX ORDER — INSULIN ASPART 100 [IU]/ML
0-5 INJECTION, SOLUTION INTRAVENOUS; SUBCUTANEOUS
Status: DISCONTINUED | OUTPATIENT
Start: 2023-01-01 | End: 2023-01-01

## 2023-01-01 RX ORDER — FENTANYL CITRATE 50 UG/ML
25 INJECTION, SOLUTION INTRAMUSCULAR; INTRAVENOUS
Status: CANCELLED | OUTPATIENT
Start: 2023-01-01

## 2023-01-01 RX ORDER — MUPIROCIN 20 MG/G
1 OINTMENT TOPICAL
Status: COMPLETED | OUTPATIENT
Start: 2023-01-01 | End: 2023-01-01

## 2023-01-01 RX ORDER — PREGABALIN 75 MG/1
75 CAPSULE ORAL 2 TIMES DAILY
Status: ON HOLD | COMMUNITY
End: 2023-01-01

## 2023-01-01 RX ORDER — ACETAMINOPHEN 325 MG/1
650 TABLET ORAL EVERY 4 HOURS PRN
Status: CANCELLED | OUTPATIENT
Start: 2023-01-01

## 2023-01-01 RX ORDER — PROPOFOL 10 MG/ML
VIAL (ML) INTRAVENOUS
Status: DISCONTINUED | OUTPATIENT
Start: 2023-01-01 | End: 2023-01-01

## 2023-01-01 RX ORDER — METOPROLOL SUCCINATE 25 MG/1
25 TABLET, EXTENDED RELEASE ORAL DAILY
Status: DISCONTINUED | OUTPATIENT
Start: 2023-01-01 | End: 2023-01-01 | Stop reason: HOSPADM

## 2023-01-01 RX ORDER — ATORVASTATIN CALCIUM 20 MG/1
80 TABLET, FILM COATED ORAL DAILY
Status: DISCONTINUED | OUTPATIENT
Start: 2023-09-02 | End: 2023-09-02 | Stop reason: HOSPADM

## 2023-01-01 RX ORDER — FENTANYL CITRATE 50 UG/ML
50 INJECTION, SOLUTION INTRAMUSCULAR; INTRAVENOUS
Status: CANCELLED | OUTPATIENT
Start: 2023-01-01

## 2023-01-01 RX ORDER — MAGNESIUM SULFATE HEPTAHYDRATE 40 MG/ML
4 INJECTION, SOLUTION INTRAVENOUS
Status: DISCONTINUED | OUTPATIENT
Start: 2023-01-01 | End: 2023-09-02 | Stop reason: HOSPADM

## 2023-01-01 RX ORDER — NAPROXEN SODIUM 220 MG/1
81 TABLET, FILM COATED ORAL ONCE
Status: CANCELLED | OUTPATIENT
Start: 2023-01-01 | End: 2023-01-01

## 2023-01-01 RX ORDER — ALBUMIN HUMAN 50 G/1000ML
25 SOLUTION INTRAVENOUS ONCE AS NEEDED
Status: CANCELLED | OUTPATIENT
Start: 2023-01-01 | End: 2035-01-25

## 2023-01-01 RX ORDER — ASPIRIN 325 MG
325 TABLET, DELAYED RELEASE (ENTERIC COATED) ORAL DAILY
Status: CANCELLED | OUTPATIENT
Start: 2023-01-01

## 2023-01-01 RX ORDER — FENTANYL CITRATE 50 UG/ML
INJECTION, SOLUTION INTRAMUSCULAR; INTRAVENOUS
Status: DISCONTINUED | OUTPATIENT
Start: 2023-01-01 | End: 2023-01-01

## 2023-01-01 RX ORDER — POTASSIUM CHLORIDE 20 MEQ/1
20 TABLET, EXTENDED RELEASE ORAL ONCE
Status: COMPLETED | OUTPATIENT
Start: 2023-01-01 | End: 2023-01-01

## 2023-01-01 RX ORDER — HEPARIN SODIUM,PORCINE/D5W 25000/250
0-40 INTRAVENOUS SOLUTION INTRAVENOUS CONTINUOUS
Status: DISCONTINUED | OUTPATIENT
Start: 2023-01-01 | End: 2023-01-01 | Stop reason: HOSPADM

## 2023-01-01 RX ORDER — SODIUM CHLORIDE 0.9 % (FLUSH) 0.9 %
10 SYRINGE (ML) INJECTION
Status: CANCELLED | OUTPATIENT
Start: 2023-01-01

## 2023-01-01 RX ORDER — LIDOCAINE HYDROCHLORIDE 10 MG/ML
1 INJECTION, SOLUTION EPIDURAL; INFILTRATION; INTRACAUDAL; PERINEURAL
Status: CANCELLED | OUTPATIENT
Start: 2023-01-01

## 2023-01-01 RX ORDER — SODIUM CHLORIDE 9 MG/ML
INJECTION, SOLUTION INTRAVENOUS CONTINUOUS
Status: ACTIVE | OUTPATIENT
Start: 2023-01-01 | End: 2023-01-01

## 2023-01-01 RX ORDER — IPRATROPIUM BROMIDE AND ALBUTEROL SULFATE 2.5; .5 MG/3ML; MG/3ML
3 SOLUTION RESPIRATORY (INHALATION) EVERY 4 HOURS
Status: CANCELLED | OUTPATIENT
Start: 2023-01-01 | End: 2023-01-01

## 2023-01-01 RX ORDER — LANOLIN ALCOHOL/MO/W.PET/CERES
400 CREAM (GRAM) TOPICAL DAILY
Qty: 30 TABLET | Refills: 0 | Status: SHIPPED | OUTPATIENT
Start: 2023-01-01 | End: 2023-01-01

## 2023-01-01 RX ORDER — IRON,CARBONYL/ASCORBIC ACID 100-250 MG
TABLET ORAL DAILY
COMMUNITY

## 2023-01-01 RX ORDER — NOREPINEPHRINE BITARTRATE/D5W 4MG/250ML
PLASTIC BAG, INJECTION (ML) INTRAVENOUS
Status: DISPENSED
Start: 2023-01-01 | End: 2023-01-01

## 2023-01-01 RX ORDER — ACETAMINOPHEN 500 MG
1000 TABLET ORAL
Status: CANCELLED | OUTPATIENT
Start: 2023-01-01 | End: 2023-01-01

## 2023-01-01 RX ORDER — ASPIRIN 300 MG/1
300 SUPPOSITORY RECTAL ONCE AS NEEDED
Status: CANCELLED | OUTPATIENT
Start: 2023-01-01 | End: 2035-01-25

## 2023-01-01 RX ORDER — ROCURONIUM BROMIDE 10 MG/ML
INJECTION, SOLUTION INTRAVENOUS
Status: DISCONTINUED | OUTPATIENT
Start: 2023-01-01 | End: 2023-01-01

## 2023-01-01 RX ORDER — POTASSIUM CHLORIDE 20 MEQ/1
20 TABLET, EXTENDED RELEASE ORAL EVERY 12 HOURS
Status: CANCELLED | OUTPATIENT
Start: 2023-01-01

## 2023-01-01 RX ORDER — PROPOFOL 10 MG/ML
INJECTION, EMULSION INTRAVENOUS
Status: DISCONTINUED
Start: 2023-01-01 | End: 2023-09-02 | Stop reason: HOSPADM

## 2023-01-01 RX ORDER — OXYBUTYNIN CHLORIDE 5 MG/1
5 TABLET, EXTENDED RELEASE ORAL DAILY
Status: DISCONTINUED | OUTPATIENT
Start: 2023-01-01 | End: 2023-01-01 | Stop reason: HOSPADM

## 2023-01-01 RX ORDER — PANTOPRAZOLE SODIUM 40 MG/1
40 TABLET, DELAYED RELEASE ORAL DAILY
Status: DISCONTINUED | OUTPATIENT
Start: 2023-01-01 | End: 2023-01-01 | Stop reason: HOSPADM

## 2023-01-01 RX ORDER — VALSARTAN 40 MG/1
40 TABLET ORAL 2 TIMES DAILY
Qty: 60 TABLET | Refills: 5 | Status: SHIPPED | OUTPATIENT
Start: 2023-01-01 | End: 2024-07-31

## 2023-01-01 RX ORDER — SODIUM BICARBONATE 1 MEQ/ML
SYRINGE (ML) INTRAVENOUS CODE/TRAUMA/SEDATION MEDICATION
Status: COMPLETED | OUTPATIENT
Start: 2023-01-01 | End: 2023-01-01

## 2023-01-01 RX ORDER — METOPROLOL TARTRATE 25 MG/1
25 TABLET, FILM COATED ORAL
Status: COMPLETED | OUTPATIENT
Start: 2023-01-01 | End: 2023-01-01

## 2023-01-01 RX ORDER — ALBUMIN HUMAN 50 G/1000ML
12.5 SOLUTION INTRAVENOUS ONCE
Status: COMPLETED | OUTPATIENT
Start: 2023-01-01 | End: 2023-01-01

## 2023-01-01 RX ORDER — ONDANSETRON 2 MG/ML
INJECTION INTRAMUSCULAR; INTRAVENOUS
Status: DISCONTINUED | OUTPATIENT
Start: 2023-01-01 | End: 2023-01-01

## 2023-01-01 RX ORDER — ASPIRIN 300 MG/1
300 SUPPOSITORY RECTAL ONCE AS NEEDED
Status: DISCONTINUED | OUTPATIENT
Start: 2023-01-01 | End: 2023-01-01

## 2023-01-01 RX ORDER — NAPROXEN SODIUM 220 MG/1
81 TABLET, FILM COATED ORAL DAILY
Status: DISCONTINUED | OUTPATIENT
Start: 2023-01-01 | End: 2023-09-02 | Stop reason: HOSPADM

## 2023-01-01 RX ORDER — ACETAMINOPHEN 10 MG/ML
1000 INJECTION, SOLUTION INTRAVENOUS ONCE
Status: COMPLETED | OUTPATIENT
Start: 2023-01-01 | End: 2023-01-01

## 2023-01-01 RX ORDER — ACETAMINOPHEN 500 MG
1000 TABLET ORAL EVERY 8 HOURS
Status: DISCONTINUED | OUTPATIENT
Start: 2023-09-02 | End: 2023-09-02 | Stop reason: HOSPADM

## 2023-01-01 RX ORDER — POTASSIUM CHLORIDE 20 MEQ/1
40 TABLET, EXTENDED RELEASE ORAL ONCE
Status: COMPLETED | OUTPATIENT
Start: 2023-01-01 | End: 2023-01-01

## 2023-01-01 RX ORDER — DEXTROSE MONOHYDRATE, SODIUM CHLORIDE, AND POTASSIUM CHLORIDE 50; 1.49; 4.5 G/1000ML; G/1000ML; G/1000ML
INJECTION, SOLUTION INTRAVENOUS CONTINUOUS
Status: CANCELLED | OUTPATIENT
Start: 2023-01-01

## 2023-01-01 RX ORDER — SOLIFENACIN SUCCINATE 5 MG/1
5 TABLET, FILM COATED ORAL
COMMUNITY

## 2023-01-01 RX ORDER — POTASSIUM CHLORIDE 14.9 MG/ML
20 INJECTION INTRAVENOUS
Status: DISCONTINUED | OUTPATIENT
Start: 2023-01-01 | End: 2023-09-02 | Stop reason: HOSPADM

## 2023-01-01 RX ORDER — IBUPROFEN 200 MG
24 TABLET ORAL
Status: DISCONTINUED | OUTPATIENT
Start: 2023-01-01 | End: 2023-01-01

## 2023-01-01 RX ORDER — FENTANYL CITRATE 50 UG/ML
50 INJECTION, SOLUTION INTRAMUSCULAR; INTRAVENOUS
Status: DISCONTINUED | OUTPATIENT
Start: 2023-01-01 | End: 2023-01-01

## 2023-01-01 RX ORDER — GABAPENTIN 250 MG/5ML
400 SOLUTION ORAL EVERY 8 HOURS
Status: DISCONTINUED | OUTPATIENT
Start: 2023-01-01 | End: 2023-09-02 | Stop reason: HOSPADM

## 2023-01-01 RX ORDER — HEPARIN SODIUM 1000 [USP'U]/ML
INJECTION, SOLUTION INTRAVENOUS; SUBCUTANEOUS
Status: DISCONTINUED | OUTPATIENT
Start: 2023-01-01 | End: 2023-01-01

## 2023-01-01 RX ORDER — CLOPIDOGREL BISULFATE 75 MG/1
75 TABLET ORAL DAILY
Status: DISCONTINUED | OUTPATIENT
Start: 2023-09-02 | End: 2023-09-02 | Stop reason: HOSPADM

## 2023-01-01 RX ORDER — DIAZEPAM 2 MG/1
2 TABLET ORAL ONCE
Status: COMPLETED | OUTPATIENT
Start: 2023-01-01 | End: 2023-01-01

## 2023-01-01 RX ORDER — LIDOCAINE HYDROCHLORIDE 20 MG/ML
INJECTION, SOLUTION INFILTRATION; PERINEURAL
Status: DISCONTINUED | OUTPATIENT
Start: 2023-01-01 | End: 2023-01-01

## 2023-01-01 RX ORDER — FAMOTIDINE 20 MG/1
20 TABLET, FILM COATED ORAL 2 TIMES DAILY
Status: CANCELLED | OUTPATIENT
Start: 2023-01-01

## 2023-01-01 RX ORDER — FENTANYL CITRATE 50 UG/ML
INJECTION, SOLUTION INTRAMUSCULAR; INTRAVENOUS
Status: DISCONTINUED | OUTPATIENT
Start: 2023-01-01 | End: 2023-01-01 | Stop reason: HOSPADM

## 2023-01-01 RX ORDER — VASOPRESSIN 20 [USP'U]/ML
INJECTION, SOLUTION INTRAMUSCULAR; SUBCUTANEOUS
Status: COMPLETED
Start: 2023-01-01 | End: 2023-01-01

## 2023-01-01 RX ORDER — FENTANYL CITRATE 50 UG/ML
25 INJECTION, SOLUTION INTRAMUSCULAR; INTRAVENOUS
Status: CANCELLED | OUTPATIENT
Start: 2023-01-01 | End: 2023-01-01

## 2023-01-01 RX ORDER — CALCIUM CHLORIDE INJECTION 100 MG/ML
INJECTION, SOLUTION INTRAVENOUS CODE/TRAUMA/SEDATION MEDICATION
Status: COMPLETED | OUTPATIENT
Start: 2023-01-01 | End: 2023-01-01

## 2023-01-01 RX ORDER — LISINOPRIL 5 MG/1
5 TABLET ORAL
Status: ON HOLD | COMMUNITY
Start: 2023-01-01 | End: 2023-01-01

## 2023-01-01 RX ORDER — PROPOFOL 10 MG/ML
40 VIAL (ML) INTRAVENOUS ONCE
Status: COMPLETED | OUTPATIENT
Start: 2023-01-01 | End: 2023-01-01

## 2023-01-01 RX ORDER — ATORVASTATIN CALCIUM 40 MG/1
80 TABLET, FILM COATED ORAL NIGHTLY
Status: DISCONTINUED | OUTPATIENT
Start: 2023-01-01 | End: 2023-01-01 | Stop reason: HOSPADM

## 2023-01-01 RX ORDER — MAGNESIUM SULFATE HEPTAHYDRATE 40 MG/ML
4 INJECTION, SOLUTION INTRAVENOUS ONCE
Status: COMPLETED | OUTPATIENT
Start: 2023-01-01 | End: 2023-01-01

## 2023-01-01 RX ORDER — CLOPIDOGREL BISULFATE 75 MG/1
75 TABLET ORAL DAILY
Status: DISCONTINUED | OUTPATIENT
Start: 2023-01-01 | End: 2023-01-01

## 2023-01-01 RX ORDER — CARVEDILOL 25 MG/1
25 TABLET ORAL 2 TIMES DAILY
Status: ON HOLD | COMMUNITY
Start: 2023-01-01 | End: 2023-01-01 | Stop reason: HOSPADM

## 2023-01-01 RX ORDER — CLOPIDOGREL BISULFATE 75 MG/1
75 TABLET ORAL
COMMUNITY
Start: 2022-01-01

## 2023-01-01 RX ORDER — TAMSULOSIN HYDROCHLORIDE 0.4 MG/1
0.4 CAPSULE ORAL DAILY
Status: DISCONTINUED | OUTPATIENT
Start: 2023-01-01 | End: 2023-09-02 | Stop reason: HOSPADM

## 2023-01-01 RX ORDER — IPRATROPIUM BROMIDE AND ALBUTEROL SULFATE 2.5; .5 MG/3ML; MG/3ML
3 SOLUTION RESPIRATORY (INHALATION) EVERY 4 HOURS
Status: COMPLETED | OUTPATIENT
Start: 2023-01-01 | End: 2023-01-01

## 2023-01-01 RX ORDER — LIDOCAINE HYDROCHLORIDE 20 MG/ML
3 INJECTION, SOLUTION INFILTRATION; PERINEURAL
Status: DISCONTINUED | OUTPATIENT
Start: 2023-01-01 | End: 2023-01-01 | Stop reason: HOSPADM

## 2023-01-01 RX ORDER — SODIUM CHLORIDE 9 MG/ML
INJECTION, SOLUTION INTRAVENOUS CONTINUOUS
Status: CANCELLED | OUTPATIENT
Start: 2023-01-01

## 2023-01-01 RX ORDER — PROPOFOL 10 MG/ML
INJECTION, EMULSION INTRAVENOUS
Status: DISPENSED
Start: 2023-01-01 | End: 2023-01-01

## 2023-01-01 RX ORDER — VALSARTAN 80 MG/1
80 TABLET ORAL 2 TIMES DAILY
Status: DISCONTINUED | OUTPATIENT
Start: 2023-01-01 | End: 2023-01-01

## 2023-01-01 RX ORDER — PROPOFOL 10 MG/ML
0-50 INJECTION, EMULSION INTRAVENOUS CONTINUOUS
Status: CANCELLED | OUTPATIENT
Start: 2023-01-01

## 2023-01-01 RX ORDER — FENTANYL CITRATE 50 UG/ML
25 INJECTION, SOLUTION INTRAMUSCULAR; INTRAVENOUS
Status: DISCONTINUED | OUTPATIENT
Start: 2023-01-01 | End: 2023-01-01

## 2023-01-01 RX ORDER — ALBUMIN HUMAN 50 G/1000ML
25 SOLUTION INTRAVENOUS ONCE AS NEEDED
Status: COMPLETED | OUTPATIENT
Start: 2023-01-01 | End: 2023-01-01

## 2023-01-01 RX ORDER — ATORVASTATIN CALCIUM 20 MG/1
80 TABLET, FILM COATED ORAL DAILY
Status: DISCONTINUED | OUTPATIENT
Start: 2023-01-01 | End: 2023-01-01

## 2023-01-01 RX ORDER — HYDROMORPHONE HYDROCHLORIDE 1 MG/ML
0.5 INJECTION, SOLUTION INTRAMUSCULAR; INTRAVENOUS; SUBCUTANEOUS
Status: DISCONTINUED | OUTPATIENT
Start: 2023-01-01 | End: 2023-09-02 | Stop reason: HOSPADM

## 2023-01-01 RX ORDER — GABAPENTIN 400 MG/1
400 CAPSULE ORAL 3 TIMES DAILY
Status: CANCELLED | OUTPATIENT
Start: 2023-01-01

## 2023-01-01 RX ORDER — ONDANSETRON 4 MG/1
8 TABLET, ORALLY DISINTEGRATING ORAL EVERY 8 HOURS PRN
Status: DISCONTINUED | OUTPATIENT
Start: 2023-01-01 | End: 2023-01-01 | Stop reason: HOSPADM

## 2023-01-01 RX ORDER — KETAMINE HCL IN 0.9 % NACL 50 MG/5 ML
SYRINGE (ML) INTRAVENOUS
Status: DISCONTINUED | OUTPATIENT
Start: 2023-01-01 | End: 2023-01-01

## 2023-01-01 RX ORDER — CARVEDILOL 12.5 MG/1
12.5 TABLET ORAL 2 TIMES DAILY
Status: DISCONTINUED | OUTPATIENT
Start: 2023-01-01 | End: 2023-01-01 | Stop reason: HOSPADM

## 2023-01-01 RX ORDER — ENOXAPARIN SODIUM 100 MG/ML
40 INJECTION SUBCUTANEOUS EVERY 24 HOURS
Status: DISCONTINUED | OUTPATIENT
Start: 2023-01-01 | End: 2023-01-01 | Stop reason: HOSPADM

## 2023-01-01 RX ORDER — MUPIROCIN 20 MG/G
1 OINTMENT TOPICAL 2 TIMES DAILY
Status: DISCONTINUED | OUTPATIENT
Start: 2023-01-01 | End: 2023-09-02 | Stop reason: HOSPADM

## 2023-01-01 RX ORDER — METHOCARBAMOL 500 MG/1
500 TABLET, FILM COATED ORAL 4 TIMES DAILY
Status: DISCONTINUED | OUTPATIENT
Start: 2023-01-01 | End: 2023-09-02 | Stop reason: HOSPADM

## 2023-01-01 RX ORDER — EPINEPHRINE 0.1 MG/ML
INJECTION INTRAVENOUS
Status: DISCONTINUED | OUTPATIENT
Start: 2023-01-01 | End: 2023-01-01

## 2023-01-01 RX ORDER — ROCURONIUM BROMIDE 10 MG/ML
INJECTION, SOLUTION INTRAVENOUS
Status: DISCONTINUED
Start: 2023-01-01 | End: 2023-09-02 | Stop reason: HOSPADM

## 2023-01-01 RX ORDER — DEXMEDETOMIDINE HYDROCHLORIDE 4 UG/ML
0-1.4 INJECTION, SOLUTION INTRAVENOUS CONTINUOUS
Status: DISCONTINUED | OUTPATIENT
Start: 2023-01-01 | End: 2023-01-01

## 2023-01-01 RX ORDER — MAGNESIUM SULFATE HEPTAHYDRATE 40 MG/ML
2 INJECTION, SOLUTION INTRAVENOUS ONCE
Status: COMPLETED | OUTPATIENT
Start: 2023-01-01 | End: 2023-01-01

## 2023-01-01 RX ORDER — ALBUMIN HUMAN 50 G/1000ML
12.5 SOLUTION INTRAVENOUS ONCE
Status: DISCONTINUED | OUTPATIENT
Start: 2023-01-01 | End: 2023-09-02 | Stop reason: HOSPADM

## 2023-01-01 RX ORDER — POLYETHYLENE GLYCOL 3350 17 G/17G
17 POWDER, FOR SOLUTION ORAL DAILY
Status: DISCONTINUED | OUTPATIENT
Start: 2023-01-01 | End: 2023-09-02 | Stop reason: HOSPADM

## 2023-01-01 RX ORDER — METOPROLOL TARTRATE 25 MG/1
25 TABLET, FILM COATED ORAL
Status: CANCELLED | OUTPATIENT
Start: 2023-01-01

## 2023-01-01 RX ORDER — DIAZEPAM 5 MG/1
10 TABLET ORAL
Status: DISPENSED | OUTPATIENT
Start: 2023-01-01

## 2023-01-01 RX ORDER — GLUCAGON 1 MG
1 KIT INJECTION
Status: DISCONTINUED | OUTPATIENT
Start: 2023-01-01 | End: 2023-01-01

## 2023-01-01 RX ORDER — METOCLOPRAMIDE HYDROCHLORIDE 5 MG/ML
5 INJECTION INTRAMUSCULAR; INTRAVENOUS EVERY 6 HOURS PRN
Status: CANCELLED | OUTPATIENT
Start: 2023-01-01

## 2023-01-01 RX ORDER — MORPHINE SULFATE 4 MG/ML
2 INJECTION, SOLUTION INTRAMUSCULAR; INTRAVENOUS EVERY 4 HOURS PRN
Status: DISCONTINUED | OUTPATIENT
Start: 2023-01-01 | End: 2023-01-01 | Stop reason: HOSPADM

## 2023-01-01 RX ORDER — LIDOCAINE HYDROCHLORIDE 20 MG/ML
INJECTION, SOLUTION EPIDURAL; INFILTRATION; INTRACAUDAL; PERINEURAL
Status: DISCONTINUED | OUTPATIENT
Start: 2023-01-01 | End: 2023-01-01

## 2023-01-01 RX ORDER — IBUPROFEN 200 MG
24 TABLET ORAL
Status: DISCONTINUED | OUTPATIENT
Start: 2023-01-01 | End: 2023-01-01 | Stop reason: HOSPADM

## 2023-01-01 RX ORDER — CEFAZOLIN SODIUM 2 G/50ML
2 SOLUTION INTRAVENOUS
Status: CANCELLED | OUTPATIENT
Start: 2023-01-01

## 2023-01-01 RX ORDER — TRANEXAMIC ACID 100 MG/ML
INJECTION, SOLUTION INTRAVENOUS
Status: DISCONTINUED | OUTPATIENT
Start: 2023-01-01 | End: 2023-01-01

## 2023-01-01 RX ORDER — PANTOPRAZOLE SODIUM 40 MG/1
40 TABLET, DELAYED RELEASE ORAL DAILY
COMMUNITY
Start: 2023-01-01

## 2023-01-01 RX ORDER — CARVEDILOL 12.5 MG/1
12.5 TABLET ORAL 2 TIMES DAILY
Status: DISCONTINUED | OUTPATIENT
Start: 2023-01-01 | End: 2023-01-01

## 2023-01-01 RX ORDER — POLYETHYLENE GLYCOL 3350 17 G/17G
17 POWDER, FOR SOLUTION ORAL DAILY
Status: CANCELLED | OUTPATIENT
Start: 2023-01-01

## 2023-01-01 RX ORDER — MUPIROCIN 20 MG/G
OINTMENT TOPICAL
Status: CANCELLED | OUTPATIENT
Start: 2023-01-01

## 2023-01-01 RX ORDER — HYDRALAZINE HYDROCHLORIDE 20 MG/ML
10 INJECTION INTRAMUSCULAR; INTRAVENOUS EVERY 4 HOURS PRN
Status: DISCONTINUED | OUTPATIENT
Start: 2023-01-01 | End: 2023-01-01 | Stop reason: HOSPADM

## 2023-01-01 RX ORDER — VALSARTAN 40 MG/1
40 TABLET ORAL 2 TIMES DAILY
Status: DISCONTINUED | OUTPATIENT
Start: 2023-01-01 | End: 2023-01-01

## 2023-01-01 RX ORDER — NOREPINEPHRINE BITARTRATE/D5W 4MG/250ML
PLASTIC BAG, INJECTION (ML) INTRAVENOUS
Status: DISCONTINUED
Start: 2023-01-01 | End: 2023-09-02 | Stop reason: HOSPADM

## 2023-01-01 RX ORDER — INSULIN ASPART 100 [IU]/ML
0-5 INJECTION, SOLUTION INTRAVENOUS; SUBCUTANEOUS
Status: DISCONTINUED | OUTPATIENT
Start: 2023-01-01 | End: 2023-01-01 | Stop reason: HOSPADM

## 2023-01-01 RX ORDER — ALBUTEROL SULFATE 2.5 MG/.5ML
2.5 SOLUTION RESPIRATORY (INHALATION) EVERY 4 HOURS
Status: DISCONTINUED | OUTPATIENT
Start: 2023-01-01 | End: 2023-01-01

## 2023-01-01 RX ORDER — INSULIN ASPART 100 [IU]/ML
0-10 INJECTION, SOLUTION INTRAVENOUS; SUBCUTANEOUS EVERY 4 HOURS PRN
Status: DISCONTINUED | OUTPATIENT
Start: 2023-01-01 | End: 2023-09-02 | Stop reason: HOSPADM

## 2023-01-01 RX ORDER — BISACODYL 10 MG
10 SUPPOSITORY, RECTAL RECTAL DAILY PRN
Status: CANCELLED | OUTPATIENT
Start: 2023-01-01

## 2023-01-01 RX ORDER — ALBUTEROL SULFATE 2.5 MG/.5ML
2.5 SOLUTION RESPIRATORY (INHALATION)
Status: DISCONTINUED | OUTPATIENT
Start: 2023-01-01 | End: 2023-09-02 | Stop reason: HOSPADM

## 2023-01-01 RX ORDER — POTASSIUM CHLORIDE 14.9 MG/ML
60 INJECTION INTRAVENOUS
Status: CANCELLED | OUTPATIENT
Start: 2023-01-01

## 2023-01-01 RX ORDER — NITROGLYCERIN 0.4 MG/1
0.4 TABLET SUBLINGUAL EVERY 5 MIN PRN
Qty: 25 TABLET | Refills: 1 | Status: SHIPPED | OUTPATIENT
Start: 2023-01-01 | End: 2024-07-31

## 2023-01-01 RX ORDER — EPINEPHRINE 0.1 MG/ML
INJECTION INTRAVENOUS CODE/TRAUMA/SEDATION MEDICATION
Status: COMPLETED | OUTPATIENT
Start: 2023-01-01 | End: 2023-01-01

## 2023-01-01 RX ORDER — DOBUTAMINE HYDROCHLORIDE 400 MG/100ML
5 INJECTION INTRAVENOUS CONTINUOUS
Status: DISCONTINUED | OUTPATIENT
Start: 2023-01-01 | End: 2023-01-01

## 2023-01-01 RX ORDER — FERROUS SULFATE 324(65)MG
65 TABLET, DELAYED RELEASE (ENTERIC COATED) ORAL
Status: ON HOLD | COMMUNITY
End: 2023-01-01

## 2023-01-01 RX ORDER — ATORVASTATIN CALCIUM 80 MG/1
80 TABLET, FILM COATED ORAL DAILY
COMMUNITY
Start: 2023-01-01

## 2023-01-01 RX ORDER — HYDROCODONE BITARTRATE AND ACETAMINOPHEN 500; 5 MG/1; MG/1
TABLET ORAL
Status: DISCONTINUED | OUTPATIENT
Start: 2023-01-01 | End: 2023-01-01 | Stop reason: HOSPADM

## 2023-01-01 RX ORDER — HEPARIN SOD,PORCINE/0.9 % NACL 1000/500ML
INTRAVENOUS SOLUTION INTRAVENOUS
Status: DISCONTINUED | OUTPATIENT
Start: 2023-01-01 | End: 2023-01-01

## 2023-01-01 RX ORDER — PAPAVERINE HYDROCHLORIDE 30 MG/ML
INJECTION INTRAMUSCULAR; INTRAVENOUS
Status: DISCONTINUED | OUTPATIENT
Start: 2023-01-01 | End: 2023-01-01 | Stop reason: HOSPADM

## 2023-01-01 RX ORDER — ATORVASTATIN CALCIUM 40 MG/1
40 TABLET, FILM COATED ORAL NIGHTLY
Status: CANCELLED | OUTPATIENT
Start: 2023-01-01

## 2023-01-01 RX ORDER — OXYCODONE HYDROCHLORIDE 5 MG/1
5 TABLET ORAL EVERY 4 HOURS PRN
Status: DISCONTINUED | OUTPATIENT
Start: 2023-01-01 | End: 2023-09-02 | Stop reason: HOSPADM

## 2023-01-01 RX ORDER — POTASSIUM CHLORIDE 29.8 MG/ML
40 INJECTION INTRAVENOUS
Status: CANCELLED | OUTPATIENT
Start: 2023-01-01

## 2023-01-01 RX ORDER — ALBUMIN HUMAN 50 G/1000ML
SOLUTION INTRAVENOUS
Status: DISCONTINUED
Start: 2023-01-01 | End: 2023-01-01 | Stop reason: HOSPADM

## 2023-01-01 RX ORDER — PANTOPRAZOLE SODIUM 40 MG/1
40 TABLET, DELAYED RELEASE ORAL DAILY
Status: DISCONTINUED | OUTPATIENT
Start: 2023-09-02 | End: 2023-09-02 | Stop reason: HOSPADM

## 2023-01-01 RX ORDER — FUROSEMIDE 10 MG/ML
40 INJECTION INTRAMUSCULAR; INTRAVENOUS 2 TIMES DAILY
Status: DISCONTINUED | OUTPATIENT
Start: 2023-01-01 | End: 2023-01-01

## 2023-01-01 RX ORDER — HYDROMORPHONE HYDROCHLORIDE 1 MG/ML
0.2 INJECTION, SOLUTION INTRAMUSCULAR; INTRAVENOUS; SUBCUTANEOUS EVERY 5 MIN PRN
Status: CANCELLED | OUTPATIENT
Start: 2023-01-01

## 2023-01-01 RX ORDER — ONDANSETRON 2 MG/ML
4 INJECTION INTRAMUSCULAR; INTRAVENOUS EVERY 12 HOURS PRN
Status: CANCELLED | OUTPATIENT
Start: 2023-01-01

## 2023-01-01 RX ORDER — LIDOCAINE HYDROCHLORIDE 10 MG/ML
1 INJECTION, SOLUTION EPIDURAL; INFILTRATION; INTRACAUDAL; PERINEURAL
Status: DISCONTINUED | OUTPATIENT
Start: 2023-01-01 | End: 2023-01-01 | Stop reason: HOSPADM

## 2023-01-01 RX ORDER — MILRINONE LACTATE 0.2 MG/ML
0.25 INJECTION, SOLUTION INTRAVENOUS CONTINUOUS
Status: DISCONTINUED | OUTPATIENT
Start: 2023-01-01 | End: 2023-01-01

## 2023-01-01 RX ORDER — ATORVASTATIN CALCIUM 20 MG/1
80 TABLET, FILM COATED ORAL NIGHTLY
Status: DISCONTINUED | OUTPATIENT
Start: 2023-01-01 | End: 2023-01-01 | Stop reason: HOSPADM

## 2023-01-01 RX ORDER — HYDROCODONE BITARTRATE AND ACETAMINOPHEN 5; 325 MG/1; MG/1
1 TABLET ORAL EVERY 4 HOURS PRN
Status: DISCONTINUED | OUTPATIENT
Start: 2023-01-01 | End: 2023-01-01 | Stop reason: HOSPADM

## 2023-01-01 RX ORDER — DIPHENHYDRAMINE HCL 25 MG
50 CAPSULE ORAL
Status: DISPENSED | OUTPATIENT
Start: 2023-01-01

## 2023-01-01 RX ORDER — MIDAZOLAM HYDROCHLORIDE 1 MG/ML
INJECTION INTRAMUSCULAR; INTRAVENOUS
Status: DISCONTINUED | OUTPATIENT
Start: 2023-01-01 | End: 2023-01-01 | Stop reason: HOSPADM

## 2023-01-01 RX ORDER — VALSARTAN 40 MG/1
40 TABLET ORAL 2 TIMES DAILY
Status: DISCONTINUED | OUTPATIENT
Start: 2023-01-01 | End: 2023-01-01 | Stop reason: HOSPADM

## 2023-01-01 RX ORDER — OXYCODONE HYDROCHLORIDE 15 MG/1
TABLET ORAL
COMMUNITY
Start: 2023-01-01

## 2023-01-01 RX ORDER — HALOPERIDOL 5 MG/ML
0.5 INJECTION INTRAMUSCULAR EVERY 10 MIN PRN
Status: CANCELLED | OUTPATIENT
Start: 2023-01-01

## 2023-01-01 RX ORDER — NAPROXEN SODIUM 220 MG/1
81 TABLET, FILM COATED ORAL ONCE
Status: DISCONTINUED | OUTPATIENT
Start: 2023-01-01 | End: 2023-01-01

## 2023-01-01 RX ADMIN — MAGNESIUM SULFATE HEPTAHYDRATE 2 G: 40 INJECTION, SOLUTION INTRAVENOUS at 07:08

## 2023-01-01 RX ADMIN — OXYCODONE HYDROCHLORIDE 5 MG: 5 TABLET ORAL at 01:08

## 2023-01-01 RX ADMIN — POTASSIUM CHLORIDE 20 MEQ: 200 INJECTION, SOLUTION INTRAVENOUS at 10:09

## 2023-01-01 RX ADMIN — PROPOFOL 40 MG: 10 INJECTION, EMULSION INTRAVENOUS at 03:09

## 2023-01-01 RX ADMIN — GABAPENTIN 400 MG: 250 SOLUTION ORAL at 02:08

## 2023-01-01 RX ADMIN — SODIUM CHLORIDE: 0.9 INJECTION, SOLUTION INTRAVENOUS at 08:08

## 2023-01-01 RX ADMIN — CARVEDILOL 12.5 MG: 12.5 TABLET, FILM COATED ORAL at 08:07

## 2023-01-01 RX ADMIN — FUROSEMIDE 40 MG: 10 INJECTION, SOLUTION INTRAMUSCULAR; INTRAVENOUS at 05:07

## 2023-01-01 RX ADMIN — FAMOTIDINE 20 MG: 10 INJECTION, SOLUTION INTRAVENOUS at 09:08

## 2023-01-01 RX ADMIN — ACETAMINOPHEN 1000 MG: 10 INJECTION, SOLUTION INTRAVENOUS at 01:09

## 2023-01-01 RX ADMIN — PHENYLEPHRINE HYDROCHLORIDE 400 MCG: 10 INJECTION INTRAVENOUS at 08:08

## 2023-01-01 RX ADMIN — CLOPIDOGREL BISULFATE 75 MG: 75 TABLET ORAL at 08:07

## 2023-01-01 RX ADMIN — FUROSEMIDE 40 MG: 10 INJECTION, SOLUTION INTRAMUSCULAR; INTRAVENOUS at 08:07

## 2023-01-01 RX ADMIN — METHOCARBAMOL 500 MG: 500 TABLET ORAL at 09:08

## 2023-01-01 RX ADMIN — AMIODARONE HYDROCHLORIDE 150 MG: 1.5 INJECTION, SOLUTION INTRAVENOUS at 11:09

## 2023-01-01 RX ADMIN — VALSARTAN 40 MG: 40 TABLET, FILM COATED ORAL at 09:08

## 2023-01-01 RX ADMIN — TAMSULOSIN HYDROCHLORIDE 0.4 MG: 0.4 CAPSULE ORAL at 09:08

## 2023-01-01 RX ADMIN — CLOPIDOGREL BISULFATE 600 MG: 300 TABLET, FILM COATED ORAL at 12:07

## 2023-01-01 RX ADMIN — ALBUMIN (HUMAN) 25 G: 12.5 SOLUTION INTRAVENOUS at 10:09

## 2023-01-01 RX ADMIN — CARVEDILOL 12.5 MG: 12.5 TABLET, FILM COATED ORAL at 10:07

## 2023-01-01 RX ADMIN — ASPIRIN 81 MG 81 MG: 81 TABLET ORAL at 09:08

## 2023-01-01 RX ADMIN — Medication 600 MCG: at 11:09

## 2023-01-01 RX ADMIN — VASOPRESSIN 0.04 UNITS/MIN: 20 INJECTION INTRAVENOUS at 03:09

## 2023-01-01 RX ADMIN — EPINEPHRINE 0.12 MCG/KG/MIN: 1 INJECTION INTRAMUSCULAR; INTRAVENOUS; SUBCUTANEOUS at 11:09

## 2023-01-01 RX ADMIN — INSULIN ASPART 2 UNITS: 100 INJECTION, SOLUTION INTRAVENOUS; SUBCUTANEOUS at 07:07

## 2023-01-01 RX ADMIN — ASPIRIN 81 MG: 81 TABLET, COATED ORAL at 09:07

## 2023-01-01 RX ADMIN — HEPARIN SODIUM 12 UNITS/KG/HR: 10000 INJECTION, SOLUTION INTRAVENOUS at 11:07

## 2023-01-01 RX ADMIN — ATORVASTATIN CALCIUM 80 MG: 40 TABLET, FILM COATED ORAL at 08:07

## 2023-01-01 RX ADMIN — FUROSEMIDE 40 MG: 10 INJECTION, SOLUTION INTRAMUSCULAR; INTRAVENOUS at 09:07

## 2023-01-01 RX ADMIN — PANTOPRAZOLE SODIUM 40 MG: 40 INJECTION, POWDER, FOR SOLUTION INTRAVENOUS at 10:08

## 2023-01-01 RX ADMIN — INSULIN ASPART 2 UNITS: 100 INJECTION, SOLUTION INTRAVENOUS; SUBCUTANEOUS at 05:09

## 2023-01-01 RX ADMIN — PROPOFOL 20 MCG/KG/MIN: 10 INJECTION, EMULSION INTRAVENOUS at 10:08

## 2023-01-01 RX ADMIN — AMIODARONE HYDROCHLORIDE 1 MG/MIN: 1.8 INJECTION, SOLUTION INTRAVENOUS at 11:09

## 2023-01-01 RX ADMIN — EPINEPHRINE 0.04 MCG/KG/MIN: 1 INJECTION INTRAMUSCULAR; INTRAVENOUS; SUBCUTANEOUS at 08:08

## 2023-01-01 RX ADMIN — EPINEPHRINE 1 MG: 0.1 INJECTION INTRAVENOUS at 09:09

## 2023-01-01 RX ADMIN — EPINEPHRINE 0.06 MCG/KG/MIN: 1 INJECTION INTRAMUSCULAR; INTRAVENOUS; SUBCUTANEOUS at 07:09

## 2023-01-01 RX ADMIN — PANTOPRAZOLE SODIUM 40 MG: 40 TABLET, DELAYED RELEASE ORAL at 08:07

## 2023-01-01 RX ADMIN — PROPOFOL 50 MG: 10 INJECTION, EMULSION INTRAVENOUS at 08:08

## 2023-01-01 RX ADMIN — CARVEDILOL 12.5 MG: 12.5 TABLET, FILM COATED ORAL at 09:08

## 2023-01-01 RX ADMIN — TRANEXAMIC ACID 1 MG/KG/HR: 100 INJECTION INTRAVENOUS at 08:08

## 2023-01-01 RX ADMIN — CLOPIDOGREL BISULFATE 75 MG: 75 TABLET ORAL at 10:08

## 2023-01-01 RX ADMIN — Medication 30 MG: at 04:08

## 2023-01-01 RX ADMIN — NITROGLYCERIN 2.5 MCG: 5 INJECTION, SOLUTION INTRAVENOUS at 12:08

## 2023-01-01 RX ADMIN — OXYBUTYNIN CHLORIDE 5 MG: 5 TABLET, EXTENDED RELEASE ORAL at 08:07

## 2023-01-01 RX ADMIN — EPINEPHRINE 50 MCG: 0.1 INJECTION INTRAVENOUS at 08:08

## 2023-01-01 RX ADMIN — LIDOCAINE HYDROCHLORIDE 3 MG: 20 INJECTION, SOLUTION INFILTRATION; PERINEURAL at 01:02

## 2023-01-01 RX ADMIN — ALBUMIN (HUMAN) 25 G: 12.5 SOLUTION INTRAVENOUS at 07:08

## 2023-01-01 RX ADMIN — PROTAMINE SULFATE 5 MG: 10 INJECTION, SOLUTION INTRAVENOUS at 02:08

## 2023-01-01 RX ADMIN — MAGNESIUM SULFATE 2 G: 2 INJECTION INTRAVENOUS at 11:07

## 2023-01-01 RX ADMIN — DIAZEPAM 10 MG: 5 TABLET ORAL at 12:07

## 2023-01-01 RX ADMIN — ATORVASTATIN CALCIUM 80 MG: 20 TABLET, FILM COATED ORAL at 08:07

## 2023-01-01 RX ADMIN — SODIUM CHLORIDE, SODIUM GLUCONATE, SODIUM ACETATE, POTASSIUM CHLORIDE, MAGNESIUM CHLORIDE, SODIUM PHOSPHATE, DIBASIC, AND POTASSIUM PHOSPHATE: .53; .5; .37; .037; .03; .012; .00082 INJECTION, SOLUTION INTRAVENOUS at 09:08

## 2023-01-01 RX ADMIN — HEPARIN SODIUM 12 UNITS/KG/HR: 10000 INJECTION, SOLUTION INTRAVENOUS at 10:07

## 2023-01-01 RX ADMIN — MAGNESIUM SULFATE 2 G: 2 INJECTION INTRAVENOUS at 08:07

## 2023-01-01 RX ADMIN — PROPOFOL 30 MG: 10 INJECTION, EMULSION INTRAVENOUS at 12:08

## 2023-01-01 RX ADMIN — CALCIUM CHLORIDE INJECTION 1 G: 100 INJECTION, SOLUTION INTRAVENOUS at 09:09

## 2023-01-01 RX ADMIN — PANTOPRAZOLE SODIUM 40 MG: 40 TABLET, DELAYED RELEASE ORAL at 10:07

## 2023-01-01 RX ADMIN — PANTOPRAZOLE SODIUM 40 MG: 40 INJECTION, POWDER, FOR SOLUTION INTRAVENOUS at 09:09

## 2023-01-01 RX ADMIN — HYDROMORPHONE HYDROCHLORIDE 0.5 MG: 0.5 INJECTION, SOLUTION INTRAMUSCULAR; INTRAVENOUS; SUBCUTANEOUS at 02:09

## 2023-01-01 RX ADMIN — IPRATROPIUM BROMIDE AND ALBUTEROL SULFATE 3 ML: .5; 3 SOLUTION RESPIRATORY (INHALATION) at 11:08

## 2023-01-01 RX ADMIN — ALBUMIN (HUMAN) 25 G: 12.5 SOLUTION INTRAVENOUS at 05:08

## 2023-01-01 RX ADMIN — PROTAMINE SULFATE 185 MG: 10 INJECTION, SOLUTION INTRAVENOUS at 02:08

## 2023-01-01 RX ADMIN — CEFAZOLIN 2 G: 330 INJECTION, POWDER, FOR SOLUTION INTRAMUSCULAR; INTRAVENOUS at 09:08

## 2023-01-01 RX ADMIN — HYDROMORPHONE HYDROCHLORIDE 0.5 MG: 0.5 INJECTION, SOLUTION INTRAMUSCULAR; INTRAVENOUS; SUBCUTANEOUS at 05:09

## 2023-01-01 RX ADMIN — ALBUTEROL SULFATE 2.5 MG: 2.5 SOLUTION RESPIRATORY (INHALATION) at 04:09

## 2023-01-01 RX ADMIN — OXYCODONE HYDROCHLORIDE 5 MG: 5 TABLET ORAL at 04:09

## 2023-01-01 RX ADMIN — HEPARIN SODIUM 17 UNITS/KG/HR: 10000 INJECTION, SOLUTION INTRAVENOUS at 01:07

## 2023-01-01 RX ADMIN — CLOPIDOGREL BISULFATE 75 MG: 75 TABLET ORAL at 09:08

## 2023-01-01 RX ADMIN — MILRINONE LACTATE IN DEXTROSE 0.38 MCG/KG/MIN: 200 INJECTION, SOLUTION INTRAVENOUS at 02:08

## 2023-01-01 RX ADMIN — ACETAMINOPHEN 1000 MG: 500 TABLET ORAL at 05:09

## 2023-01-01 RX ADMIN — OXYBUTYNIN CHLORIDE 5 MG: 5 TABLET, EXTENDED RELEASE ORAL at 09:08

## 2023-01-01 RX ADMIN — EPINEPHRINE 20 MCG: 0.1 INJECTION INTRAVENOUS at 10:09

## 2023-01-01 RX ADMIN — TRANEXAMIC ACID 850 MG: 100 INJECTION INTRAVENOUS at 09:08

## 2023-01-01 RX ADMIN — ATORVASTATIN CALCIUM 80 MG: 20 TABLET, FILM COATED ORAL at 10:08

## 2023-01-01 RX ADMIN — CEFAZOLIN 2 G: 2 INJECTION, POWDER, FOR SOLUTION INTRAMUSCULAR; INTRAVENOUS at 04:09

## 2023-01-01 RX ADMIN — DOBUTAMINE IN DEXTROSE 5 MCG/KG/MIN: 400 INJECTION, SOLUTION INTRAVENOUS at 10:08

## 2023-01-01 RX ADMIN — VASOPRESSIN 0.04 UNITS/MIN: 20 INJECTION INTRAVENOUS at 01:08

## 2023-01-01 RX ADMIN — BETAMETHASONE SODIUM PHOSPHATE AND BETAMETHASONE ACETATE 12 MG: 3; 3 INJECTION, SUSPENSION INTRA-ARTICULAR; INTRALESIONAL; INTRAMUSCULAR; SOFT TISSUE at 01:02

## 2023-01-01 RX ADMIN — GABAPENTIN 400 MG: 250 SOLUTION ORAL at 05:09

## 2023-01-01 RX ADMIN — IPRATROPIUM BROMIDE AND ALBUTEROL SULFATE 3 ML: .5; 3 SOLUTION RESPIRATORY (INHALATION) at 07:08

## 2023-01-01 RX ADMIN — Medication 20 MG: at 08:08

## 2023-01-01 RX ADMIN — POTASSIUM CHLORIDE 40 MEQ: 1500 TABLET, EXTENDED RELEASE ORAL at 09:07

## 2023-01-01 RX ADMIN — ASPIRIN 81 MG: 81 TABLET, COATED ORAL at 08:07

## 2023-01-01 RX ADMIN — ALBUMIN HUMAN 25 G: 50 SOLUTION INTRAVENOUS at 05:08

## 2023-01-01 RX ADMIN — CARVEDILOL 12.5 MG: 12.5 TABLET, FILM COATED ORAL at 12:07

## 2023-01-01 RX ADMIN — CEFAZOLIN 2 G: 2 INJECTION, POWDER, FOR SOLUTION INTRAMUSCULAR; INTRAVENOUS at 09:08

## 2023-01-01 RX ADMIN — ETOMIDATE 8 MG: 2 INJECTION, SOLUTION INTRAVENOUS at 08:08

## 2023-01-01 RX ADMIN — DIPHENHYDRAMINE HYDROCHLORIDE 50 MG: 50 CAPSULE ORAL at 01:07

## 2023-01-01 RX ADMIN — METHOCARBAMOL 500 MG: 500 TABLET ORAL at 06:08

## 2023-01-01 RX ADMIN — SUGAMMADEX 400 MG: 100 INJECTION, SOLUTION INTRAVENOUS at 04:08

## 2023-01-01 RX ADMIN — DIPHENHYDRAMINE HYDROCHLORIDE 50 MG: 25 CAPSULE ORAL at 12:07

## 2023-01-01 RX ADMIN — EPINEPHRINE 30 MCG: 0.1 INJECTION INTRAVENOUS at 10:09

## 2023-01-01 RX ADMIN — OXYCODONE HYDROCHLORIDE 10 MG: 10 TABLET ORAL at 05:08

## 2023-01-01 RX ADMIN — ATORVASTATIN CALCIUM 80 MG: 20 TABLET, FILM COATED ORAL at 11:07

## 2023-01-01 RX ADMIN — Medication 1000 MCG: at 11:09

## 2023-01-01 RX ADMIN — IPRATROPIUM BROMIDE AND ALBUTEROL SULFATE 3 ML: .5; 3 SOLUTION RESPIRATORY (INHALATION) at 04:08

## 2023-01-01 RX ADMIN — AMIODARONE HYDROCHLORIDE 150 MG: 1.5 INJECTION, SOLUTION INTRAVENOUS at 10:09

## 2023-01-01 RX ADMIN — HEPARIN SODIUM 24000 UNITS: 1000 INJECTION, SOLUTION INTRAVENOUS; SUBCUTANEOUS at 12:08

## 2023-01-01 RX ADMIN — MILRINONE LACTATE IN DEXTROSE 860 MCG: 200 INJECTION, SOLUTION INTRAVENOUS at 02:08

## 2023-01-01 RX ADMIN — NITROGLYCERIN 5 MCG: 5 INJECTION, SOLUTION INTRAVENOUS at 12:08

## 2023-01-01 RX ADMIN — ACETAMINOPHEN 1000 MG: 500 TABLET ORAL at 09:08

## 2023-01-01 RX ADMIN — FUROSEMIDE 40 MG: 40 TABLET ORAL at 08:07

## 2023-01-01 RX ADMIN — HYDROMORPHONE HYDROCHLORIDE 0.5 MG: 0.5 INJECTION, SOLUTION INTRAMUSCULAR; INTRAVENOUS; SUBCUTANEOUS at 10:08

## 2023-01-01 RX ADMIN — IPRATROPIUM BROMIDE AND ALBUTEROL SULFATE 3 ML: .5; 3 SOLUTION RESPIRATORY (INHALATION) at 03:08

## 2023-01-01 RX ADMIN — MAGNESIUM SULFATE HEPTAHYDRATE 2 G: 40 INJECTION, SOLUTION INTRAVENOUS at 04:09

## 2023-01-01 RX ADMIN — ROCURONIUM BROMIDE 100 MG: 10 INJECTION, SOLUTION INTRAVENOUS at 08:08

## 2023-01-01 RX ADMIN — NOREPINEPHRINE BITARTRATE 0.06 MCG/KG/MIN: 4 INJECTION, SOLUTION INTRAVENOUS at 12:09

## 2023-01-01 RX ADMIN — FUROSEMIDE 15 MG/HR: 10 INJECTION, SOLUTION INTRAMUSCULAR; INTRAVENOUS at 05:09

## 2023-01-01 RX ADMIN — NOREPINEPHRINE BITARTRATE 0.05 MCG/KG/MIN: 4 INJECTION, SOLUTION INTRAVENOUS at 11:09

## 2023-01-01 RX ADMIN — FUROSEMIDE 20 MG: 10 INJECTION, SOLUTION INTRAMUSCULAR; INTRAVENOUS at 08:09

## 2023-01-01 RX ADMIN — ASPIRIN 81 MG 81 MG: 81 TABLET ORAL at 08:07

## 2023-01-01 RX ADMIN — CEFAZOLIN 2 G: 330 INJECTION, POWDER, FOR SOLUTION INTRAMUSCULAR; INTRAVENOUS at 01:08

## 2023-01-01 RX ADMIN — HYDROMORPHONE HYDROCHLORIDE 0.5 MG: 0.5 INJECTION, SOLUTION INTRAMUSCULAR; INTRAVENOUS; SUBCUTANEOUS at 11:08

## 2023-01-01 RX ADMIN — CALCIUM CHLORIDE 250 G: 100 INJECTION, SOLUTION INTRAVENOUS at 03:08

## 2023-01-01 RX ADMIN — POLYETHYLENE GLYCOL 3350 17 G: 17 POWDER, FOR SOLUTION ORAL at 09:08

## 2023-01-01 RX ADMIN — METHOCARBAMOL 500 MG: 500 TABLET ORAL at 01:08

## 2023-01-01 RX ADMIN — MIDAZOLAM HYDROCHLORIDE 2 MG: 1 INJECTION INTRAMUSCULAR; INTRAVENOUS at 08:08

## 2023-01-01 RX ADMIN — PANTOPRAZOLE SODIUM 40 MG: 40 TABLET, DELAYED RELEASE ORAL at 09:08

## 2023-01-01 RX ADMIN — BUPIVACAINE HYDROCHLORIDE 20 ML: 7.5 INJECTION, SOLUTION EPIDURAL; RETROBULBAR at 09:08

## 2023-01-01 RX ADMIN — HEPARIN SODIUM 17 UNITS/KG/HR: 10000 INJECTION, SOLUTION INTRAVENOUS at 08:07

## 2023-01-01 RX ADMIN — GABAPENTIN 400 MG: 250 SOLUTION ORAL at 09:08

## 2023-01-01 RX ADMIN — HEPARIN SODIUM 17 UNITS/KG/HR: 10000 INJECTION, SOLUTION INTRAVENOUS at 10:07

## 2023-01-01 RX ADMIN — FERROUS SULFATE TAB 325 MG (65 MG ELEMENTAL FE) 1 EACH: 325 (65 FE) TAB at 08:07

## 2023-01-01 RX ADMIN — MAGNESIUM SULFATE HEPTAHYDRATE 4 G: 40 INJECTION, SOLUTION INTRAVENOUS at 08:08

## 2023-01-01 RX ADMIN — INSULIN HUMAN 1 UNITS/HR: 1 INJECTION, SOLUTION INTRAVENOUS at 05:08

## 2023-01-01 RX ADMIN — FENTANYL CITRATE 50 MCG: 50 INJECTION, SOLUTION INTRAMUSCULAR; INTRAVENOUS at 04:08

## 2023-01-01 RX ADMIN — ACETAMINOPHEN 1000 MG: 500 TABLET ORAL at 02:08

## 2023-01-01 RX ADMIN — DIAZEPAM 2 MG: 2 TABLET ORAL at 01:07

## 2023-01-01 RX ADMIN — POTASSIUM CHLORIDE 40 MEQ: 1500 TABLET, EXTENDED RELEASE ORAL at 12:07

## 2023-01-01 RX ADMIN — ALBUMIN HUMAN 25 G: 50 SOLUTION INTRAVENOUS at 10:09

## 2023-01-01 RX ADMIN — ROCURONIUM BROMIDE 20 MG: 10 INJECTION, SOLUTION INTRAVENOUS at 03:08

## 2023-01-01 RX ADMIN — ASPIRIN 81 MG 81 MG: 81 TABLET ORAL at 10:07

## 2023-01-01 RX ADMIN — METHOCARBAMOL 500 MG: 500 TABLET ORAL at 05:08

## 2023-01-01 RX ADMIN — Medication 0.15 MCG/KG/MIN: at 10:08

## 2023-01-01 RX ADMIN — PROPOFOL 50 MCG/KG/MIN: 10 INJECTION, EMULSION INTRAVENOUS at 03:08

## 2023-01-01 RX ADMIN — ROCURONIUM BROMIDE 20 MG: 10 INJECTION, SOLUTION INTRAVENOUS at 12:08

## 2023-01-01 RX ADMIN — LIDOCAINE HYDROCHLORIDE 100 MG: 20 INJECTION, SOLUTION EPIDURAL; INFILTRATION; INTRACAUDAL; PERINEURAL at 08:08

## 2023-01-01 RX ADMIN — VALSARTAN 40 MG: 40 TABLET, FILM COATED ORAL at 11:07

## 2023-01-01 RX ADMIN — SODIUM CHLORIDE: 9 INJECTION, SOLUTION INTRAVENOUS at 05:07

## 2023-01-01 RX ADMIN — MUPIROCIN 1 G: 20 OINTMENT TOPICAL at 09:08

## 2023-01-01 RX ADMIN — MUPIROCIN 1 G: 20 OINTMENT TOPICAL at 07:08

## 2023-01-01 RX ADMIN — FERROUS SULFATE TAB 325 MG (65 MG ELEMENTAL FE) 1 EACH: 325 (65 FE) TAB at 09:08

## 2023-01-01 RX ADMIN — ALBUTEROL SULFATE 2.5 MG: 2.5 SOLUTION RESPIRATORY (INHALATION) at 07:09

## 2023-01-01 RX ADMIN — DEXMEDETOMIDINE HYDROCHLORIDE 0.2 MCG/KG/HR: 4 INJECTION INTRAVENOUS at 08:08

## 2023-01-01 RX ADMIN — HEPARIN SODIUM 17 UNITS/KG/HR: 10000 INJECTION, SOLUTION INTRAVENOUS at 07:07

## 2023-01-01 RX ADMIN — MUPIROCIN 1 G: 20 OINTMENT TOPICAL at 09:09

## 2023-01-01 RX ADMIN — SODIUM BICARBONATE 50 MEQ: 84 INJECTION INTRAVENOUS at 09:09

## 2023-01-01 RX ADMIN — AMIODARONE HYDROCHLORIDE 1 MG/MIN: 1.8 INJECTION, SOLUTION INTRAVENOUS at 03:09

## 2023-01-01 RX ADMIN — SODIUM CHLORIDE: 9 INJECTION, SOLUTION INTRAVENOUS at 09:08

## 2023-01-01 RX ADMIN — Medication 40 MG: at 03:09

## 2023-01-01 RX ADMIN — POTASSIUM CHLORIDE 20 MEQ: 1500 TABLET, EXTENDED RELEASE ORAL at 05:07

## 2023-01-01 RX ADMIN — VALSARTAN 40 MG: 40 TABLET, FILM COATED ORAL at 08:07

## 2023-01-01 RX ADMIN — MAGNESIUM SULFATE HEPTAHYDRATE 4 G: 40 INJECTION, SOLUTION INTRAVENOUS at 09:07

## 2023-01-01 RX ADMIN — Medication 0.04 MCG/KG/MIN: at 06:08

## 2023-01-01 RX ADMIN — Medication 0.04 MCG/KG/MIN: at 04:08

## 2023-01-01 RX ADMIN — PROPOFOL 30 MCG/KG/MIN: 10 INJECTION, EMULSION INTRAVENOUS at 04:08

## 2023-01-01 RX ADMIN — FUROSEMIDE 40 MG: 10 INJECTION, SOLUTION INTRAMUSCULAR; INTRAVENOUS at 06:07

## 2023-01-01 RX ADMIN — ALBUMIN (HUMAN) 12.5 G: 12.5 SOLUTION INTRAVENOUS at 09:08

## 2023-01-01 RX ADMIN — VASOPRESSIN 1 UNITS: 20 INJECTION INTRAVENOUS at 09:09

## 2023-01-01 RX ADMIN — ACETAMINOPHEN 1000 MG: 500 TABLET ORAL at 05:08

## 2023-01-01 RX ADMIN — MILRINONE LACTATE IN DEXTROSE 0.25 MCG/KG/MIN: 200 INJECTION, SOLUTION INTRAVENOUS at 02:08

## 2023-01-01 RX ADMIN — ASPIRIN 325 MG ORAL TABLET 325 MG: 325 PILL ORAL at 07:08

## 2023-01-01 RX ADMIN — HEPARIN SODIUM 5000 UNITS: 1000 INJECTION, SOLUTION INTRAVENOUS; SUBCUTANEOUS at 12:08

## 2023-01-01 RX ADMIN — FENTANYL CITRATE 100 MCG: 50 INJECTION, SOLUTION INTRAMUSCULAR; INTRAVENOUS at 08:08

## 2023-01-01 RX ADMIN — METOPROLOL TARTRATE 25 MG: 25 TABLET, FILM COATED ORAL at 07:08

## 2023-01-01 RX ADMIN — CEFAZOLIN 2 G: 2 INJECTION, POWDER, FOR SOLUTION INTRAMUSCULAR; INTRAVENOUS at 04:08

## 2023-01-01 RX ADMIN — INSULIN ASPART 2 UNITS: 100 INJECTION, SOLUTION INTRAVENOUS; SUBCUTANEOUS at 04:09

## 2023-01-01 RX ADMIN — EPINEPHRINE 20 MCG: 0.1 INJECTION INTRAVENOUS at 09:09

## 2023-01-01 RX ADMIN — FERROUS SULFATE TAB 325 MG (65 MG ELEMENTAL FE) 1 EACH: 325 (65 FE) TAB at 10:07

## 2023-01-01 RX ADMIN — INSULIN ASPART 4 UNITS: 100 INJECTION, SOLUTION INTRAVENOUS; SUBCUTANEOUS at 12:09

## 2023-01-01 RX ADMIN — HEPARIN SODIUM 17 UNITS/KG/HR: 10000 INJECTION, SOLUTION INTRAVENOUS at 12:07

## 2023-01-01 RX ADMIN — VASOPRESSIN 0.04 UNITS/MIN: 20 INJECTION INTRAVENOUS at 11:09

## 2023-01-01 RX ADMIN — CEFAZOLIN 2 G: 2 INJECTION, POWDER, FOR SOLUTION INTRAMUSCULAR; INTRAVENOUS at 01:08

## 2023-02-15 NOTE — PROGRESS NOTES
Subjective:    CC: Pain of the Right Knee and Pain (pt was told by Dr Painting that he has a Rt meniscus tear was also referred by Dr Painting. pt ambulating with a cane, pt states having pain today,taking pain meds.starts hurting when he sits to long.)       HPI:  Patient comes in today for his 1st visit.  Patient complains of right knee pain.  Patient states he is currently ambulating with a cane.  He states he does have pain when he is sitting or standing too long.  He is presently with family.  Patient was seen by his PCP, he did have a CT scan demonstrating a meniscus tear, chondrocalcinosis, underlying arthritic changes.    ROS: Refer to HPI for pertinent ROS. All other 12 point systems negative.    Objective:  There were no vitals filed for this visit.     Physical Exam:  The patient is well-nourished, well-developed and in no apparent distress, pleasant and cooperative. Examination of the right lower extremity compartments are soft and warm. Skin is intact. There are no signs or symptoms of DVT or infection. There is a small joint effusion. There is no erythema. Tender to palpation along the mediolateral joint line , right knee range of motion is 5-110. The knee is stable to exam with varus and valgus stressing. Negative anterior and posterior drawer. Negative Lachman´s.  Questionable Rashel's test. Patella grind is positive, Negative for apprehension. Neurovascularly intact distally.  Images: . Images Reviewed and discussed with patient.    Assessment:  1. Medial meniscus tear  - Ambulatory referral/consult to Orthopedics    2. Acute medial meniscal injury of right knee, initial encounter  - Large Joint Aspiration/Injection: R knee    3. Localized osteoarthritis of right knee  - Large Joint Aspiration/Injection: R knee        Plan:  At this time we discussed his physical exam and previous CT findings.  We have discussed various treatment options including conservative and surgical intervention.  We have  discussed the pros and cons.  Tolerated his steroid injection very well to the right  knee, he was given a pamphlet on knee range of motion strengthening exercises.  I would like see back in 4 weeks to see how he is progressing.  We have discussed surgical intervention if needed.    Follow UP: No follow-ups on file.    Large Joint Aspiration/Injection: R knee    Date/Time: 2/15/2023 1:45 PM  Performed by: Ken Crowe MD  Authorized by: Ken Crowe MD     Consent Done?:  Yes (Verbal)  Indications:  Pain  Site marked: the procedure site was marked    Prep: patient was prepped and draped in usual sterile fashion    Approach:  Anterolateral  Location:  Knee  Site:  R knee  Medications:  12 mg betamethasone acetate-betamethasone sodium phosphate 6 mg/mL; 3 mg LIDOcaine HCL 20 mg/ml (2%) 20 mg/mL (2 %)  Patient tolerance:  Patient tolerated the procedure well with no immediate complications

## 2023-02-15 NOTE — PROCEDURES
Large Joint Aspiration/Injection: R knee    Date/Time: 2/15/2023 1:45 PM  Performed by: Ken Crowe MD  Authorized by: Ken Crowe MD     Consent Done?:  Yes (Verbal)  Indications:  Pain  Site marked: the procedure site was marked    Prep: patient was prepped and draped in usual sterile fashion    Approach:  Anterolateral  Location:  Knee  Site:  R knee  Medications:  12 mg betamethasone acetate-betamethasone sodium phosphate 6 mg/mL; 3 mg LIDOcaine HCL 20 mg/ml (2%) 20 mg/mL (2 %)  Patient tolerance:  Patient tolerated the procedure well with no immediate complications

## 2023-03-29 NOTE — PROGRESS NOTES
"Subjective:    CC: Follow-up and Injections of the Right Knee and Follow-up (F/u Rt knee injection 2/15/23 pt states injection lasted a week and a half. pt taking pain meds. pt states no current pain in knee.)       HPI:  Patient returns today for repeat exam.  Patient states overall his right knee is doing much better.  He has been more active.  He is 6 weeks from his previous injection.  We have discussed his previous MRI as well.    ROS: Refer to HPI for pertinent ROS. All other 12 point systems negative.    Objective:  Vitals:    03/29/23 1253   BP: (!) 82/50   BP Location: Left arm   Patient Position: Sitting   BP Method: Large (Automatic)   Pulse: (!) 46   Weight: 79.4 kg (175 lb)   Height: 5' 6" (1.676 m)        Physical Exam:  The patient is well-nourished, well-developed and in no apparent distress, pleasant and cooperative. Examination of the right lower extremity compartments are soft and warm. Skin is intact. There are no signs or symptoms of DVT or infection. There is no obvious joint effusion. There is no erythema. Tender to palpation along the medial joint line , right knee range of motion is 0-110. The knee is stable to exam with varus and valgus stressing. Negative anterior and posterior drawer. Negative Lachman´s.  Negative Rashel's test. Patella grind is positive, Negative for apprehension. Neurovascularly intact distally.    Images:  Previous MRI reviewed. Images Reviewed and discussed with patient.    Assessment:  1. Acute medial meniscal injury of right knee, initial encounter    2. Localized osteoarthritis of right knee        Plan:  At this time we discussed his physical exam and previous imaging findings.  He is healing nicely with conservative treatment, he will continue low-impact activities, like see back in 2 months for repeat exam, possible injection if needed.    Follow UP: No follow-ups on file.              "

## 2023-05-31 NOTE — PROGRESS NOTES
"Subjective:    CC: Follow-up of the Right Knee and Follow-up (R knee inj 2/15/23 - pt states that his knee is not hurting right now. pt had epidural steriod on 5/17/23 - td)       HPI:  Patient returns for repeat evaluation of his right knee.  He states he is no pain about his right knee today.  Last steroid injection was 02/15/2023.  He did also recently had an epidural injection 2 weeks ago.  Currently taking his narcotic pain medication for his back.  Denies any new complaints today.    ROS: Refer to [facility] for pertinent ROS. All other 12 point systems negative.    Objective:  Vitals:    05/31/23 0839   Weight: 79.4 kg (175 lb)   Height: 5' 6" (1.676 m)        Physical Exam:  The patient is well-nourished, well-developed and in no apparent distress, pleasant and cooperative. Examination of the right lower extremity compartments are soft and warm. Skin is intact. There are no signs or symptoms of DVT or infection. There is no joint effusion. There is no erythema. Tender to palpation along the anteromedial joint line , right knee range of motion is 0-125 degrees. The knee is stable to exam with varus and valgus stressing. Negative anterior and posterior drawer. Negative Lachman´s.  Negative Rashel's test. Patella grind is positive, Negative for apprehension. Neurovascularly intact distally.    Images:  Previous MRI. Images Reviewed and discussed with patient.    Assessment:  1. Acute medial meniscal injury of right knee, initial encounter    2. Localized osteoarthritis of right knee        Plan:  Physical exam and previous MRI findings discussed with patient.  He is asymptomatic today.  He will continue low-impact activities and pain medications as needed with appropriate precautions.  Patient understands to call when he is ready for another steroid injection.    Follow UP: Follow up if symptoms worsen or fail to improve.              "

## 2023-07-24 NOTE — PLAN OF CARE
Report given to DONAL Davidson. Questions encouraged and answered. Pt transported of unit in stable condition.

## 2023-07-24 NOTE — SUBJECTIVE & OBJECTIVE
No current facility-administered medications on file prior to encounter.     Current Outpatient Medications on File Prior to Encounter   Medication Sig    atorvastatin (LIPITOR) 80 MG tablet Take 80 mg by mouth.    carvediloL (COREG) 12.5 MG tablet Take 12.5 mg by mouth 2 (two) times daily.    clopidogreL (PLAVIX) 75 mg tablet Take 75 mg by mouth.    ferrous sulfate 324 mg (65 mg iron) TbEC Take 65 mg by mouth.    gabapentin (NEURONTIN) 400 MG capsule Take 400 mg by mouth every 8 (eight) hours.    metFORMIN (GLUCOPHAGE) 500 MG tablet Take 1,000 mg by mouth.    oxyCODONE (ROXICODONE) 15 MG Tab TAKE 1 TABLET BY MOUTH EVERY 8 TO 12 HOURS AS NEEDED FOR FOR PAIN    pantoprazole (PROTONIX) 40 MG tablet Take 40 mg by mouth.    solifenacin (VESICARE) 5 MG tablet Take 5 mg by mouth.    mometasone-formoterol (DULERA) 100-5 mcg/actuation HFAA Inhale into the lungs.    [DISCONTINUED] amLODIPine (NORVASC) 5 MG tablet Take 5 mg by mouth.    [DISCONTINUED] lisinopriL (PRINIVIL,ZESTRIL) 5 MG tablet Take 5 mg by mouth.    [DISCONTINUED] pregabalin (LYRICA) 75 MG capsule Take 75 mg by mouth 2 (two) times daily.       Review of patient's allergies indicates:   Allergen Reactions    Ranolazine Shortness Of Breath     groggy         Past Medical History:   Diagnosis Date    Arthritis     Diabetes mellitus, type 2     Hypertension      History reviewed. No pertinent surgical history.  Family History    None       Tobacco Use    Smoking status: Every Day     Packs/day: 1.00     Types: Cigarettes    Smokeless tobacco: Never   Substance and Sexual Activity    Alcohol use: Yes    Drug use: Never    Sexual activity: Not on file     Review of Systems   Constitutional:  Positive for activity change and fatigue.   HENT: Negative.     Eyes: Negative.    Respiratory:  Positive for shortness of breath.    Cardiovascular:  Negative for chest pain.   Gastrointestinal: Negative.    Endocrine: Negative.    Genitourinary: Negative.    Musculoskeletal:  Negative.    Neurological:  Positive for light-headedness.   Psychiatric/Behavioral: Negative.     Objective:     Vital Signs (Most Recent):  Temp: 98.6 °F (37 °C) (07/24/23 1105)  Pulse: 71 (07/24/23 1800)  Resp: 18 (07/24/23 1800)  BP: (!) 100/59 (07/24/23 1800)  SpO2: (!) 93 % (07/24/23 1800) Vital Signs (24h Range):  Temp:  [98.6 °F (37 °C)] 98.6 °F (37 °C)  Pulse:  [68-80] 71  Resp:  [8-24] 18  SpO2:  [91 %-100 %] 93 %  BP: (100-149)/() 100/59     Weight: 93.1 kg (205 lb 4 oz)  Body mass index is 34.16 kg/m².    SpO2: (!) 93 %        Intake/Output - Last 3 Shifts         07/22 0700 07/23 0659 07/23 0700 07/24 0659 07/24 0700 07/25 0659    Urine (mL/kg/hr)   900    Total Output   900    Net   -900                    Lines/Drains/Airways       Peripheral Intravenous Line  Duration                  Peripheral IV - Single Lumen 07/24/23 1129 20 G Anterior;Left Upper Arm <1 day                     STS Risk Score: not calculated     Physical Exam  Constitutional:       General: He is not in acute distress.  HENT:      Head: Normocephalic and atraumatic.      Mouth/Throat:      Mouth: Mucous membranes are moist.   Eyes:      Conjunctiva/sclera: Conjunctivae normal.   Cardiovascular:      Rate and Rhythm: Normal rate and regular rhythm.   Pulmonary:      Effort: Pulmonary effort is normal.      Breath sounds: Normal breath sounds.   Abdominal:      General: Bowel sounds are normal.      Palpations: Abdomen is soft.   Musculoskeletal:         General: Normal range of motion.      Cervical back: Normal range of motion.   Skin:     General: Skin is warm.   Neurological:      General: No focal deficit present.      Mental Status: He is alert.   Psychiatric:         Mood and Affect: Mood normal.         Behavior: Behavior normal.          Significant Labs:  All pertinent labs from the last 24 hours have been reviewed.    Significant Diagnostics:  I have reviewed all pertinent imaging results/findings within the  past 24 hours.

## 2023-07-24 NOTE — CONSULTS
[facility] - Cath Lab Services  Cardiothoracic Surgery  Consult Note    Patient Name: Jason Barros  MRN: [id number]  Admission Date: 7/24/2023  Attending Physician: Gloria Donald MD  Referring Provider: Gloria Donald MD    Patient information was obtained from patient and ER records.     Consults  Subjective:     Principal Problem: <principal problem not specified>    History of Present Illness: Mr. Barros is a 73yo male with PMH of CAD s/p CAB x 1 to RCA by Dr. Odonnell in 2006, CMO with an EF of 20% s/p AICD, HTN, HLD, DM and h/o tobacco use.  Mr. Barros recently presented to his cardiologist c/o GOMEZ and lightheadedness. Denies chest pain.  Patient had an abnormal perfusion study and underwent a LHC today. LHC revealed severe stenosis in LM, LAD and Cx.  His is on plavix daily. His last dose was this morning.  CV surgery has been consulted for coronary artery bypass grafting.             No current facility-administered medications on file prior to encounter.     Current Outpatient Medications on File Prior to Encounter   Medication Sig    atorvastatin (LIPITOR) 80 MG tablet Take 80 mg by mouth.    carvediloL (COREG) 12.5 MG tablet Take 12.5 mg by mouth 2 (two) times daily.    clopidogreL (PLAVIX) 75 mg tablet Take 75 mg by mouth.    ferrous sulfate 324 mg (65 mg iron) TbEC Take 65 mg by mouth.    gabapentin (NEURONTIN) 400 MG capsule Take 400 mg by mouth every 8 (eight) hours.    metFORMIN (GLUCOPHAGE) 500 MG tablet Take 1,000 mg by mouth.    oxyCODONE (ROXICODONE) 15 MG Tab TAKE 1 TABLET BY MOUTH EVERY 8 TO 12 HOURS AS NEEDED FOR FOR PAIN    pantoprazole (PROTONIX) 40 MG tablet Take 40 mg by mouth.    solifenacin (VESICARE) 5 MG tablet Take 5 mg by mouth.    mometasone-formoterol (DULERA) 100-5 mcg/actuation HFAA Inhale into the lungs.    [DISCONTINUED] amLODIPine (NORVASC) 5 MG tablet Take 5 mg by mouth.    [DISCONTINUED] lisinopriL (PRINIVIL,ZESTRIL) 5 MG tablet Take 5 mg  by mouth.    [DISCONTINUED] pregabalin (LYRICA) 75 MG capsule Take 75 mg by mouth 2 (two) times daily.       Review of patient's allergies indicates:   Allergen Reactions    Ranolazine Shortness Of Breath     groggy         Past Medical History:   Diagnosis Date    Arthritis     Diabetes mellitus, type 2     Hypertension      History reviewed. No pertinent surgical history.  Family History    None       Tobacco Use    Smoking status: Every Day     Packs/day: 1.00     Types: Cigarettes    Smokeless tobacco: Never   Substance and Sexual Activity    Alcohol use: Yes    Drug use: Never    Sexual activity: Not on file     Review of Systems   Constitutional:  Positive for activity change and fatigue.   HENT: Negative.     Eyes: Negative.    Respiratory:  Positive for shortness of breath.    Cardiovascular:  Negative for chest pain.   Gastrointestinal: Negative.    Endocrine: Negative.    Genitourinary: Negative.    Musculoskeletal: Negative.    Neurological:  Positive for light-headedness.   Psychiatric/Behavioral: Negative.     Objective:     Vital Signs (Most Recent):  Temp: 98.6 °F (37 °C) (07/24/23 1105)  Pulse: 71 (07/24/23 1800)  Resp: 18 (07/24/23 1800)  BP: (!) 100/59 (07/24/23 1800)  SpO2: (!) 93 % (07/24/23 1800) Vital Signs (24h Range):  Temp:  [98.6 °F (37 °C)] 98.6 °F (37 °C)  Pulse:  [68-80] 71  Resp:  [8-24] 18  SpO2:  [91 %-100 %] 93 %  BP: (100-149)/() 100/59     Weight: 93.1 kg (205 lb 4 oz)  Body mass index is 34.16 kg/m².    SpO2: (!) 93 %        Intake/Output - Last 3 Shifts         07/22 0700 07/23 0659 07/23 0700 07/24 0659 07/24 0700 07/25 0659    Urine (mL/kg/hr)   900    Total Output   900    Net   -900                    Lines/Drains/Airways       Peripheral Intravenous Line  Duration                  Peripheral IV - Single Lumen 07/24/23 1129 20 G Anterior;Left Upper Arm <1 day                     STS Risk Score: not calculated     Physical Exam  Constitutional:        General: He is not in acute distress.  HENT:      Head: Normocephalic and atraumatic.      Mouth/Throat:      Mouth: Mucous membranes are moist.   Eyes:      Conjunctiva/sclera: Conjunctivae normal.   Cardiovascular:      Rate and Rhythm: Normal rate and regular rhythm.   Pulmonary:      Effort: Pulmonary effort is normal.      Breath sounds: Normal breath sounds.   Abdominal:      General: Bowel sounds are normal.      Palpations: Abdomen is soft.   Musculoskeletal:         General: Normal range of motion.      Cervical back: Normal range of motion.   Skin:     General: Skin is warm.   Neurological:      General: No focal deficit present.      Mental Status: He is alert.   Psychiatric:         Mood and Affect: Mood normal.         Behavior: Behavior normal.          Significant Labs:  All pertinent labs from the last 24 hours have been reviewed.    Significant Diagnostics:  I have reviewed all pertinent imaging results/findings within the past 24 hours.      Assessment/Plan:    Severe multivessel CAD  S/p CABG x 1 (RCA) in 2006  CHF, EF 20%  HTN  DM    Will await optimal treatement of CHF and plavix washout.  Dr. Holliday will follow and reevaluate for timing of surgery later in the week.     Thank you for the consultation.     NYHA Score: per cardiology    No notes have been filed under this hospital service.  Service: Cardiothoracic Surgery      Thank you for your consult. I will follow-up with patient. Please contact us if you have any additional questions.    MARTINA Perez  Cardiothoracic Surgery  [facility] - Cath Lab Services

## 2023-07-24 NOTE — Clinical Note
The catheter was inserted into the, was removed from the and was inserted over the wire into the left ventricle LV. Hemodynamics were performed.

## 2023-07-24 NOTE — Clinical Note
The catheter was inserted into the, was removed from the and was inserted over the wire into the ostium   right coronary artery. Hemodynamics were performed.  An angiography was performed of the right coronary arteries. Multiple views were taken. The angiography was performed via power injection.

## 2023-07-24 NOTE — PROGRESS NOTES
Cardiovascular surgery-initial note  Full consult to follow    This is a 72-year-old male who has been complaining of increasing shortness of breath for the past several months.  His past medical history is significant for single coronary bypass to his RCA in 206.  His ejection fraction is known to have varied from 40% a few months ago which was up from 20% several years ago.  His most recent ejection fraction is now at 20% once again having decreased since May.  He also has an implanted AICD.  He has been on chronic Plavix therapy  Today's left heart catheterization identifies a high-grade distal left main stenosis with a proximal LAD stenosis and a mid OM stenosis.  The right coronary graft is widely patent.    Heart-regular rate and rhythm without murmur rub or gallop  Lungs-clear  Abdomen-benign    Recommendation-aggressive medical therapy for his ischemic cardiomyopathy.  Allow for Plavix washout.  We will re-evaluate on a daily basis when to schedule his surgery.

## 2023-07-24 NOTE — HPI
Mr. Barros is a 71yo male with PMH of CAD s/p CAB x 1 to RCA by Dr. Odonnell in 2006, CMO with an EF of 20% s/p AICD, HTN, HLD, DM and h/o tobacco use.  Mr. Barros recently presented to his cardiologist c/o GOMEZ and lightheadedness. Denies chest pain.  Patient had an abnormal perfusion study and underwent a LHC today. LHC revealed severe stenosis in LM, LAD and Cx.  His is on plavix daily. His last dose was this morning.  CV surgery has been consulted for coronary artery bypass grafting.

## 2023-07-25 NOTE — NURSING
Nurses Note -- 4 Eyes      7/24/2023   7:17 PM      Skin assessed during: Admit      [x] No Altered Skin Integrity Present    [x]Prevention Measures Documented      [] Yes- Altered Skin Integrity Present or Discovered   [] LDA Added if Not in Epic (Describe Wound)   [] New Altered Skin Integrity was Present on Admit and Documented in LDA   [] Wound Image Taken    Wound Care Consulted? No    Attending Nurse:  Stuart Harris RN     Second RN/Staff Member:  Rica ALEX

## 2023-07-25 NOTE — PROGRESS NOTES
[facility] - [facility] Medical Telemetry    Cardiology  Progress Note    Patient Name: Jason Barros  MRN: [id number]  Admission Date: 7/24/2023  Hospital Length of Stay: 1 days  Code Status: No Order   Attending Physician: Gloria Donald MD   Primary Care Physician: Kingsley Painting Jr, MD  Expected Discharge Date:   Principal Problem:<principal problem not specified>    Subjective:     Brief HPI:   Mr. Barros is a 72 year old male who is known to [facility], Dr. Donald. He presents to [facility] for a scheduled outpatient LHC. He was found to have severe native triple-vessel coronary artery disease including left main disease. CT surgery was consulted to evaluate for CABG with recommendations of diuresis & plavix washout. [facility] has admitted the patient to manage his ICMO  & MV CAD.     Hospital Course:   7.25.23: NAD. Denies current CP, SOB, or palps. Right groin benign.     PMH: PSVT, HTN, PAD, ICMO, systolic CHF, CAD, HLD, DM 2, bladder CA  PSH: colon resection, CABG, penile prosthesis   Family History: Father - CA; Mother - VHD  Social History: Current every day smoker. Denies alcohol or illicit drug use.     Previous Cardiac Diagnostics:   LHC 7.24.23:  Left Main:     - Mid LM to Dist LM lesion was 90% stenosed. The lesion was calcified. The calcification amount was severe.  LAD:    - Prox LAD-1 lesion was 80% stenosed.    - Previously placed LAD-2 stent (unknown type) is widely patent.    - Mid LAD lesion was 60% stenosed.    - Previously placed Dist LAD stent (unknown type) is widely patent  Left Circumflex:    - Mid Cx lesion was 90% stenosed. The lesion was 100% stenosed. The lesion was previously treated using a stent of unknown type.  Saphenous Graft to Dist RCA    - Conduit type: SVG. The graft was visualized by angiography and was moderate in size. The graft was angiographically normal.     PET 6.23.23:  This is an abnormal perfusion study.   This scan is suggestive of low risk for future  cardiovascular events.   Small fixed perfusion abnormality of mild intensity in the apical segment. Small fixed perfusion abnormality of mild intensity in the apical anterior segment.   The left ventricular cavity is noted to be moderately enlarged on the stress studies. The stress left ventricular ejection fraction was calculated to be 24% and left ventricular global function is severely reduced. The rest left ventricular cavity is noted to be mildly enlarged. The rest left ventricular ejection fraction was calculated to be 23% and rest left ventricular global function is severely reduced.   When compared to the resting ejection fraction (23%), the stress ejection fraction (24%) has increased.   The study quality is good.   There was a rise in myocardial blood flow between rest and stress.  Global myocardial blood flow reserve was 1.46.  Myocardial blood flow reserve is globally abnormal, placing the patient at a higher coronary event risk.    TTE 5.9.23:  The study quality is average.   Global left ventricular systolic function is mildly decreased. The left ventricular ejection fraction is 40%.   Mild (1+) mitral regurgitation.  The pulmonary artery systolic pressure is 18 mmHg.     Review of Systems   Cardiovascular:  Negative for chest pain and palpitations.   Respiratory:  Positive for shortness of breath.      Objective:     Vital Signs (Most Recent):  Temp: 98 °F (36.7 °C) (07/25/23 0735)  Pulse: 75 (07/25/23 0735)  Resp: 18 (07/25/23 0735)  BP: 130/79 (07/25/23 0735)  SpO2: (!) 93 % (07/25/23 0735) Vital Signs (24h Range):  Temp:  [97.5 °F (36.4 °C)-98.6 °F (37 °C)] 98 °F (36.7 °C)  Pulse:  [68-82] 75  Resp:  [8-24] 18  SpO2:  [91 %-100 %] 93 %  BP: (100-149)/() 130/79     Weight: 89.2 kg (196 lb 9.6 oz)  Body mass index is 32.72 kg/m².    SpO2: (!) 93 %         Intake/Output Summary (Last 24 hours) at 7/25/2023 8415  Last data filed at 7/25/2023 0702  Gross per 24 hour   Intake 240 ml   Output 1200 ml    Net -960 ml       Lines/Drains/Airways       Peripheral Intravenous Line  Duration                  Peripheral IV - Single Lumen 07/24/23 1129 20 G Anterior;Left Upper Arm <1 day                    Significant Labs:   Recent Results (from the past 72 hour(s))   POCT glucose    Collection Time: 07/24/23 11:34 AM   Result Value Ref Range    POCT Glucose 111 (H) 70 - 110 mg/dL   Cardiac catheterization    Collection Time: 07/24/23  2:06 PM   Result Value Ref Range    Cath EF Quantitative 20 %   Basic Metabolic Panel    Collection Time: 07/24/23  7:26 PM   Result Value Ref Range    Sodium Level 143 136 - 145 mmol/L    Potassium Level 3.8 3.5 - 5.1 mmol/L    Chloride 103 98 - 107 mmol/L    Carbon Dioxide 28 23 - 31 mmol/L    Glucose Level 123 (H) 82 - 115 mg/dL    Blood Urea Nitrogen 13.6 8.4 - 25.7 mg/dL    Creatinine 0.88 0.73 - 1.18 mg/dL    BUN/Creatinine Ratio 15     Calcium Level Total 9.0 8.8 - 10.0 mg/dL    Anion Gap 12.0 mEq/L    eGFR >60 mls/min/1.73/m2   POCT glucose    Collection Time: 07/24/23  8:36 PM   Result Value Ref Range    POCT Glucose 127 (H) 70 - 110 mg/dL       Telemetry:  SR 80's     Physical Exam  HENT:      Head: Normocephalic.      Mouth/Throat:      Mouth: Mucous membranes are moist.   Eyes:      Extraocular Movements: Extraocular movements intact.   Cardiovascular:      Rate and Rhythm: Normal rate and regular rhythm.      Comments: Right groin soft, nontender. No hematoma noted.+2 peripheral pulse   Pulmonary:      Effort: Pulmonary effort is normal.      Comments: Diminished breath sounds bilaterally   Abdominal:      Palpations: Abdomen is soft.   Musculoskeletal:         General: Normal range of motion.   Skin:     General: Skin is warm and dry.   Neurological:      Mental Status: He is alert and oriented to person, place, and time.   Psychiatric:         Behavior: Behavior normal.       Current Inpatient Medications:    Current Facility-Administered Medications:     0.9%  NaCl  infusion, , Intravenous, Once, Gloria Donald MD    0.9%  NaCl infusion, , Intravenous, Continuous, Gloria Donald MD    acetaminophen tablet 650 mg, 650 mg, Oral, Q4H PRN, Gloria Donald MD    aspirin EC tablet 81 mg, 81 mg, Oral, Daily, Gloria Donald MD    diazePAM tablet 10 mg, 10 mg, Oral, On Call Procedure, Gloria Donald MD, 10 mg at 07/24/23 1205    diphenhydrAMINE capsule 50 mg, 50 mg, Oral, On Call Procedure, Gloria Donald MD, 50 mg at 07/24/23 1205    furosemide injection 40 mg, 40 mg, Intravenous, BID, Gloria Donald MD, 40 mg at 07/24/23 2033    hydrALAZINE injection 10 mg, 10 mg, Intravenous, Q4H PRN, Gloria Donald MD    HYDROcodone-acetaminophen 5-325 mg per tablet 1 tablet, 1 tablet, Oral, Q4H PRN, Gloria Donald MD    morphine injection 2 mg, 2 mg, Intravenous, Q4H PRN, Gloria Donald MD    ondansetron disintegrating tablet 8 mg, 8 mg, Oral, Q8H PRN, Gloria Donald MD    VTE Risk Mitigation (From admission, onward)      None            Assessment:   MV CAD    - [facility] 7.24.23: Left main - 90%, Prox LAD-1 - 80%, mid lesion - 60%, Lcx - 90%  Acute on Chronic Systolic CHF  ICMO    - LVEF 25% (7.24.23)  VHD    - mild-mod TR, mild MR  HTN  DM 2  PSVT  PAD  Bladder CA  Tobacco Use    Plan:   Obtain limited echo to reevaluate LVEF.  Obtain carotid US.  Continue ASA, statin, & BB.   Will start Entresto prior to discharge.   Hold Plavix.   Lasix 40 mg IV BID.  Ensure accurate I&O's and daily weights.  Start heparin infusion per protocol.   CTS on board. CABG eval pending.   Keep K > 4 & Mg > 2.   Labs in am: CBC, CMP, & Mg.     RADHA Linares  Cardiology  [facility] - [facility] Medical Telemetry  07/25/2023

## 2023-07-25 NOTE — PROGRESS NOTES
The patient remains stable and pain-free.  He reports having received a dose of Plavix yesterday.  We will check platelet function activity tomorrow and determine the timing of surgery.

## 2023-07-26 NOTE — PROGRESS NOTES
CT SURGERY PROGRESS NOTE  Jason BALBUENA Papo  72 y.o.  1950    Patients Procedure: Procedure(s) (LRB):  Left heart cath (Left)    Subjective  Interval History: NAD    ROS    Medication List  Infusions   sodium chloride 0.9%      heparin (porcine) in D5W 17 Units/kg/hr (07/26/23 0057)     Scheduled   sodium chloride 0.9%   Intravenous Once    aspirin  81 mg Oral Daily    atorvastatin  80 mg Oral QHS    carvediloL  12.5 mg Oral BID    furosemide  40 mg Intravenous BID    pantoprazole  40 mg Oral Daily       Objective:  Recent Vitals:  Temp:  [97.5 °F (36.4 °C)-98.3 °F (36.8 °C)] 97.5 °F (36.4 °C)  Pulse:  [68-81] 71  Resp:  [18-20] 18  SpO2:  [92 %-95 %] 94 %  BP: (108-138)/(61-83) 130/78    Physical Exam     I/O last 24 hrs:  Intake/Output - Last 3 Shifts         07/24 0700 07/25 0659 07/25 0700 07/26 0659 07/26 0700 07/27 0659    P.O.  720     Total Intake(mL/kg)  720 (8.6)     Urine (mL/kg/hr) 900 300 (0.1)     Stool  0     Total Output 900 300     Net -900 +420            Urine Occurrence  6 x     Stool Occurrence  1 x             Labs  BMP:   Recent Labs   Lab 07/26/23  0458      K 3.5   CO2 30   BUN 10.0   CREATININE 0.82   CALCIUM 8.5*   MG 1.00*     Cardiac markers: No results for input(s): CKMB, CPKMB, TROPONINT, TROPONINI, MYOGLOBIN in the last 48 hours.  CBC:   Recent Labs   Lab 07/26/23  0458   WBC 7.97   RBC 3.70*   HGB 11.2*   HCT 34.1*      MCV 92.2   MCH 30.3   MCHC 32.8*     CMP:   Recent Labs   Lab 07/25/23  0856 07/26/23  0458   CALCIUM 8.6* 8.5*   ALBUMIN 3.5  --     139   K 3.7 3.5   CO2 32* 30   BUN 13.7 10.0   CREATININE 0.87 0.82   ALKPHOS 139  --    ALT 34  --    AST 32  --    BILITOT 0.8  --          Imaging:   CXR: No results found in the last 24 hours.        ASSESSMENT/PLAN:    Chk PFA   Hold Plavix  CABG eval in progress    Case and plan of care discussed with MD Guy Almazan PA-C

## 2023-07-26 NOTE — PROGRESS NOTES
[facility] - [facility] Medical Telemetry    Cardiology  Progress Note    Patient Name: Jason Barros  MRN: [id number]  Admission Date: 7/24/2023  Hospital Length of Stay: 2 days  Code Status: No Order   Attending Physician: Gloria Donald MD   Primary Care Physician: Kingsley Painting Jr, MD  Expected Discharge Date:   Principal Problem:<principal problem not specified>    Subjective:     Brief HPI:   Mr. Barros is a 72 year old male who is known to [facility], Dr. Donald. He presents to [facility] for a scheduled outpatient LHC. He was found to have severe native triple-vessel coronary artery disease including left main disease. CT surgery was consulted to evaluate for CABG with recommendations of diuresis & plavix washout. [facility] has admitted the patient to manage his ICMO  & MV CAD.     Hospital Course:   7.25.23: NAD. Denies current CP, SOB, or palps. Right groin benign.   7.26.23: NAD. Improvement in SOB. Denies CP or palps. Inaccurate I&O's and daily weights. Mg 1, K 3.5    PMH: PSVT, HTN, PAD, ICMO, systolic CHF, CAD, HLD, DM 2, bladder CA  PSH: colon resection, CABG, penile prosthesis   Family History: Father - CA; Mother - VHD  Social History: Current every day smoker. Denies alcohol or illicit drug use.     Previous Cardiac Diagnostics:   TTE 7.25.23:  The left ventricle is severely enlarged with severely decreased systolic function.  The estimated ejection fraction is 25%.  Grade I left ventricular diastolic dysfunction.  Mild to moderate tricuspid regurgitation.  Mild pulmonic regurgitation.  Mild mitral regurgitation.  Moderate right ventricular enlargement with moderately to severely reduced right ventricular systolic function.  Mild left atrial enlargement.  Intermediate central venous pressure (8 mmHg).  The estimated PA systolic pressure is 24 mmHg.    Carotid US 7.25.23:  The right internal carotid artery demonstrated less than 50% stenosis.  The left internal carotid artery demonstrated less  than 50% stenosis.  Bilateral vertebral arteries were patent with antegrade flow.    [facility] 7.24.23:  Left Main:     - Mid LM to Dist LM lesion was 90% stenosed. The lesion was calcified. The calcification amount was severe.  LAD:    - Prox LAD-1 lesion was 80% stenosed.    - Previously placed LAD-2 stent (unknown type) is widely patent.    - Mid LAD lesion was 60% stenosed.    - Previously placed Dist LAD stent (unknown type) is widely patent  Left Circumflex:    - Mid Cx lesion was 90% stenosed. The lesion was 100% stenosed. The lesion was previously treated using a stent of unknown type.  Saphenous Graft to Dist RCA    - Conduit type: SVG. The graft was visualized by angiography and was moderate in size. The graft was angiographically normal.     PET 6.23.23:  This is an abnormal perfusion study.   This scan is suggestive of low risk for future cardiovascular events.   Small fixed perfusion abnormality of mild intensity in the apical segment. Small fixed perfusion abnormality of mild intensity in the apical anterior segment.   The left ventricular cavity is noted to be moderately enlarged on the stress studies. The stress left ventricular ejection fraction was calculated to be 24% and left ventricular global function is severely reduced. The rest left ventricular cavity is noted to be mildly enlarged. The rest left ventricular ejection fraction was calculated to be 23% and rest left ventricular global function is severely reduced.   When compared to the resting ejection fraction (23%), the stress ejection fraction (24%) has increased.   The study quality is good.   There was a rise in myocardial blood flow between rest and stress.  Global myocardial blood flow reserve was 1.46.  Myocardial blood flow reserve is globally abnormal, placing the patient at a higher coronary event risk.    TTE 5.9.23:  The study quality is average.   Global left ventricular systolic function is mildly decreased. The left ventricular  ejection fraction is 40%.   Mild (1+) mitral regurgitation.  The pulmonary artery systolic pressure is 18 mmHg.     Review of Systems   Cardiovascular:  Negative for chest pain and palpitations.   Respiratory:  Positive for shortness of breath.      Objective:     Vital Signs (Most Recent):  Temp: 97.5 °F (36.4 °C) (07/26/23 0748)  Pulse: 71 (07/26/23 0854)  Resp: 18 (07/26/23 0748)  BP: 130/78 (07/26/23 0854)  SpO2: (!) 94 % (07/26/23 0748) Vital Signs (24h Range):  Temp:  [97.5 °F (36.4 °C)-98.3 °F (36.8 °C)] 97.5 °F (36.4 °C)  Pulse:  [68-81] 71  Resp:  [18-20] 18  SpO2:  [92 %-95 %] 94 %  BP: (108-138)/(61-83) 130/78     Weight: 84.1 kg (185 lb 6.5 oz)  Body mass index is 30.85 kg/m².    SpO2: (!) 94 %         Intake/Output Summary (Last 24 hours) at 7/26/2023 0958  Last data filed at 7/25/2023 1441  Gross per 24 hour   Intake 480 ml   Output --   Net 480 ml         Lines/Drains/Airways       Peripheral Intravenous Line  Duration                  Peripheral IV - Single Lumen 07/24/23 1129 20 G Anterior;Left Upper Arm 1 day                    Significant Labs:   Recent Results (from the past 72 hour(s))   POCT glucose    Collection Time: 07/24/23 11:34 AM   Result Value Ref Range    POCT Glucose 111 (H) 70 - 110 mg/dL   Cardiac catheterization    Collection Time: 07/24/23  2:06 PM   Result Value Ref Range    Cath EF Quantitative 20 %   Basic Metabolic Panel    Collection Time: 07/24/23  7:26 PM   Result Value Ref Range    Sodium Level 143 136 - 145 mmol/L    Potassium Level 3.8 3.5 - 5.1 mmol/L    Chloride 103 98 - 107 mmol/L    Carbon Dioxide 28 23 - 31 mmol/L    Glucose Level 123 (H) 82 - 115 mg/dL    Blood Urea Nitrogen 13.6 8.4 - 25.7 mg/dL    Creatinine 0.88 0.73 - 1.18 mg/dL    BUN/Creatinine Ratio 15     Calcium Level Total 9.0 8.8 - 10.0 mg/dL    Anion Gap 12.0 mEq/L    eGFR >60 mls/min/1.73/m2   POCT glucose    Collection Time: 07/24/23  8:36 PM   Result Value Ref Range    POCT Glucose 127 (H) 70 - 110  mg/dL   BNP    Collection Time: 07/25/23  8:56 AM   Result Value Ref Range    Natriuretic Peptide 2,850.5 (H) <=100.0 pg/mL   Comprehensive Metabolic Panel    Collection Time: 07/25/23  8:56 AM   Result Value Ref Range    Sodium Level 140 136 - 145 mmol/L    Potassium Level 3.7 3.5 - 5.1 mmol/L    Chloride 101 98 - 107 mmol/L    Carbon Dioxide 32 (H) 23 - 31 mmol/L    Glucose Level 100 82 - 115 mg/dL    Blood Urea Nitrogen 13.7 8.4 - 25.7 mg/dL    Creatinine 0.87 0.73 - 1.18 mg/dL    Calcium Level Total 8.6 (L) 8.8 - 10.0 mg/dL    Protein Total 6.3 5.8 - 7.6 gm/dL    Albumin Level 3.5 3.4 - 4.8 g/dL    Globulin 2.8 2.4 - 3.5 gm/dL    Albumin/Globulin Ratio 1.3 1.1 - 2.0 ratio    Bilirubin Total 0.8 <=1.5 mg/dL    Alkaline Phosphatase 139 40 - 150 unit/L    Alanine Aminotransferase 34 0 - 55 unit/L    Aspartate Aminotransferase 32 5 - 34 unit/L    eGFR >60 mls/min/1.73/m2   CBC with Differential    Collection Time: 07/25/23  8:56 AM   Result Value Ref Range    WBC 6.76 4.50 - 11.50 x10(3)/mcL    RBC 3.59 (L) 4.70 - 6.10 x10(6)/mcL    Hgb 11.0 (L) 14.0 - 18.0 g/dL    Hct 33.8 (L) 42.0 - 52.0 %    MCV 94.2 (H) 80.0 - 94.0 fL    MCH 30.6 27.0 - 31.0 pg    MCHC 32.5 (L) 33.0 - 36.0 g/dL    RDW 16.9 11.5 - 17.0 %    Platelet 170 130 - 400 x10(3)/mcL    MPV 10.8 (H) 7.4 - 10.4 fL    Neut % 63.7 %    Lymph % 21.7 %    Mono % 11.1 %    Eos % 2.7 %    Basophil % 0.7 %    Lymph # 1.47 0.6 - 4.6 x10(3)/mcL    Neut # 4.30 2.1 - 9.2 x10(3)/mcL    Mono # 0.75 0.1 - 1.3 x10(3)/mcL    Eos # 0.18 0 - 0.9 x10(3)/mcL    Baso # 0.05 <=0.2 x10(3)/mcL    IG# 0.01 0 - 0.04 x10(3)/mcL    IG% 0.1 %    NRBC% 0.0 %   CV Ultrasound Bilateral Doppler Carotid    Collection Time: 07/25/23  9:07 AM   Result Value Ref Range    Left ICA/CCA ratio 1.48     Right ICA/CCA ratio 2.26     Left Highest ICA 74.00     Left Highest CCA 77     Right Highest .00     Right Highest CCA 60     Right Highest EDV 33.00     LT Highest EDV 18.00     Right CCA  prox sys 60 cm/s    Right CCA prox eddy 10 cm/s    Right CCA dist sys 50 cm/s    Right CCA dist eddy 9 cm/s    Right ICA prox sys 92 cm/s    Right ICA prox eddy 12 cm/s    Right ICA mid sys 113 cm/s    Right ICA mid eddy 33 cm/s    Right ICA dist sys 73 cm/s    Right ICA dist eddy 12 cm/s    Right ECA sys 84 cm/s    Right vertebral sys 31 cm/s    Left CCA prox sys 77 cm/s    Left CCA prox eddy 8 cm/s    Left CCA dist sys 50 cm/s    Left CCA dist eddy 11 cm/s    Left ICA prox sys 74 cm/s    Left ICA prox eddy 13 cm/s    Left ICA mid sys 69 cm/s    Left ICA mid eddy 15 cm/s    Left ICA dist sys 64 cm/s    Left ICA dist eddy 18 cm/s    Left ECA sys 61 cm/s    Left vertebral sys 32 cm/s    Right vertebral eddy 3 cm/s    Right ECA eddy 6 cm/s    Left vertebral eddy 4 cm/s    Left ECA eddy 9 cm/s   Echo    Collection Time: 07/25/23  9:21 AM   Result Value Ref Range    BSA 2.07 m2    TDI SEPTAL 0.04 m/s    LV LATERAL E/E' RATIO 6.83 m/s    LV SEPTAL E/E' RATIO 20.50 m/s    Right Atrial Pressure (from IVC) 8 mmHg    EF 25 %    Left Ventricular Outflow Tract Mean Velocity 0.37 cm/s    Left Ventricular Outflow Tract Mean Gradient 1.00 mmHg    TDI LATERAL 0.12 m/s    PV PEAK VELOCITY 1.17 cm/s    LVIDd 5.98 3.5 - 6.0 cm    IVS 0.87 0.6 - 1.1 cm    Posterior Wall 0.82 0.6 - 1.1 cm    LVIDs 5.20 (A) 2.1 - 4.0 cm    FS 13 28 - 44 %    LV mass 198.09 g    LA size 4.10 cm    RVDD 5.15 cm    TAPSE 0.97 cm    Left Ventricle Relative Wall Thickness 0.27 cm    AV mean gradient 4 mmHg    AV Velocity Ratio 0.44     AV index (prosthetic) 0.41     E/A ratio 0.85     Mean e' 0.08 m/s    E wave deceleration time 143.00 msec    LVOT peak flako 0.60 m/s    LVOT peak VTI 10.20 cm    Ao peak flako 1.37 m/s    Ao VTI 24.7 cm    AV peak gradient 8 mmHg    TV rest pulmonary artery pressure 24 mmHg    E/E' ratio 10.25 m/s    MV Peak E Flako 0.82 m/s    TR Max Flako 1.98 m/s    MV Peak A Flako 0.97 m/s    LV Systolic Volume 130.00 mL    LV Systolic Volume  Index 66.3 mL/m2    LV Diastolic Volume 179.00 mL    LV Diastolic Volume Index 91.33 mL/m2    LV Mass Index 101 g/m2    Triscuspid Valve Regurgitation Peak Gradient 16 mmHg    LA Volume Index (Mod) 39.6 mL/m2    LA volume (mod) 77.60 cm3    Sanchez's Biplane MOD Ejection Fraction 2 %   APTT    Collection Time: 07/25/23 10:04 AM   Result Value Ref Range    PTT 28.4 23.2 - 33.7 seconds   Protime-INR    Collection Time: 07/25/23 10:04 AM   Result Value Ref Range    PT 16.1 (H) 12.5 - 14.5 seconds    INR 1.30 0.00 - 1.30   POCT glucose    Collection Time: 07/25/23 10:17 AM   Result Value Ref Range    POCT Glucose 171 (H) 70 - 110 mg/dL   PTT Heparin Monitoring    Collection Time: 07/25/23  4:45 PM   Result Value Ref Range    PTT Heparin Monitor 34.4 (H) 23.2 - 33.7 seconds   POCT glucose    Collection Time: 07/25/23  6:07 PM   Result Value Ref Range    POCT Glucose 222 (H) 70 - 110 mg/dL   POCT glucose    Collection Time: 07/25/23  8:46 PM   Result Value Ref Range    POCT Glucose 109 70 - 110 mg/dL   PTT Heparin Monitoring    Collection Time: 07/26/23 12:14 AM   Result Value Ref Range    PTT Heparin Monitor 59.5 (H) 23.2 - 33.7 seconds   APTT    Collection Time: 07/26/23  4:58 AM   Result Value Ref Range    PTT 76.8 (H) 23.2 - 33.7 seconds   Basic Metabolic Panel    Collection Time: 07/26/23  4:58 AM   Result Value Ref Range    Sodium Level 139 136 - 145 mmol/L    Potassium Level 3.5 3.5 - 5.1 mmol/L    Chloride 100 98 - 107 mmol/L    Carbon Dioxide 30 23 - 31 mmol/L    Glucose Level 114 82 - 115 mg/dL    Blood Urea Nitrogen 10.0 8.4 - 25.7 mg/dL    Creatinine 0.82 0.73 - 1.18 mg/dL    BUN/Creatinine Ratio 12     Calcium Level Total 8.5 (L) 8.8 - 10.0 mg/dL    Anion Gap 9.0 mEq/L    eGFR >60 mls/min/1.73/m2   Magnesium    Collection Time: 07/26/23  4:58 AM   Result Value Ref Range    Magnesium Level 1.00 (L) 1.60 - 2.60 mg/dL   CBC with Differential    Collection Time: 07/26/23  4:58 AM   Result Value Ref Range    WBC  7.97 4.50 - 11.50 x10(3)/mcL    RBC 3.70 (L) 4.70 - 6.10 x10(6)/mcL    Hgb 11.2 (L) 14.0 - 18.0 g/dL    Hct 34.1 (L) 42.0 - 52.0 %    MCV 92.2 80.0 - 94.0 fL    MCH 30.3 27.0 - 31.0 pg    MCHC 32.8 (L) 33.0 - 36.0 g/dL    RDW 17.0 11.5 - 17.0 %    Platelet 189 130 - 400 x10(3)/mcL    MPV 10.9 (H) 7.4 - 10.4 fL    Neut % 62.5 %    Lymph % 22.0 %    Mono % 12.4 %    Eos % 2.1 %    Basophil % 0.6 %    Lymph # 1.75 0.6 - 4.6 x10(3)/mcL    Neut # 4.98 2.1 - 9.2 x10(3)/mcL    Mono # 0.99 0.1 - 1.3 x10(3)/mcL    Eos # 0.17 0 - 0.9 x10(3)/mcL    Baso # 0.05 <=0.2 x10(3)/mcL    IG# 0.03 0 - 0.04 x10(3)/mcL    IG% 0.4 %    NRBC% 0.0 %   Platelet Function Assay    Collection Time: 07/26/23  8:37 AM   Result Value Ref Range    Col/ADP      Col/Epi 106 68 - 183 seconds       Telemetry:  SR 80's     Physical Exam  HENT:      Head: Normocephalic.      Mouth/Throat:      Mouth: Mucous membranes are moist.   Eyes:      Extraocular Movements: Extraocular movements intact.   Cardiovascular:      Rate and Rhythm: Normal rate and regular rhythm.      Comments: Right groin soft, nontender. No hematoma noted.+2 peripheral pulse   Pulmonary:      Effort: Pulmonary effort is normal.      Breath sounds: Rales present.      Comments: Diminished breath sounds bilaterally   Abdominal:      Palpations: Abdomen is soft.   Musculoskeletal:         General: Normal range of motion.   Skin:     General: Skin is warm and dry.   Neurological:      Mental Status: He is alert and oriented to person, place, and time.   Psychiatric:         Behavior: Behavior normal.       Current Inpatient Medications:    Current Facility-Administered Medications:     0.9%  NaCl infusion, , Intravenous, Once, Gloria Donald MD    0.9%  NaCl infusion, , Intravenous, Continuous, Gloria Donald MD    acetaminophen tablet 650 mg, 650 mg, Oral, Q4H PRN, Gloria Donald MD    aspirin EC tablet 81 mg, 81 mg, Oral, Daily, Gloria Donald MD, 81 mg at 07/26/23 0884     atorvastatin tablet 80 mg, 80 mg, Oral, QHS, RADHA Linares, 80 mg at 07/25/23 2036    carvediloL tablet 12.5 mg, 12.5 mg, Oral, BID, RADHA Linares, 12.5 mg at 07/26/23 0854    diazePAM tablet 10 mg, 10 mg, Oral, On Call Procedure, Gloria Donald MD, 10 mg at 07/24/23 1205    diphenhydrAMINE capsule 50 mg, 50 mg, Oral, On Call Procedure, Gloria Donald MD, 50 mg at 07/24/23 1205    furosemide injection 40 mg, 40 mg, Intravenous, BID, Gloria Donald MD, 40 mg at 07/26/23 0853    glucagon (human recombinant) injection 1 mg, 1 mg, Intramuscular, PRN, MARTINA Deluna    glucose chewable tablet 16 g, 16 g, Oral, PRN, MARTINA Deluna    glucose chewable tablet 24 g, 24 g, Oral, PRN, MARTINA Deluna    heparin 25,000 units in dextrose 5% (100 units/ml) IV bolus from bag - ADDITIONAL PRN BOLUS - 30 units/kg (max bolus 4000 units), 30 Units/kg (Adjusted), Intravenous, PRN, RADHA Linares    heparin 25,000 units in dextrose 5% (100 units/ml) IV bolus from bag - ADDITIONAL PRN BOLUS - 60 units/kg (max bolus 4000 units), 55.1 Units/kg (Adjusted), Intravenous, PRN, RADHA Linares    heparin 25,000 units in dextrose 5% 250 mL (100 units/mL) infusion LOW INTENSITY nomogram - [facility], 0-40 Units/kg/hr (Adjusted), Intravenous, Continuous, RADHA Linares, Last Rate: 12.3 mL/hr at 07/26/23 0057, 17 Units/kg/hr at 07/26/23 0057    hydrALAZINE injection 10 mg, 10 mg, Intravenous, Q4H PRN, Gloria Donald MD    HYDROcodone-acetaminophen 5-325 mg per tablet 1 tablet, 1 tablet, Oral, Q4H PRN, Gloria Donald MD    insulin aspart U-100 injection 0-5 Units, 0-5 Units, Subcutaneous, QID (AC + HS) PRN, MARTINA Deluna, 2 Units at 07/25/23 1908    magnesium sulfate 2g in water 50mL IVPB (premix), 4 g, Intravenous, Once, Shadia Hull, RADHA, Last Rate: 50 mL/hr at 07/26/23 0947, 4 g at 07/26/23 0947    morphine injection 2 mg, 2 mg, Intravenous, Q4H PRN, Gloria Donald MD     ondansetron disintegrating tablet 8 mg, 8 mg, Oral, Q8H PRN, Gloria Donald MD    pantoprazole EC tablet 40 mg, 40 mg, Oral, Daily, RADHA Linares, 40 mg at 07/26/23 0854    potassium chloride SA CR tablet 20 mEq, 20 mEq, Oral, Once, RADHA Linares    VTE Risk Mitigation (From admission, onward)           Ordered     heparin 25,000 units in dextrose 5% (100 units/ml) IV bolus from bag - ADDITIONAL PRN BOLUS - 60 units/kg (max bolus 4000 units)  As needed (PRN)        Question:  Heparin Infusion Adjustment (DO NOT MODIFY ANSWER)  Answer:  [email or web address]\Images\Pharmacy\HeparinInfusions\heparin LOW INTENSITY nomogram for [facility] [id number]    07/25/23 0952     heparin 25,000 units in dextrose 5% (100 units/ml) IV bolus from bag - ADDITIONAL PRN BOLUS - 30 units/kg (max bolus 4000 units)  As needed (PRN)        Question:  Heparin Infusion Adjustment (DO NOT MODIFY ANSWER)  Answer:  [email or web address]\Images\Pharmacy\HeparinInfusions\heparin LOW INTENSITY nomogram for [facility] [id number].pdf    07/25/23 0952     heparin 25,000 units in dextrose 5% 250 mL (100 units/mL) infusion LOW INTENSITY nomogram - [facility]  Continuous        Question Answer Comment   Begin at (in units/kg/hr) 12    Heparin Infusion Adjustment (DO NOT MODIFY ANSWER) [email or web address]\epic\Images\Pharmacy\HeparinInfusions\heparin LOW INTENSITY nomogram for [facility] [id number].pdf        07/25/23 0952                    Assessment:   MV CAD    - [facility] 7.24.23: Left main - 90%, Prox LAD-1 - 80%, mid lesion - 60%, Lcx - 90%  Acute on Chronic Systolic CHF  Electrolyte Derangements    - Hypomagnesemia    - Hypokalemia   ICMO    - LVEF 25% (7.24.23)  VHD    - mild-mod TR, mild MR  HTN  DM 2  PSVT  PAD  Bladder CA  Tobacco Use    Plan:   ECHO reviewed.   Continue ASA, statin, & BB.   Will start Entresto prior to discharge.   Hold Plavix.   Continue Lasix 40 mg IV BID.  Ensure accurate I&O's and daily weights.  Continue heparin infusion per protocol.   CTS on board.  CABG eval pending.   Replete K & Mg. Recheck Mg later this afternoon.   Keep K > 4 & Mg > 2.   Labs in am: CBC, BMP, & Mg.     RADHA Linares  Cardiology  [facility] - [facility] Medical Telemetry  07/26/2023

## 2023-07-26 NOTE — PROGRESS NOTES
"Inpatient Nutrition Evaluation    Admit Date: 2023   Total duration of encounter: 2 days    Nutrition Recommendation/Prescription     -Continue heart-healthy + diabetic diet as ordered and tolerated.     Nutrition Assessment     Chart Review    Reason Seen: malnutrition screening tool (MST)    Malnutrition Screening Tool Results   Have you recently lost weight without trying?: Yes: 24-33 lbs  Have you been eating poorly because of a decreased appetite?: Yes   MST Score: 4     Diagnosis:  MV CAD  Acute on Chronic Systolic CHF  ICMO  VHD    Relevant Medical History:   Past Medical History:   Diagnosis Date    Arthritis     Diabetes mellitus, type 2     Hypertension       Nutrition-Related Medications: furosemide, pantoprazole, SSI    Nutrition-Related Labs:  23 Ca 8.5, Mg 1    Diet Order: Diet heart healthy Diabetic  Oral Supplement Order: none  Appetite/Oral Intake: good/% of meals  Factors Affecting Nutritional Intake: none identified  Food/Gnosticism/Cultural Preferences: none reported  Food Allergies: none reported       Wound(s):       Comments    23  Reports good appetite and intake of meals. Denies any unintentional wt loss. States UBW ~185lbs, will fluctuate with fluid. No GI complaints.     Anthropometrics    Height: 5' 5" (165.1 cm) Height Method: Stated  Last Weight: 84.1 kg (185 lb 6.5 oz) (23 0500) Weight Method: Standard Scale  BMI (Calculated): 30.9  BMI Classification: obese grade I (BMI 30-34.9)        Ideal Body Weight (IBW), Male: 136 lb     % Ideal Body Weight, Male (lb): 150.92 %                 Usual Body Weight (UBW), k kg  % Usual Body Weight: 100.33     Usual Weight Provided By: patient    Wt Readings from Last 5 Encounters:   23 84.1 kg (185 lb 6.5 oz)   23 79.4 kg (175 lb)   23 79.4 kg (175 lb)   18 86.7 kg (191 lb 2.2 oz)     Weight Change(s) Since Admission:  Admit Weight: 93.1 kg (205 lb 4 oz) (23 1105)    Patient " Education    Not applicable.    Monitoring & Evaluation     Dietitian will monitor energy intake.  Nutrition Risk/Follow-Up: low (follow-up in 5-7 days)  Patients assigned 'low nutrition risk' status do not qualify for a full nutritional assessment but will be monitored and re-evaluated in a 5-7 day time period. Please consult if re-evaluation needed sooner.

## 2023-07-27 NOTE — PROGRESS NOTES
CT SURGERY PROGRESS NOTE  Jason BALBUENA Papo  72 y.o.  1950    Patients Procedure: Procedure(s) (LRB):  Left heart cath (Left)    Subjective  Interval History: NAD    ROS    Medication List  Infusions   sodium chloride 0.9%      heparin (porcine) in D5W 17 Units/kg/hr (07/27/23 0841)     Scheduled   sodium chloride 0.9%   Intravenous Once    aspirin  81 mg Oral Daily    atorvastatin  80 mg Oral QHS    carvediloL  12.5 mg Oral BID    furosemide  40 mg Intravenous BID    pantoprazole  40 mg Oral Daily       Objective:  Recent Vitals:  Temp:  [97.3 °F (36.3 °C)-98.2 °F (36.8 °C)] 98.2 °F (36.8 °C)  Pulse:  [67-80] 72  Resp:  [18-20] 20  SpO2:  [91 %-97 %] 92 %  BP: ()/(62-81) 114/71    Physical Exam     I/O last 24 hrs:  Intake/Output - Last 3 Shifts         07/25 0700 07/26 0659 07/26 0700 07/27 0659 07/27 0700 07/28 0659    P.O. 720 600     Total Intake(mL/kg) 720 (8.6) 600 (6.6)     Urine (mL/kg/hr) 300 (0.1) 1900 (0.9)     Stool 0 1     Total Output 300 1901     Net +420 -1301            Urine Occurrence 6 x      Stool Occurrence 1 x              Labs  BMP:   Recent Labs   Lab 07/27/23  0439      K 4.1   CO2 27   BUN 11.1   CREATININE 0.86   CALCIUM 8.7*   MG 1.60       Cardiac markers: No results for input(s): CKMB, CPKMB, TROPONINT, TROPONINI, MYOGLOBIN in the last 48 hours.  CBC:   Recent Labs   Lab 07/27/23  0439   WBC 7.73   RBC 3.82*   HGB 11.5*   HCT 35.9*      MCV 94.0   MCH 30.1   MCHC 32.0*       CMP:   Recent Labs   Lab 07/25/23  0856 07/26/23  0458 07/27/23  0439   CALCIUM 8.6*   < > 8.7*   ALBUMIN 3.5  --   --       < > 138   K 3.7   < > 4.1   CO2 32*   < > 27   BUN 13.7   < > 11.1   CREATININE 0.87   < > 0.86   ALKPHOS 139  --   --    ALT 34  --   --    AST 32  --   --    BILITOT 0.8  --   --     < > = values in this interval not displayed.           Imaging:   CXR: No results found in the last 24 hours.        ASSESSMENT/PLAN:    PFA is wnl   Hold Plavix  CABG Friday  Dr Holliday     Case and plan of care discussed with MD Guy Almazan, PA-C

## 2023-07-27 NOTE — PROGRESS NOTES
[facility] - [facility] Medical Telemetry    Cardiology  Progress Note    Patient Name: Jason Barros  MRN: [id number]  Admission Date: 7/24/2023  Hospital Length of Stay: 3 days  Code Status: Full Code   Attending Physician: Gloria Donald MD   Primary Care Physician: Kingsley Painting Jr, MD  Expected Discharge Date:   Principal Problem:<principal problem not specified>    Subjective:     Brief HPI:   Mr. Barros is a 72 year old male who is known to [facility], Dr. Donald. He presents to [facility] for a scheduled outpatient LHC. He was found to have severe native triple-vessel coronary artery disease including left main disease. CT surgery was consulted to evaluate for CABG with recommendations of diuresis & plavix washout. [facility] has admitted the patient to manage his ICMO  & MV CAD.     Hospital Course:   7.25.23: NAD. Denies current CP, SOB, or palps. Right groin benign.   7.26.23: NAD. Improvement in SOB. Denies CP or palps. Inaccurate I&O's and daily weights. Mg 1, K 3.5.  7.27.23: NAD Noted. Vitals Stable. SR on Tele. On Heparin Infusion. Denies Angina. Awaiting CABG in Near Future.     PMH: PSVT, HTN, PAD, ICMO, systolic CHF, CAD, HLD, DM 2, bladder CA  PSH: colon resection, CABG, penile prosthesis   Family History: Father - CA; Mother - VHD  Social History: Current every day smoker. Denies alcohol or illicit drug use.     Previous Cardiac Diagnostics:   TTE (7.25.23):  The left ventricle is severely enlarged with severely decreased systolic function.  The estimated ejection fraction is 25%.  Grade I left ventricular diastolic dysfunction.  Mild to moderate tricuspid regurgitation.  Mild pulmonic regurgitation.  Mild mitral regurgitation.  Moderate right ventricular enlargement with moderately to severely reduced right ventricular systolic function.  Mild left atrial enlargement.  Intermediate central venous pressure (8 mmHg).  The estimated PA systolic pressure is 24 mmHg.    Carotid US (7.25.23):  The  right internal carotid artery demonstrated less than 50% stenosis.  The left internal carotid artery demonstrated less than 50% stenosis.  Bilateral vertebral arteries were patent with antegrade flow.    LHC (7.24.23):  Left Main:     - Mid LM to Dist LM lesion was 90% stenosed. The lesion was calcified. The calcification amount was severe.  LAD:    - Prox LAD-1 lesion was 80% stenosed.    - Previously placed LAD-2 stent (unknown type) is widely patent.    - Mid LAD lesion was 60% stenosed.    - Previously placed Dist LAD stent (unknown type) is widely patent  Left Circumflex:    - Mid Cx lesion was 90% stenosed. The lesion was 100% stenosed. The lesion was previously treated using a stent of unknown type.  Saphenous Graft to Dist RCA    - Conduit type: SVG. The graft was visualized by angiography and was moderate in size. The graft was angiographically normal.     PET (6.23.23):  This is an abnormal perfusion study.   This scan is suggestive of low risk for future cardiovascular events.   Small fixed perfusion abnormality of mild intensity in the apical segment. Small fixed perfusion abnormality of mild intensity in the apical anterior segment.   The left ventricular cavity is noted to be moderately enlarged on the stress studies. The stress left ventricular ejection fraction was calculated to be 24% and left ventricular global function is severely reduced. The rest left ventricular cavity is noted to be mildly enlarged. The rest left ventricular ejection fraction was calculated to be 23% and rest left ventricular global function is severely reduced.   When compared to the resting ejection fraction (23%), the stress ejection fraction (24%) has increased.   The study quality is good.   There was a rise in myocardial blood flow between rest and stress.  Global myocardial blood flow reserve was 1.46.  Myocardial blood flow reserve is globally abnormal, placing the patient at a higher coronary event risk.    TTE  (5.9.23):  The study quality is average.   Global left ventricular systolic function is mildly decreased. The left ventricular ejection fraction is 40%.   Mild (1+) mitral regurgitation.  The pulmonary artery systolic pressure is 18 mmHg.     Review of Systems   Cardiovascular:  Negative for chest pain and palpitations.   Respiratory:  Negative for shortness of breath.      Objective:     Vital Signs (Most Recent):  Temp: 98.2 °F (36.8 °C) (07/27/23 0744)  Pulse: 72 (07/27/23 0829)  Resp: 20 (07/27/23 0339)  BP: 114/71 (07/27/23 0829)  SpO2: (!) 92 % (07/27/23 0744) Vital Signs (24h Range):  Temp:  [97.3 °F (36.3 °C)-98.2 °F (36.8 °C)] 98.2 °F (36.8 °C)  Pulse:  [67-80] 72  Resp:  [18-20] 20  SpO2:  [91 %-97 %] 92 %  BP: ()/(62-81) 114/71     Weight: 91.6 kg (202 lb)  Body mass index is 33.61 kg/m².    SpO2: (!) 92 %         Intake/Output Summary (Last 24 hours) at 7/27/2023 1037  Last data filed at 7/27/2023 0609  Gross per 24 hour   Intake 600 ml   Output 1901 ml   Net -1301 ml         Lines/Drains/Airways       Peripheral Intravenous Line  Duration                  Peripheral IV - Single Lumen 07/26/23 0930 22 G Left Antecubital 1 day                    Significant Labs:   Recent Results (from the past 72 hour(s))   POCT glucose    Collection Time: 07/24/23 11:34 AM   Result Value Ref Range    POCT Glucose 111 (H) 70 - 110 mg/dL   Cardiac catheterization    Collection Time: 07/24/23  2:06 PM   Result Value Ref Range    Cath EF Quantitative 20 %   Basic Metabolic Panel    Collection Time: 07/24/23  7:26 PM   Result Value Ref Range    Sodium Level 143 136 - 145 mmol/L    Potassium Level 3.8 3.5 - 5.1 mmol/L    Chloride 103 98 - 107 mmol/L    Carbon Dioxide 28 23 - 31 mmol/L    Glucose Level 123 (H) 82 - 115 mg/dL    Blood Urea Nitrogen 13.6 8.4 - 25.7 mg/dL    Creatinine 0.88 0.73 - 1.18 mg/dL    BUN/Creatinine Ratio 15     Calcium Level Total 9.0 8.8 - 10.0 mg/dL    Anion Gap 12.0 mEq/L    eGFR >60  mls/min/1.73/m2   POCT glucose    Collection Time: 07/24/23  8:36 PM   Result Value Ref Range    POCT Glucose 127 (H) 70 - 110 mg/dL   BNP    Collection Time: 07/25/23  8:56 AM   Result Value Ref Range    Natriuretic Peptide 2,850.5 (H) <=100.0 pg/mL   Comprehensive Metabolic Panel    Collection Time: 07/25/23  8:56 AM   Result Value Ref Range    Sodium Level 140 136 - 145 mmol/L    Potassium Level 3.7 3.5 - 5.1 mmol/L    Chloride 101 98 - 107 mmol/L    Carbon Dioxide 32 (H) 23 - 31 mmol/L    Glucose Level 100 82 - 115 mg/dL    Blood Urea Nitrogen 13.7 8.4 - 25.7 mg/dL    Creatinine 0.87 0.73 - 1.18 mg/dL    Calcium Level Total 8.6 (L) 8.8 - 10.0 mg/dL    Protein Total 6.3 5.8 - 7.6 gm/dL    Albumin Level 3.5 3.4 - 4.8 g/dL    Globulin 2.8 2.4 - 3.5 gm/dL    Albumin/Globulin Ratio 1.3 1.1 - 2.0 ratio    Bilirubin Total 0.8 <=1.5 mg/dL    Alkaline Phosphatase 139 40 - 150 unit/L    Alanine Aminotransferase 34 0 - 55 unit/L    Aspartate Aminotransferase 32 5 - 34 unit/L    eGFR >60 mls/min/1.73/m2   CBC with Differential    Collection Time: 07/25/23  8:56 AM   Result Value Ref Range    WBC 6.76 4.50 - 11.50 x10(3)/mcL    RBC 3.59 (L) 4.70 - 6.10 x10(6)/mcL    Hgb 11.0 (L) 14.0 - 18.0 g/dL    Hct 33.8 (L) 42.0 - 52.0 %    MCV 94.2 (H) 80.0 - 94.0 fL    MCH 30.6 27.0 - 31.0 pg    MCHC 32.5 (L) 33.0 - 36.0 g/dL    RDW 16.9 11.5 - 17.0 %    Platelet 170 130 - 400 x10(3)/mcL    MPV 10.8 (H) 7.4 - 10.4 fL    Neut % 63.7 %    Lymph % 21.7 %    Mono % 11.1 %    Eos % 2.7 %    Basophil % 0.7 %    Lymph # 1.47 0.6 - 4.6 x10(3)/mcL    Neut # 4.30 2.1 - 9.2 x10(3)/mcL    Mono # 0.75 0.1 - 1.3 x10(3)/mcL    Eos # 0.18 0 - 0.9 x10(3)/mcL    Baso # 0.05 <=0.2 x10(3)/mcL    IG# 0.01 0 - 0.04 x10(3)/mcL    IG% 0.1 %    NRBC% 0.0 %   CV Ultrasound Bilateral Doppler Carotid    Collection Time: 07/25/23  9:07 AM   Result Value Ref Range    Left ICA/CCA ratio 1.48     Right ICA/CCA ratio 2.26     Left Highest ICA 74.00     Left Highest  CCA 77     Right Highest .00     Right Highest CCA 60     Right Highest EDV 33.00     LT Highest EDV 18.00     Right CCA prox sys 60 cm/s    Right CCA prox eddy 10 cm/s    Right CCA dist sys 50 cm/s    Right CCA dist eddy 9 cm/s    Right ICA prox sys 92 cm/s    Right ICA prox eddy 12 cm/s    Right ICA mid sys 113 cm/s    Right ICA mid eddy 33 cm/s    Right ICA dist sys 73 cm/s    Right ICA dist eddy 12 cm/s    Right ECA sys 84 cm/s    Right vertebral sys 31 cm/s    Left CCA prox sys 77 cm/s    Left CCA prox eddy 8 cm/s    Left CCA dist sys 50 cm/s    Left CCA dist eddy 11 cm/s    Left ICA prox sys 74 cm/s    Left ICA prox eddy 13 cm/s    Left ICA mid sys 69 cm/s    Left ICA mid eddy 15 cm/s    Left ICA dist sys 64 cm/s    Left ICA dist eddy 18 cm/s    Left ECA sys 61 cm/s    Left vertebral sys 32 cm/s    Right vertebral eddy 3 cm/s    Right ECA eddy 6 cm/s    Left vertebral eddy 4 cm/s    Left ECA eddy 9 cm/s   Echo    Collection Time: 07/25/23  9:21 AM   Result Value Ref Range    BSA 2.07 m2    TDI SEPTAL 0.04 m/s    LV LATERAL E/E' RATIO 6.83 m/s    LV SEPTAL E/E' RATIO 20.50 m/s    Right Atrial Pressure (from IVC) 8 mmHg    EF 25 %    Left Ventricular Outflow Tract Mean Velocity 0.37 cm/s    Left Ventricular Outflow Tract Mean Gradient 1.00 mmHg    TDI LATERAL 0.12 m/s    PV PEAK VELOCITY 1.17 cm/s    LVIDd 5.98 3.5 - 6.0 cm    IVS 0.87 0.6 - 1.1 cm    Posterior Wall 0.82 0.6 - 1.1 cm    LVIDs 5.20 (A) 2.1 - 4.0 cm    FS 13 28 - 44 %    LV mass 198.09 g    LA size 4.10 cm    RVDD 5.15 cm    TAPSE 0.97 cm    Left Ventricle Relative Wall Thickness 0.27 cm    AV mean gradient 4 mmHg    AV Velocity Ratio 0.44     AV index (prosthetic) 0.41     E/A ratio 0.85     Mean e' 0.08 m/s    E wave deceleration time 143.00 msec    LVOT peak arnoldo 0.60 m/s    LVOT peak VTI 10.20 cm    Ao peak arnoldo 1.37 m/s    Ao VTI 24.7 cm    AV peak gradient 8 mmHg    TV rest pulmonary artery pressure 24 mmHg    E/E' ratio 10.25 m/s    MV  Peak E Flako 0.82 m/s    TR Max Flako 1.98 m/s    MV Peak A Flako 0.97 m/s    LV Systolic Volume 130.00 mL    LV Systolic Volume Index 66.3 mL/m2    LV Diastolic Volume 179.00 mL    LV Diastolic Volume Index 91.33 mL/m2    LV Mass Index 101 g/m2    Triscuspid Valve Regurgitation Peak Gradient 16 mmHg    LA Volume Index (Mod) 39.6 mL/m2    LA volume (mod) 77.60 cm3    Sanchez's Biplane MOD Ejection Fraction 2 %   APTT    Collection Time: 07/25/23 10:04 AM   Result Value Ref Range    PTT 28.4 23.2 - 33.7 seconds   Protime-INR    Collection Time: 07/25/23 10:04 AM   Result Value Ref Range    PT 16.1 (H) 12.5 - 14.5 seconds    INR 1.30 0.00 - 1.30   POCT glucose    Collection Time: 07/25/23 10:17 AM   Result Value Ref Range    POCT Glucose 171 (H) 70 - 110 mg/dL   PTT Heparin Monitoring    Collection Time: 07/25/23  4:45 PM   Result Value Ref Range    PTT Heparin Monitor 34.4 (H) 23.2 - 33.7 seconds   POCT glucose    Collection Time: 07/25/23  6:07 PM   Result Value Ref Range    POCT Glucose 222 (H) 70 - 110 mg/dL   POCT glucose    Collection Time: 07/25/23  8:46 PM   Result Value Ref Range    POCT Glucose 109 70 - 110 mg/dL   PTT Heparin Monitoring    Collection Time: 07/26/23 12:14 AM   Result Value Ref Range    PTT Heparin Monitor 59.5 (H) 23.2 - 33.7 seconds   APTT    Collection Time: 07/26/23  4:58 AM   Result Value Ref Range    PTT 76.8 (H) 23.2 - 33.7 seconds   Basic Metabolic Panel    Collection Time: 07/26/23  4:58 AM   Result Value Ref Range    Sodium Level 139 136 - 145 mmol/L    Potassium Level 3.5 3.5 - 5.1 mmol/L    Chloride 100 98 - 107 mmol/L    Carbon Dioxide 30 23 - 31 mmol/L    Glucose Level 114 82 - 115 mg/dL    Blood Urea Nitrogen 10.0 8.4 - 25.7 mg/dL    Creatinine 0.82 0.73 - 1.18 mg/dL    BUN/Creatinine Ratio 12     Calcium Level Total 8.5 (L) 8.8 - 10.0 mg/dL    Anion Gap 9.0 mEq/L    eGFR >60 mls/min/1.73/m2   Magnesium    Collection Time: 07/26/23  4:58 AM   Result Value Ref Range    Magnesium Level  1.00 (L) 1.60 - 2.60 mg/dL   CBC with Differential    Collection Time: 07/26/23  4:58 AM   Result Value Ref Range    WBC 7.97 4.50 - 11.50 x10(3)/mcL    RBC 3.70 (L) 4.70 - 6.10 x10(6)/mcL    Hgb 11.2 (L) 14.0 - 18.0 g/dL    Hct 34.1 (L) 42.0 - 52.0 %    MCV 92.2 80.0 - 94.0 fL    MCH 30.3 27.0 - 31.0 pg    MCHC 32.8 (L) 33.0 - 36.0 g/dL    RDW 17.0 11.5 - 17.0 %    Platelet 189 130 - 400 x10(3)/mcL    MPV 10.9 (H) 7.4 - 10.4 fL    Neut % 62.5 %    Lymph % 22.0 %    Mono % 12.4 %    Eos % 2.1 %    Basophil % 0.6 %    Lymph # 1.75 0.6 - 4.6 x10(3)/mcL    Neut # 4.98 2.1 - 9.2 x10(3)/mcL    Mono # 0.99 0.1 - 1.3 x10(3)/mcL    Eos # 0.17 0 - 0.9 x10(3)/mcL    Baso # 0.05 <=0.2 x10(3)/mcL    IG# 0.03 0 - 0.04 x10(3)/mcL    IG% 0.4 %    NRBC% 0.0 %   Platelet Function Assay    Collection Time: 07/26/23  8:37 AM   Result Value Ref Range    Col/ADP      Col/Epi 106 68 - 183 seconds   POCT glucose    Collection Time: 07/26/23 11:32 AM   Result Value Ref Range    POCT Glucose 128 (H) 70 - 110 mg/dL   PTT Heparin Monitoring    Collection Time: 07/26/23  1:04 PM   Result Value Ref Range    PTT Heparin Monitor 92.0 (H) 23.2 - 33.7 seconds   Magnesium    Collection Time: 07/26/23  1:04 PM   Result Value Ref Range    Magnesium Level 1.90 1.60 - 2.60 mg/dL   POCT glucose    Collection Time: 07/26/23  8:37 PM   Result Value Ref Range    POCT Glucose 110 70 - 110 mg/dL   POCT glucose    Collection Time: 07/26/23  9:04 PM   Result Value Ref Range    POC Glucose 110 70 - 110 MG/DL   APTT    Collection Time: 07/27/23  4:39 AM   Result Value Ref Range    .6 (H) 23.2 - 33.7 seconds   Type & Screen    Collection Time: 07/27/23  4:39 AM   Result Value Ref Range    Group & Rh A POS     Indirect Anupama GEL NEG     Specimen Outdate 07/30/2023 23:59    Basic Metabolic Panel    Collection Time: 07/27/23  4:39 AM   Result Value Ref Range    Sodium Level 138 136 - 145 mmol/L    Potassium Level 4.1 3.5 - 5.1 mmol/L    Chloride 99 98 - 107  mmol/L    Carbon Dioxide 27 23 - 31 mmol/L    Glucose Level 111 82 - 115 mg/dL    Blood Urea Nitrogen 11.1 8.4 - 25.7 mg/dL    Creatinine 0.86 0.73 - 1.18 mg/dL    BUN/Creatinine Ratio 13     Calcium Level Total 8.7 (L) 8.8 - 10.0 mg/dL    Anion Gap 12.0 mEq/L    eGFR >60 mls/min/1.73/m2   Magnesium    Collection Time: 07/27/23  4:39 AM   Result Value Ref Range    Magnesium Level 1.60 1.60 - 2.60 mg/dL   CBC with Differential    Collection Time: 07/27/23  4:39 AM   Result Value Ref Range    WBC 7.73 4.50 - 11.50 x10(3)/mcL    RBC 3.82 (L) 4.70 - 6.10 x10(6)/mcL    Hgb 11.5 (L) 14.0 - 18.0 g/dL    Hct 35.9 (L) 42.0 - 52.0 %    MCV 94.0 80.0 - 94.0 fL    MCH 30.1 27.0 - 31.0 pg    MCHC 32.0 (L) 33.0 - 36.0 g/dL    RDW 16.9 11.5 - 17.0 %    Platelet 186 130 - 400 x10(3)/mcL    MPV 10.3 7.4 - 10.4 fL    Neut % 61.0 %    Lymph % 23.7 %    Mono % 12.0 %    Eos % 2.6 %    Basophil % 0.6 %    Lymph # 1.83 0.6 - 4.6 x10(3)/mcL    Neut # 4.71 2.1 - 9.2 x10(3)/mcL    Mono # 0.93 0.1 - 1.3 x10(3)/mcL    Eos # 0.20 0 - 0.9 x10(3)/mcL    Baso # 0.05 <=0.2 x10(3)/mcL    IG# 0.01 0 - 0.04 x10(3)/mcL    IG% 0.1 %    NRBC% 0.0 %   Prepare RBC 4 Units; CABG    Collection Time: 07/27/23  4:39 AM   Result Value Ref Range    UNIT NUMBER [id number]     UNIT ABO/RH A POS     DISPENSE STATUS Selected     Unit Expiration [id number]     Product Code B6296R20     Unit Blood Type Code 6200     CROSSMATCH INTERPRETATION Compatible     UNIT NUMBER [id number]     UNIT ABO/RH A POS     DISPENSE STATUS Selected     Unit Expiration [id number]     Product Code Z5474Y07     Unit Blood Type Code 6200     CROSSMATCH INTERPRETATION Compatible     UNIT NUMBER [id number]     UNIT ABO/RH A POS     DISPENSE STATUS Selected     Unit Expiration [id number]     Product Code N1015M95     Unit Blood Type Code 6200     CROSSMATCH INTERPRETATION Compatible     UNIT NUMBER [id number]     UNIT ABO/RH A POS     DISPENSE STATUS Selected     Unit  Expiration [id number]     Product Code M3818O97     Unit Blood Type Code 6200     CROSSMATCH INTERPRETATION Compatible    POCT glucose    Collection Time: 07/27/23  6:15 AM   Result Value Ref Range    POCT Glucose 128 (H) 70 - 110 mg/dL   Echo    Collection Time: 07/27/23  9:07 AM   Result Value Ref Range    BSA 2.07 m2    LV Systolic Volume 132.00 mL    LV Systolic Volume Index 66.3 mL/m2    LV Diastolic Volume 190.00 mL    LV Diastolic Volume Index 95.48 mL/m2    Sanchez's Biplane MOD Ejection Fraction 3 %       Telemetry:  Sinus Rhythm    Physical Exam  HENT:      Head: Normocephalic.      Mouth/Throat:      Mouth: Mucous membranes are moist.      Pharynx: Oropharynx is clear.   Eyes:      Extraocular Movements: Extraocular movements intact.   Cardiovascular:      Rate and Rhythm: Normal rate and regular rhythm.   Pulmonary:      Effort: Pulmonary effort is normal. No respiratory distress.      Comments: On RA  Abdominal:      General: There is no distension.      Palpations: Abdomen is soft.      Tenderness: There is no abdominal tenderness. There is no guarding.   Musculoskeletal:         General: Normal range of motion.   Skin:     General: Skin is warm and dry.   Neurological:      General: No focal deficit present.      Mental Status: He is alert and oriented to person, place, and time. Mental status is at baseline.      Motor: No weakness.   Psychiatric:         Mood and Affect: Mood normal.         Behavior: Behavior normal.         Thought Content: Thought content normal.         Judgment: Judgment normal.     Current Inpatient Medications:    Current Facility-Administered Medications:     0.9%  NaCl infusion (for blood administration), , Intravenous, Q24H PRN, Guy Almazan PA-C    0.9%  NaCl infusion, , Intravenous, Continuous, Gloria Donald MD    acetaminophen tablet 650 mg, 650 mg, Oral, Q4H PRN, Gloria Donald MD    aspirin EC tablet 81 mg, 81 mg, Oral, Daily, Gloria Donald MD, 81 mg at  07/27/23 0829    atorvastatin tablet 80 mg, 80 mg, Oral, QHS, Shadia MERCED De LeonP, 80 mg at 07/26/23 2034    carvediloL tablet 12.5 mg, 12.5 mg, Oral, BID, Shadia Hull, FNP, 12.5 mg at 07/27/23 0829    diazePAM tablet 10 mg, 10 mg, Oral, On Call Procedure, Gloria Donald MD, 10 mg at 07/24/23 1205    diphenhydrAMINE capsule 50 mg, 50 mg, Oral, On Call Procedure, Gloria Donald MD, 50 mg at 07/24/23 1205    furosemide injection 40 mg, 40 mg, Intravenous, BID, Gloria Donald MD, 40 mg at 07/27/23 0829    glucagon (human recombinant) injection 1 mg, 1 mg, Intramuscular, PRN, MARTINA Deluna    glucose chewable tablet 16 g, 16 g, Oral, PRN, MARTINA Deluna    glucose chewable tablet 24 g, 24 g, Oral, PRN, MARTINA Deluna    heparin 25,000 units in dextrose 5% (100 units/ml) IV bolus from bag - ADDITIONAL PRN BOLUS - 30 units/kg (max bolus 4000 units), 30 Units/kg (Adjusted), Intravenous, PRN, Shadia Hull, FNP    heparin 25,000 units in dextrose 5% (100 units/ml) IV bolus from bag - ADDITIONAL PRN BOLUS - 60 units/kg (max bolus 4000 units), 55.1 Units/kg (Adjusted), Intravenous, PRN, Shadia Hull, FNP    heparin 25,000 units in dextrose 5% 250 mL (100 units/mL) infusion LOW INTENSITY nomogram - [facility], 0-40 Units/kg/hr (Adjusted), Intravenous, Continuous, Guy Almazan PA-C, Last Rate: 12.3 mL/hr at 07/27/23 0841, 17 Units/kg/hr at 07/27/23 0841    hydrALAZINE injection 10 mg, 10 mg, Intravenous, Q4H PRN, Gloria Donald MD    HYDROcodone-acetaminophen 5-325 mg per tablet 1 tablet, 1 tablet, Oral, Q4H PRN, Gloria Donald MD    insulin aspart U-100 injection 0-5 Units, 0-5 Units, Subcutaneous, QID (AC + HS) PRN, MARTINA Deluna, 2 Units at 07/25/23 1908    morphine injection 2 mg, 2 mg, Intravenous, Q4H PRN, Gloria Donald MD    ondansetron disintegrating tablet 8 mg, 8 mg, Oral, Q8H PRN, Gloria Donald MD    pantoprazole EC tablet 40 mg, 40 mg, Oral, Daily, Shadia L  RADHA Hull, 40 mg at 07/27/23 0829    perflutren lipid microspheres injection 1.3 mL, 1.3 mL, Intravenous, Once, Vazquez Holliday MD    VTE Risk Mitigation (From admission, onward)           Ordered     heparin 25,000 units in dextrose 5% (100 units/ml) IV bolus from bag - ADDITIONAL PRN BOLUS - 60 units/kg (max bolus 4000 units)  As needed (PRN)        Question:  Heparin Infusion Adjustment (DO NOT MODIFY ANSWER)  Answer:  \\[email or web address]\epic\Images\Pharmacy\HeparinInfusions\heparin LOW INTENSITY nomogram for [facility] [id number].pdf    07/25/23 0952     heparin 25,000 units in dextrose 5% (100 units/ml) IV bolus from bag - ADDITIONAL PRN BOLUS - 30 units/kg (max bolus 4000 units)  As needed (PRN)        Question:  Heparin Infusion Adjustment (DO NOT MODIFY ANSWER)  Answer:  \\[email or web address]\epic\Images\Pharmacy\HeparinInfusions\heparin LOW INTENSITY nomogram for [facility] [id number].pdf    07/25/23 0952     heparin 25,000 units in dextrose 5% 250 mL (100 units/mL) infusion LOW INTENSITY nomogram - [facility]  Continuous        Question Answer Comment   Begin at (in units/kg/hr) 12    Heparin Infusion Adjustment (DO NOT MODIFY ANSWER) \\[email or web address]\epic\Images\Pharmacy\HeparinInfusions\heparin LOW INTENSITY nomogram for [facility] [id number].pdf        07/25/23 0952                    Assessment:   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)    - [facility] 7.24.23: Left main - 90%, Prox LAD-1 - 80%, mid lesion - 60%, Lcx - 90%  Acute on Chronic Systolic HF (Clinically Stable on Room Air)  ICMO    - LVEF 25% (7.24.23)  VHD    - TR (Mild to Moderate), MR (Mild)  Hypertension  DM II  PSVT  PAD  Bladder Cancer  Nicotine Dependence/Tobacco Use    Plan:   Continue Current Cardiac Medications  Continue Heparin Infusion Re: MV CAD Awaiting CABG (Left Main Involvement)  Transition to Lasix 40 Mg PO Daily Tomorrow  K+ Goal- 4, Mag Goal- 2  Awaiting Plavix Washout  NTG SL PRN CP  CABG Scheduled for tomorrow (7.28.23)    RADHA Velazquez  Cardiology  [facility] - 9  [facility]  07/27/2023

## 2023-07-28 NOTE — Clinical Note
35 ml of contrast were injected throughout the case. 165 mL of contrast was the total wasted during the case. 200 mL was the total amount used during the case.

## 2023-07-28 NOTE — PROGRESS NOTES
[facility] - [facility] Medical Telemetry    Cardiology  Progress Note    Patient Name: Jason Barros  MRN: [id number]  Admission Date: 7/24/2023  Hospital Length of Stay: 4 days  Code Status: Full Code   Attending Physician: Gloria Donald MD   Primary Care Physician: Kingsley Painting Jr, MD  Expected Discharge Date:   Principal Problem:<principal problem not specified>    Subjective:     Brief HPI:   Mr. Barros is a 72 year old male who is known to [facility], Dr. Donald. He presents to [facility] for a scheduled outpatient LHC. He was found to have severe native triple-vessel coronary artery disease including left main disease. CT surgery was consulted to evaluate for CABG with recommendations of diuresis & plavix washout. [facility] has admitted the patient to manage his ICMO  & MV CAD.     Hospital Course:   7.25.23: NAD. Denies current CP, SOB, or palps. Right groin benign.   7.26.23: NAD. Improvement in SOB. Denies CP or palps. Inaccurate I&O's and daily weights. Mg 1, K 3.5.  7.27.23: NAD Noted. Vitals Stable. SR on Tele. On Heparin Infusion. Denies Angina. Awaiting CABG in Near Future.   7.28.23: NAD. Denies CP/SOB. CV surgery would like viability study to assess whether CABG will be beneficial. Discussed options with patient-- Thallium unavailable at this facility. Patient will need transfer to another facility capable of Cardiac MRI vs thallium study.         PMH: PSVT, HTN, PAD, ICMO, systolic CHF, CAD, HLD, DM 2, bladder CA  PSH: colon resection, CABG, penile prosthesis   Family History: Father - CA; Mother - VHD  Social History: Current every day smoker. Denies alcohol or illicit drug use.     Previous Cardiac Diagnostics:   Limited TTE 07.27.23:  EF approximately 20-25%. Severe global hypokinesis    TTE (7.25.23):  The left ventricle is severely enlarged with severely decreased systolic function.  The estimated ejection fraction is 25%.  Grade I left ventricular diastolic dysfunction.  Mild to moderate  tricuspid regurgitation.  Mild pulmonic regurgitation.  Mild mitral regurgitation.  Moderate right ventricular enlargement with moderately to severely reduced right ventricular systolic function.  Mild left atrial enlargement.  Intermediate central venous pressure (8 mmHg).  The estimated PA systolic pressure is 24 mmHg.    Carotid US (7.25.23):  The right internal carotid artery demonstrated less than 50% stenosis.  The left internal carotid artery demonstrated less than 50% stenosis.  Bilateral vertebral arteries were patent with antegrade flow.    LHC (7.24.23):  Left Main:     - Mid LM to Dist LM lesion was 90% stenosed. The lesion was calcified. The calcification amount was severe.  LAD:    - Prox LAD-1 lesion was 80% stenosed.    - Previously placed LAD-2 stent (unknown type) is widely patent.    - Mid LAD lesion was 60% stenosed.    - Previously placed Dist LAD stent (unknown type) is widely patent  Left Circumflex:    - Mid Cx lesion was 90% stenosed. The lesion was 100% stenosed. The lesion was previously treated using a stent of unknown type.  Saphenous Graft to Dist RCA    - Conduit type: SVG. The graft was visualized by angiography and was moderate in size. The graft was angiographically normal.     PET (6.23.23):  This is an abnormal perfusion study.   This scan is suggestive of low risk for future cardiovascular events.   Small fixed perfusion abnormality of mild intensity in the apical segment. Small fixed perfusion abnormality of mild intensity in the apical anterior segment.   The left ventricular cavity is noted to be moderately enlarged on the stress studies. The stress left ventricular ejection fraction was calculated to be 24% and left ventricular global function is severely reduced. The rest left ventricular cavity is noted to be mildly enlarged. The rest left ventricular ejection fraction was calculated to be 23% and rest left ventricular global function is severely reduced.   When compared to  the resting ejection fraction (23%), the stress ejection fraction (24%) has increased.   The study quality is good.   There was a rise in myocardial blood flow between rest and stress.  Global myocardial blood flow reserve was 1.46.  Myocardial blood flow reserve is globally abnormal, placing the patient at a higher coronary event risk.    TTE (5.9.23):  The study quality is average.   Global left ventricular systolic function is mildly decreased. The left ventricular ejection fraction is 40%.   Mild (1+) mitral regurgitation.  The pulmonary artery systolic pressure is 18 mmHg.     Review of Systems   Cardiovascular:  Negative for chest pain and palpitations.   Respiratory:  Negative for shortness of breath.      Objective:     Vital Signs (Most Recent):  Temp: 98.1 °F (36.7 °C) (07/28/23 0800)  Pulse: 70 (07/28/23 0840)  Resp: 18 (07/28/23 0800)  BP: 116/73 (07/28/23 0840)  SpO2: (!) 94 % (07/28/23 0800) Vital Signs (24h Range):  Temp:  [97.5 °F (36.4 °C)-98.1 °F (36.7 °C)] 98.1 °F (36.7 °C)  Pulse:  [67-81] 70  Resp:  [18-20] 18  SpO2:  [94 %-98 %] 94 %  BP: (105-127)/(60-84) 116/73     Weight: 91.6 kg (202 lb)  Body mass index is 33.61 kg/m².    SpO2: (!) 94 %         Intake/Output Summary (Last 24 hours) at 7/28/2023 0949  Last data filed at 7/27/2023 1343  Gross per 24 hour   Intake 720 ml   Output --   Net 720 ml         Lines/Drains/Airways       Peripheral Intravenous Line  Duration                  Peripheral IV - Single Lumen 07/28/23 0430 20 G Anterior;Right Forearm <1 day                    Significant Labs:   Recent Results (from the past 72 hour(s))   APTT    Collection Time: 07/25/23 10:04 AM   Result Value Ref Range    PTT 28.4 23.2 - 33.7 seconds   Protime-INR    Collection Time: 07/25/23 10:04 AM   Result Value Ref Range    PT 16.1 (H) 12.5 - 14.5 seconds    INR 1.30 0.00 - 1.30   POCT glucose    Collection Time: 07/25/23 10:17 AM   Result Value Ref Range    POCT Glucose 171 (H) 70 - 110 mg/dL    PTT Heparin Monitoring    Collection Time: 07/25/23  4:45 PM   Result Value Ref Range    PTT Heparin Monitor 34.4 (H) 23.2 - 33.7 seconds   POCT glucose    Collection Time: 07/25/23  6:07 PM   Result Value Ref Range    POCT Glucose 222 (H) 70 - 110 mg/dL   POCT glucose    Collection Time: 07/25/23  8:46 PM   Result Value Ref Range    POCT Glucose 109 70 - 110 mg/dL   PTT Heparin Monitoring    Collection Time: 07/26/23 12:14 AM   Result Value Ref Range    PTT Heparin Monitor 59.5 (H) 23.2 - 33.7 seconds   APTT    Collection Time: 07/26/23  4:58 AM   Result Value Ref Range    PTT 76.8 (H) 23.2 - 33.7 seconds   Basic Metabolic Panel    Collection Time: 07/26/23  4:58 AM   Result Value Ref Range    Sodium Level 139 136 - 145 mmol/L    Potassium Level 3.5 3.5 - 5.1 mmol/L    Chloride 100 98 - 107 mmol/L    Carbon Dioxide 30 23 - 31 mmol/L    Glucose Level 114 82 - 115 mg/dL    Blood Urea Nitrogen 10.0 8.4 - 25.7 mg/dL    Creatinine 0.82 0.73 - 1.18 mg/dL    BUN/Creatinine Ratio 12     Calcium Level Total 8.5 (L) 8.8 - 10.0 mg/dL    Anion Gap 9.0 mEq/L    eGFR >60 mls/min/1.73/m2   Magnesium    Collection Time: 07/26/23  4:58 AM   Result Value Ref Range    Magnesium Level 1.00 (L) 1.60 - 2.60 mg/dL   CBC with Differential    Collection Time: 07/26/23  4:58 AM   Result Value Ref Range    WBC 7.97 4.50 - 11.50 x10(3)/mcL    RBC 3.70 (L) 4.70 - 6.10 x10(6)/mcL    Hgb 11.2 (L) 14.0 - 18.0 g/dL    Hct 34.1 (L) 42.0 - 52.0 %    MCV 92.2 80.0 - 94.0 fL    MCH 30.3 27.0 - 31.0 pg    MCHC 32.8 (L) 33.0 - 36.0 g/dL    RDW 17.0 11.5 - 17.0 %    Platelet 189 130 - 400 x10(3)/mcL    MPV 10.9 (H) 7.4 - 10.4 fL    Neut % 62.5 %    Lymph % 22.0 %    Mono % 12.4 %    Eos % 2.1 %    Basophil % 0.6 %    Lymph # 1.75 0.6 - 4.6 x10(3)/mcL    Neut # 4.98 2.1 - 9.2 x10(3)/mcL    Mono # 0.99 0.1 - 1.3 x10(3)/mcL    Eos # 0.17 0 - 0.9 x10(3)/mcL    Baso # 0.05 <=0.2 x10(3)/mcL    IG# 0.03 0 - 0.04 x10(3)/mcL    IG% 0.4 %    NRBC% 0.0 %    Platelet Function Assay    Collection Time: 07/26/23  8:37 AM   Result Value Ref Range    Col/ADP      Col/Epi 106 68 - 183 seconds   POCT glucose    Collection Time: 07/26/23 11:32 AM   Result Value Ref Range    POCT Glucose 128 (H) 70 - 110 mg/dL   PTT Heparin Monitoring    Collection Time: 07/26/23  1:04 PM   Result Value Ref Range    PTT Heparin Monitor 92.0 (H) 23.2 - 33.7 seconds   Magnesium    Collection Time: 07/26/23  1:04 PM   Result Value Ref Range    Magnesium Level 1.90 1.60 - 2.60 mg/dL   POCT glucose    Collection Time: 07/26/23  8:37 PM   Result Value Ref Range    POCT Glucose 110 70 - 110 mg/dL   POCT glucose    Collection Time: 07/26/23  9:04 PM   Result Value Ref Range    POC Glucose 110 70 - 110 MG/DL   APTT    Collection Time: 07/27/23  4:39 AM   Result Value Ref Range    .6 (H) 23.2 - 33.7 seconds   Type & Screen    Collection Time: 07/27/23  4:39 AM   Result Value Ref Range    Group & Rh A POS     Indirect Anupama GEL NEG     Specimen Outdate 07/30/2023 23:59    Basic Metabolic Panel    Collection Time: 07/27/23  4:39 AM   Result Value Ref Range    Sodium Level 138 136 - 145 mmol/L    Potassium Level 4.1 3.5 - 5.1 mmol/L    Chloride 99 98 - 107 mmol/L    Carbon Dioxide 27 23 - 31 mmol/L    Glucose Level 111 82 - 115 mg/dL    Blood Urea Nitrogen 11.1 8.4 - 25.7 mg/dL    Creatinine 0.86 0.73 - 1.18 mg/dL    BUN/Creatinine Ratio 13     Calcium Level Total 8.7 (L) 8.8 - 10.0 mg/dL    Anion Gap 12.0 mEq/L    eGFR >60 mls/min/1.73/m2   Magnesium    Collection Time: 07/27/23  4:39 AM   Result Value Ref Range    Magnesium Level 1.60 1.60 - 2.60 mg/dL   CBC with Differential    Collection Time: 07/27/23  4:39 AM   Result Value Ref Range    WBC 7.73 4.50 - 11.50 x10(3)/mcL    RBC 3.82 (L) 4.70 - 6.10 x10(6)/mcL    Hgb 11.5 (L) 14.0 - 18.0 g/dL    Hct 35.9 (L) 42.0 - 52.0 %    MCV 94.0 80.0 - 94.0 fL    MCH 30.1 27.0 - 31.0 pg    MCHC 32.0 (L) 33.0 - 36.0 g/dL    RDW 16.9 11.5 - 17.0 %     Platelet 186 130 - 400 x10(3)/mcL    MPV 10.3 7.4 - 10.4 fL    Neut % 61.0 %    Lymph % 23.7 %    Mono % 12.0 %    Eos % 2.6 %    Basophil % 0.6 %    Lymph # 1.83 0.6 - 4.6 x10(3)/mcL    Neut # 4.71 2.1 - 9.2 x10(3)/mcL    Mono # 0.93 0.1 - 1.3 x10(3)/mcL    Eos # 0.20 0 - 0.9 x10(3)/mcL    Baso # 0.05 <=0.2 x10(3)/mcL    IG# 0.01 0 - 0.04 x10(3)/mcL    IG% 0.1 %    NRBC% 0.0 %   Prepare RBC 4 Units; CABG    Collection Time: 07/27/23  4:39 AM   Result Value Ref Range    UNIT NUMBER [id number]     UNIT ABO/RH A POS     DISPENSE STATUS Selected     Unit Expiration [id number]     Product Code V0005M19     Unit Blood Type Code 6200     CROSSMATCH INTERPRETATION Compatible     UNIT NUMBER [id number]     UNIT ABO/RH A POS     DISPENSE STATUS Selected     Unit Expiration [id number]     Product Code D4354S26     Unit Blood Type Code 6200     CROSSMATCH INTERPRETATION Compatible     UNIT NUMBER [id number]     UNIT ABO/RH A POS     DISPENSE STATUS Selected     Unit Expiration [id number]     Product Code J6351S04     Unit Blood Type Code 6200     CROSSMATCH INTERPRETATION Compatible     UNIT NUMBER [id number]     UNIT ABO/RH A POS     DISPENSE STATUS Selected     Unit Expiration [id number]     Product Code A6629P59     Unit Blood Type Code 6200     CROSSMATCH INTERPRETATION Compatible    POCT glucose    Collection Time: 07/27/23  6:15 AM   Result Value Ref Range    POCT Glucose 128 (H) 70 - 110 mg/dL   Echo    Collection Time: 07/27/23  9:07 AM   Result Value Ref Range    BSA 2.07 m2    LV Systolic Volume 132.00 mL    LV Systolic Volume Index 66.3 mL/m2    LV Diastolic Volume 190.00 mL    LV Diastolic Volume Index 95.48 mL/m2    Sanchez's Biplane MOD Ejection Fraction 3 %   POCT glucose    Collection Time: 07/27/23 11:03 AM   Result Value Ref Range    POCT Glucose 124 (H) 70 - 110 mg/dL   POCT glucose    Collection Time: 07/27/23  4:24 PM   Result Value Ref Range    POCT Glucose 104 70 - 110 mg/dL   POCT  glucose    Collection Time: 07/27/23  8:10 PM   Result Value Ref Range    POCT Glucose 160 (H) 70 - 110 mg/dL   Basic Metabolic Panel    Collection Time: 07/28/23  4:15 AM   Result Value Ref Range    Sodium Level 137 136 - 145 mmol/L    Potassium Level 3.6 3.5 - 5.1 mmol/L    Chloride 99 98 - 107 mmol/L    Carbon Dioxide 28 23 - 31 mmol/L    Glucose Level 102 82 - 115 mg/dL    Blood Urea Nitrogen 11.6 8.4 - 25.7 mg/dL    Creatinine 0.88 0.73 - 1.18 mg/dL    BUN/Creatinine Ratio 13     Calcium Level Total 9.3 8.8 - 10.0 mg/dL    Anion Gap 10.0 mEq/L    eGFR >60 mls/min/1.73/m2   Magnesium    Collection Time: 07/28/23  4:15 AM   Result Value Ref Range    Magnesium Level 1.40 (L) 1.60 - 2.60 mg/dL   CBC with Differential    Collection Time: 07/28/23  4:15 AM   Result Value Ref Range    WBC 8.66 4.50 - 11.50 x10(3)/mcL    RBC 4.22 (L) 4.70 - 6.10 x10(6)/mcL    Hgb 12.4 (L) 14.0 - 18.0 g/dL    Hct 39.0 (L) 42.0 - 52.0 %    MCV 92.4 80.0 - 94.0 fL    MCH 29.4 27.0 - 31.0 pg    MCHC 31.8 (L) 33.0 - 36.0 g/dL    RDW 16.9 11.5 - 17.0 %    Platelet 221 130 - 400 x10(3)/mcL    MPV 10.9 (H) 7.4 - 10.4 fL    Neut % 61.8 %    Lymph % 22.9 %    Mono % 11.7 %    Eos % 2.8 %    Basophil % 0.6 %    Lymph # 1.98 0.6 - 4.6 x10(3)/mcL    Neut # 5.36 2.1 - 9.2 x10(3)/mcL    Mono # 1.01 0.1 - 1.3 x10(3)/mcL    Eos # 0.24 0 - 0.9 x10(3)/mcL    Baso # 0.05 <=0.2 x10(3)/mcL    IG# 0.02 0 - 0.04 x10(3)/mcL    IG% 0.2 %    NRBC% 0.0 %   PTT Heparin Monitoring    Collection Time: 07/28/23  5:57 AM   Result Value Ref Range    PTT Heparin Monitor >200.0 (HH) 23.2 - 33.7 seconds   PTT Heparin Monitoring    Collection Time: 07/28/23  8:36 AM   Result Value Ref Range    PTT Heparin Monitor 78.0 (H) 23.2 - 33.7 seconds       Telemetry:  Sinus Rhythm    Physical Exam  HENT:      Head: Normocephalic.      Mouth/Throat:      Mouth: Mucous membranes are moist.      Pharynx: Oropharynx is clear.   Eyes:      Extraocular Movements: Extraocular movements  intact.   Cardiovascular:      Rate and Rhythm: Normal rate and regular rhythm.   Pulmonary:      Effort: Pulmonary effort is normal. No respiratory distress.      Comments: On RA  Abdominal:      General: There is no distension.      Palpations: Abdomen is soft.      Tenderness: There is no abdominal tenderness. There is no guarding.   Musculoskeletal:         General: Normal range of motion.   Skin:     General: Skin is warm and dry.   Neurological:      General: No focal deficit present.      Mental Status: He is alert and oriented to person, place, and time. Mental status is at baseline.      Motor: No weakness.   Psychiatric:         Mood and Affect: Mood normal.         Behavior: Behavior normal.         Thought Content: Thought content normal.         Judgment: Judgment normal.     Current Inpatient Medications:    Current Facility-Administered Medications:     0.9%  NaCl infusion (for blood administration), , Intravenous, Q24H PRN, Guy Almazan PA-C    acetaminophen tablet 650 mg, 650 mg, Oral, Q4H PRN, Gloria Donald MD    aspirin EC tablet 81 mg, 81 mg, Oral, Daily, Gloria Donald MD, 81 mg at 07/28/23 0840    atorvastatin tablet 80 mg, 80 mg, Oral, QHS, MERCED LinaresP, 80 mg at 07/27/23 2006    carvediloL tablet 12.5 mg, 12.5 mg, Oral, BID, MERCED LinaresP, 12.5 mg at 07/28/23 0840    diazePAM tablet 10 mg, 10 mg, Oral, On Call Procedure, Gloria Donald MD, 10 mg at 07/24/23 1205    diphenhydrAMINE capsule 50 mg, 50 mg, Oral, On Call Procedure, Gloria Donald MD, 50 mg at 07/24/23 1205    furosemide tablet 40 mg, 40 mg, Oral, Daily, Cherelle Osullivan, FNP, 40 mg at 07/28/23 0840    glucagon (human recombinant) injection 1 mg, 1 mg, Intramuscular, PRN, MARTINA Deluna    glucose chewable tablet 16 g, 16 g, Oral, PRN, MARTINA Deluna    glucose chewable tablet 24 g, 24 g, Oral, PRN, MARTINA Deluna    heparin 25,000 units in dextrose 5% (100 units/ml) IV bolus from bag -  ADDITIONAL PRN BOLUS - 30 units/kg (max bolus 4000 units), 30 Units/kg (Adjusted), Intravenous, PRN, Shadia Hull, FNP    heparin 25,000 units in dextrose 5% (100 units/ml) IV bolus from bag - ADDITIONAL PRN BOLUS - 60 units/kg (max bolus 4000 units), 55.1 Units/kg (Adjusted), Intravenous, PRN, Shadia Alcarazkin, FNP    heparin 25,000 units in dextrose 5% 250 mL (100 units/mL) infusion LOW INTENSITY nomogram - [facility], 0-40 Units/kg/hr (Adjusted), Intravenous, Continuous, Guy Almazan PA-C, Last Rate: 12.3 mL/hr at 07/28/23 0141, 17 Units/kg/hr at 07/28/23 0141    hydrALAZINE injection 10 mg, 10 mg, Intravenous, Q4H PRN, Gloria Donald MD    HYDROcodone-acetaminophen 5-325 mg per tablet 1 tablet, 1 tablet, Oral, Q4H PRN, Gloria Donald MD    insulin aspart U-100 injection 0-5 Units, 0-5 Units, Subcutaneous, QID (AC + HS) PRN, MARTINA Deluna, 2 Units at 07/25/23 1908    morphine injection 2 mg, 2 mg, Intravenous, Q4H PRN, Gloria Donald MD    nitroGLYCERIN SL tablet 0.4 mg, 0.4 mg, Sublingual, Q5 Min PRN, Cherelle Osullivan, FNP    ondansetron disintegrating tablet 8 mg, 8 mg, Oral, Q8H PRN, Gloria Donald MD    pantoprazole EC tablet 40 mg, 40 mg, Oral, Daily, MERCED LinaresP, 40 mg at 07/28/23 0840    perflutren lipid microspheres injection 1.3 mL, 1.3 mL, Intravenous, Once, Vazquez Holliday MD    VTE Risk Mitigation (From admission, onward)           Ordered     heparin 25,000 units in dextrose 5% (100 units/ml) IV bolus from bag - ADDITIONAL PRN BOLUS - 60 units/kg (max bolus 4000 units)  As needed (PRN)        Question:  Heparin Infusion Adjustment (DO NOT MODIFY ANSWER)  Answer:  [email or web address]\epic\Images\Pharmacy\HeparinInfusions\heparin LOW INTENSITY nomogram for [facility] [id number].pdf    07/25/23 0908     heparin 25,000 units in dextrose 5% (100 units/ml) IV bolus from bag - ADDITIONAL PRN BOLUS - 30 units/kg (max bolus 4000 units)  As needed (PRN)        Question:  Heparin Infusion Adjustment  (DO NOT MODIFY ANSWER)  Answer:  \\[employer]\epic\Images\Pharmacy\HeparinInfusions\heparin LOW INTENSITY nomogram for [facility] [id number].pdf    07/25/23 0952     heparin 25,000 units in dextrose 5% 250 mL (100 units/mL) infusion LOW INTENSITY nomogram - [facility]  Continuous        Question Answer Comment   Begin at (in units/kg/hr) 12    Heparin Infusion Adjustment (DO NOT MODIFY ANSWER) \\[email or web address]\epic\Images\Pharmacy\HeparinInfusions\heparin LOW INTENSITY nomogram for [facility] [id number].pdf        07/25/23 0952                    Assessment:   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)    - [facility] 7.24.23: Left main - 90%, Prox LAD-1 - 80%, mid lesion - 60%, Lcx - 90%  Acute on Chronic Systolic HF (Clinically Stable on Room Air)  ICMO    - LVEF 25% (7.27.23)  VHD    - TR (Mild to Moderate), MR (Mild)  Hypertension  DM II  PSVT  PAD  Bladder Cancer  Nicotine Dependence/Tobacco Use    Plan:   Continue aspirin, statin, coreg, and lasix.  Continue Heparin Infusion Re: MV CAD Awaiting CABG (Left Main Involvement)  K+ Goal- 4, Mag Goal- 2  Awaiting Plavix Washout  NTG SL PRN CP  CABG has been cancelled due to need for viability study. Thallium unavailable here.  Will request transfer to [facility] for Cardiac MRI and possible CABG.     Brittany Galloway, MERCEDP  Cardiology  [facility] - [facility] Medical Telemetry  07/28/2023

## 2023-07-28 NOTE — Clinical Note
The catheter was inserted into the distal   left anterior descending. Ivus was preformed lad to the lm

## 2023-07-29 PROBLEM — I25.10 CAD, MULTIPLE VESSEL: Status: ACTIVE | Noted: 2023-01-01

## 2023-07-29 PROBLEM — E11.9 TYPE 2 DIABETES MELLITUS: Status: ACTIVE | Noted: 2023-01-01

## 2023-07-29 PROBLEM — Z95.810 ICD (IMPLANTABLE CARDIOVERTER-DEFIBRILLATOR) IN PLACE: Status: ACTIVE | Noted: 2023-01-01

## 2023-07-29 PROBLEM — E83.42 HYPOMAGNESEMIA: Status: ACTIVE | Noted: 2023-01-01

## 2023-07-29 PROBLEM — E78.5 HYPERLIPIDEMIA: Status: ACTIVE | Noted: 2023-01-01

## 2023-07-29 PROBLEM — I25.5 ISCHEMIC CARDIOMYOPATHY: Status: ACTIVE | Noted: 2023-01-01

## 2023-07-29 PROBLEM — I50.43 ACUTE ON CHRONIC COMBINED SYSTOLIC AND DIASTOLIC HEART FAILURE: Status: ACTIVE | Noted: 2023-01-01

## 2023-07-29 NOTE — HPI
Mr. Barros is a 72-year-old M w/ PMHx CAD s/p prior CABG (vein graft to distal RCA), ICM, HFrEF (LVEF 20%), s/p ICD,  HTN, type 2 who was transferred from [facility] for further viability study prior to CABG consideration after he presented with angina and found to have mvCAD. LHC done in 7.24.2 showed: Left main - 90%, Prox LAD-1 - 80%, mid lesion - 60%, Lcx - 90% w/ a widely patent RCA graft. TTE from 7/25 showed a severely reduced LVEF  of 25% and moderately to severely reduced right ventricular systolic function.    Patient has been off Plavix to allow for washout. Transferred on a heparin gtt. He has been chest pain free since his angiogram. Denies SOB, GOMEZ, orthopnea, N/V, or any complaints now.     PMH: PSVT, HTN, PAD, ICMO, systolic CHF, CAD, HLD, DM 2, bladder CA  PSH: colon resection, CABG, penile prosthesis   Family History: Father - CA; Mother - VHD  Social History: Current every day smoker. Denies alcohol or illicit drug use.    Previous Cardiac Diagnostics:   TTE (7.25.23):  The left ventricle is severely enlarged with severely decreased systolic function.  The estimated ejection fraction is 25%.  Grade I left ventricular diastolic dysfunction.  Mild to moderate tricuspid regurgitation.  Mild pulmonic regurgitation.  Mild mitral regurgitation.  Moderate right ventricular enlargement with moderately to severely reduced right ventricular systolic function.  Mild left atrial enlargement.  Intermediate central venous pressure (8 mmHg).  The estimated PA systolic pressure is 24 mmHg.     Carotid US (7.25.23):  The right internal carotid artery demonstrated less than 50% stenosis.  The left internal carotid artery demonstrated less than 50% stenosis.  Bilateral vertebral arteries were patent with antegrade flow.     LHC (7.24.23):  Left Main:     - Mid LM to Dist LM lesion was 90% stenosed. The lesion was calcified. The calcification amount was severe.  LAD:    - Prox LAD-1 lesion was 80% stenosed.    -  Previously placed LAD-2 stent (unknown type) is widely patent.    - Mid LAD lesion was 60% stenosed.    - Previously placed Dist LAD stent (unknown type) is widely patent  Left Circumflex:    - Mid Cx lesion was 90% stenosed. The lesion was 100% stenosed. The lesion was previously treated using a stent of unknown type.  Saphenous Graft to Dist RCA    - Conduit type: SVG. The graft was visualized by angiography and was moderate in size. The graft was angiographically normal.      PET (6.23.23):  This is an abnormal perfusion study.   This scan is suggestive of low risk for future cardiovascular events.   Small fixed perfusion abnormality of mild intensity in the apical segment. Small fixed perfusion abnormality of mild intensity in the apical anterior segment.   The left ventricular cavity is noted to be moderately enlarged on the stress studies. The stress left ventricular ejection fraction was calculated to be 24% and left ventricular global function is severely reduced. The rest left ventricular cavity is noted to be mildly enlarged. The rest left ventricular ejection fraction was calculated to be 23% and rest left ventricular global function is severely reduced.   When compared to the resting ejection fraction (23%), the stress ejection fraction (24%) has increased.   The study quality is good.   There was a rise in myocardial blood flow between rest and stress.  Global myocardial blood flow reserve was 1.46.  Myocardial blood flow reserve is globally abnormal, placing the patient at a higher coronary event risk.     TTE (5.9.23):  The study quality is average.   Global left ventricular systolic function is mildly decreased. The left ventricular ejection fraction is 40%.   Mild (1+) mitral regurgitation.  The pulmonary artery systolic pressure is 18 mmHg.

## 2023-07-29 NOTE — PROGRESS NOTES
[facility] - [facility] Stepdown  Cardiology  Progress Note    Patient Name: Jason Barros  MRN: [id number]  Admission Date: 7/28/2023  Hospital Length of Stay: 1 days  Code Status: Full Code   Attending Physician: Alex Dietz MD   Primary Care Physician: Kingsley Painting Jr, MD  Expected Discharge Date:   Principal Problem:CAD, multiple vessel    Subjective:     Hospital Course:   72 year-old male admitted to CSU as transfer from OSH for advanced imaging prior to CABG consideration. Patient with multi-vessel disease.      Interval History: NAEON. Patient without any chest pain, shortness of breath. Discussed that he was sent to [facility] for further imaging considerations. Patient is on Heparin gtt currently. Patient notes he has not had chest pain in two weeks.    Review of Systems   Constitutional: Negative for chills and fever.   HENT:  Negative for sore throat.    Cardiovascular:  Negative for chest pain, claudication, dyspnea on exertion, leg swelling, near-syncope, orthopnea and palpitations.   Respiratory:  Negative for cough.    Gastrointestinal:  Negative for nausea and vomiting.   Genitourinary:  Negative for dysuria and flank pain.   Neurological:  Negative for headaches and light-headedness.   Psychiatric/Behavioral:  Negative for substance abuse. The patient is not nervous/anxious.      Objective:     Vital Signs (Most Recent):  Temp: 98.4 °F (36.9 °C) (07/29/23 0439)  Pulse: 74 (07/29/23 0805)  Resp: 18 (07/29/23 0439)  BP: (!) 140/62 (07/29/23 0439)  SpO2: (!) 94 % (07/29/23 0439) Vital Signs (24h Range):  Temp:  [97.9 °F (36.6 °C)-98.4 °F (36.9 °C)] 98.4 °F (36.9 °C)  Pulse:  [66-88] 74  Resp:  [16-20] 18  SpO2:  [94 %-97 %] 94 %  BP: (103-140)/(62-77) 140/62     Weight: 79.2 kg (174 lb 9.7 oz)  Body mass index is 29.06 kg/m².     SpO2: (!) 94 %       No intake or output data in the 24 hours ending 07/29/23 0835    Lines/Drains/Airways       Peripheral Intravenous Line  Duration                   Peripheral IV - Single Lumen 07/28/23 0430 20 G Anterior;Right Forearm 1 day                       Physical Exam  Vitals and nursing note reviewed.   Constitutional:       General: He is not in acute distress.     Appearance: He is not ill-appearing, toxic-appearing or diaphoretic.   HENT:      Head: Normocephalic and atraumatic.      Right Ear: External ear normal.      Left Ear: External ear normal.   Eyes:      General:         Right eye: No discharge.         Left eye: No discharge.      Conjunctiva/sclera: Conjunctivae normal.   Cardiovascular:      Rate and Rhythm: Normal rate and regular rhythm.      Pulses: Normal pulses.      Heart sounds: Normal heart sounds.   Pulmonary:      Effort: Pulmonary effort is normal. No respiratory distress.      Breath sounds: Normal breath sounds.   Abdominal:      General: Abdomen is flat.      Palpations: Abdomen is soft.   Musculoskeletal:      Cervical back: Normal range of motion and neck supple.      Right lower leg: No edema.      Left lower leg: No edema.   Skin:     General: Skin is warm and dry.      Findings: Bruising present.   Neurological:      Mental Status: He is alert and oriented to person, place, and time. Mental status is at baseline.   Psychiatric:         Mood and Affect: Mood normal.         Behavior: Behavior normal.         Thought Content: Thought content normal.         Judgment: Judgment normal.            Significant Labs:   Recent Labs   Lab 07/28/23  0415 07/28/23 2328 07/29/23  0613   GLU  --  92 95    137 136   K 3.6 3.8 3.8   CL  --  101 102   CO2 28 24 24   BUN 11.6 13 11   CREATININE 0.88 0.9 0.8   CALCIUM 9.3 9.1 9.2   MG 1.40* 1.3*  --      Recent Labs   Lab 07/28/23  0415 07/28/23 2328 07/29/23  0613    137 136   K 3.6 3.8 3.8   CL  --  101 102   CO2 28 24 24   GLU  --  92 95   BUN 11.6 13 11   CREATININE 0.88 0.9 0.8   CALCIUM 9.3 9.1 9.2   PROT  --  7.3  --    ALBUMIN  --  3.5  --    BILITOT  --  0.7  --    ALKPHOS  --   132  --    AST  --  32  --    ALT  --  36  --    ANIONGAP  --  12 10     Recent Labs   Lab 07/28/23  0415 07/28/23  2328 07/28/23  2328 07/29/23  0613   WBC 8.66 8.50  --  8.25   HGB 12.4* 12.1*  --  12.9*   HCT 39.0* 37.3*   < > 39.4*    189  --  199    < > = values in this interval not displayed.     Recent Labs   Lab 07/29/23  0613   INR 1.2       Significant Imaging:  Imaging from past 24 hours reviewed.    Assessment and Plan:       * CAD, multiple vessel  Mr. Barros is a 72-year-old M w/ PMHx CAD s/p prior CABG (vein graft to distal RCA), ICM, HFrEF (LVEF 20%), s/p ICD,  HTN, type 2 who was transferred from OSH for further viability study prior to CABG consideration after he presented with angina and found to have mvCAD. LHC done in 7.24.2 showed: Left main - 90%, Prox LAD-1 - 80%, mid lesion - 60%, Lcx - 90% w/ a widely patent RCA graft. TTE from 7/25 showed a severely reduced LVEF of 25% and moderately to severely reduced right ventricular systolic function. Patient started on heparin gtt at OSH for therapeutic.     Plan:   - Will attempt to arrange for a viability study on Monday  - DAPT: continue ASA, load with plavix  - discontinued heparin gtt  - Cont high intensity statin   - Cont coreg 12.5mg BID    ICD (implantable cardioverter-defibrillator) in place  Noted in history      Hypomagnesemia  Magneisum noted to be 1.3 on admission. Patient got 2g IV replacement      --Replace magnesium as needed  --follow up repeat magnesium level  --mag daily    Hyperlipidemia  -continue high intensity statin    Type 2 diabetes mellitus  a1C 5.9. ISS as needed    Ischemic cardiomyopathy  Please refer to assessment of mvCAD    Acute on chronic combined systolic and diastolic heart failure  TTE from 7/25 showed a severely reduced LVEF  of 25% and moderately to severely reduced right ventricular systolic function. LVEF in 5/9/2023 was noted to be 40%.     Plan:   -Continue coreg 12.5mg BID  - Holding rest of GDMT  until final decision for cabg.   - strict I&Os and daily weights  - euvolemic, lasix as needed        VTE Risk Mitigation (From admission, onward)         Ordered     Reason for no Mechanical VTE Prophylaxis  Once        Question:  Reasons:  Answer:  IV Heparin within 24 hrs. Pre or Post-Op    07/28/23 2313     IP VTE HIGH RISK PATIENT  Once         07/28/23 2313                Bay Crespo MD  Cardiology  [facility] - Cardiology Stepdown

## 2023-07-29 NOTE — SUBJECTIVE & OBJECTIVE
Past Medical History:   Diagnosis Date    Arthritis     Diabetes mellitus, type 2     Hypertension        Past Surgical History:   Procedure Laterality Date    LEFT HEART CATHETERIZATION Left 7/24/2023    Procedure: Left heart cath;  Surgeon: Gloria Donald MD;  Location: [facility] CATH LAB;  Service: Cardiology;  Laterality: Left;  [facility]       Review of patient's allergies indicates:   Allergen Reactions    Ranolazine Shortness Of Breath     groggy         Current Facility-Administered Medications on File Prior to Encounter   Medication    diazePAM tablet 10 mg    diphenhydrAMINE capsule 50 mg    [DISCONTINUED] 0.9%  NaCl infusion (for blood administration)    [DISCONTINUED] acetaminophen tablet 650 mg    [DISCONTINUED] aspirin EC tablet 81 mg    [DISCONTINUED] atorvastatin tablet 80 mg    [DISCONTINUED] carvediloL tablet 12.5 mg    [DISCONTINUED] furosemide tablet 40 mg    [DISCONTINUED] glucagon (human recombinant) injection 1 mg    [DISCONTINUED] glucose chewable tablet 16 g    [DISCONTINUED] glucose chewable tablet 24 g    [DISCONTINUED] heparin 25,000 units in dextrose 5% (100 units/ml) IV bolus from bag - ADDITIONAL PRN BOLUS - 30 units/kg (max bolus 4000 units)    [DISCONTINUED] heparin 25,000 units in dextrose 5% (100 units/ml) IV bolus from bag - ADDITIONAL PRN BOLUS - 60 units/kg (max bolus 4000 units)    [DISCONTINUED] heparin 25,000 units in dextrose 5% 250 mL (100 units/mL) infusion LOW INTENSITY nomogram - LAF    [DISCONTINUED] hydrALAZINE injection 10 mg    [DISCONTINUED] HYDROcodone-acetaminophen 5-325 mg per tablet 1 tablet    [DISCONTINUED] insulin aspart U-100 injection 0-5 Units    [DISCONTINUED] morphine injection 2 mg    [DISCONTINUED] nitroGLYCERIN SL tablet 0.4 mg    [DISCONTINUED] ondansetron disintegrating tablet 8 mg    [DISCONTINUED] pantoprazole EC tablet 40 mg    [DISCONTINUED] perflutren lipid microspheres injection 1.3 mL     Current Outpatient Medications on File Prior to Encounter    Medication Sig    atorvastatin (LIPITOR) 80 MG tablet Take 80 mg by mouth.    carvediloL (COREG) 12.5 MG tablet Take 12.5 mg by mouth 2 (two) times daily.    clopidogreL (PLAVIX) 75 mg tablet Take 75 mg by mouth.    ferrous sulfate 324 mg (65 mg iron) TbEC Take 65 mg by mouth.    gabapentin (NEURONTIN) 400 MG capsule Take 400 mg by mouth every 8 (eight) hours.    metFORMIN (GLUCOPHAGE) 500 MG tablet Take 1,000 mg by mouth.    mometasone-formoterol (DULERA) 100-5 mcg/actuation HFAA Inhale into the lungs.    oxyCODONE (ROXICODONE) 15 MG Tab TAKE 1 TABLET BY MOUTH EVERY 8 TO 12 HOURS AS NEEDED FOR FOR PAIN    pantoprazole (PROTONIX) 40 MG tablet Take 40 mg by mouth.    solifenacin (VESICARE) 5 MG tablet Take 5 mg by mouth.     Family History    None       Tobacco Use    Smoking status: Every Day     Current packs/day: 1.00     Types: Cigarettes    Smokeless tobacco: Never   Substance and Sexual Activity    Alcohol use: Yes    Drug use: Never    Sexual activity: Not on file     Review of Systems   Constitutional: Negative for chills and fever.   HENT:  Negative for sore throat.    Cardiovascular:  Negative for chest pain, claudication, dyspnea on exertion, leg swelling, near-syncope, orthopnea and palpitations.   Respiratory:  Negative for cough.    Musculoskeletal:  Positive for joint pain.   Gastrointestinal:  Negative for nausea and vomiting.   Genitourinary:  Negative for dysuria and flank pain.   Neurological:  Negative for headaches and light-headedness.   Psychiatric/Behavioral:  Negative for substance abuse. The patient is not nervous/anxious.      Objective:     Vital Signs (Most Recent):  Temp: 98.4 °F (36.9 °C) (07/29/23 0439)  Pulse: 88 (07/29/23 0439)  Resp: 18 (07/29/23 0439)  BP: (!) 140/62 (07/29/23 0439)  SpO2: (!) 94 % (07/29/23 0439) Vital Signs (24h Range):  Temp:  [97.9 °F (36.6 °C)-98.4 °F (36.9 °C)] 98.4 °F (36.9 °C)  Pulse:  [66-88] 88  Resp:  [16-20] 18  SpO2:  [94 %-97 %] 94 %  BP:  (103-140)/(62-77) 140/62     Weight: 79.2 kg (174 lb 9.7 oz)  Body mass index is 29.06 kg/m².    SpO2: (!) 94 %       No intake or output data in the 24 hours ending 07/29/23 0638    Lines/Drains/Airways       Peripheral Intravenous Line  Duration                  Peripheral IV - Single Lumen 07/28/23 0430 20 G Anterior;Right Forearm 1 day                     Physical Exam  General: AAOx 3, in no acute distress.   HENT:      Head: Normocephalic.      Mouth/Throat:      Mouth: Mucous membranes are moist.      Pharynx: Oropharynx is clear.   Eyes:      Extraocular Movements: Extraocular movements intact.   Cardiovascular:      Rate and Rhythm: Normal rate and regular rhythm.   Pulmonary:      Effort: Pulmonary effort is normal. No respiratory distress.      Comments: On RA  Abdominal:      General: There is no distension.      Palpations: Abdomen is soft.      Tenderness: There is no abdominal tenderness. There is no guarding.   Musculoskeletal:         General: Normal range of motion.   Skin:     General: Skin is warm and dry.   Neurological:      General: No focal deficit present.      Mental Status: He is alert and oriented to person, place, and time. Mental status is at baseline.      Motor: No weakness.   Psychiatric:         Mood and Affect: Mood normal.         Behavior: Behavior normal.         Thought Content: Thought content normal.         Judgment: Judgment normal.      Significant Labs:   Recent Lab Results         07/28/23  2328   07/28/23  1604   07/28/23  1100   07/28/23  0836        Albumin 3.5             Alkaline Phosphatase 132             ALT 36             Anion Gap 12             aPTT 53.7  Comment: Refer to local heparin nomogram for intensity/dose specific   therapeutic   range.               AST 32             Baso # 0.05             Basophil % 0.6             BILIRUBIN TOTAL 0.7  Comment: For infants and newborns, interpretation of results should be based  on gestational age, weight and in  agreement with clinical  observations.    Premature Infant recommended reference ranges:  Up to 24 hours.............<8.0 mg/dL  Up to 48 hours............<12.0 mg/dL  3-5 days..................<15.0 mg/dL  6-29 days.................<15.0 mg/dL               BUN 13             Calcium 9.1             Chloride 101             CO2 24             Creatinine 0.9             Differential Method Automated             eGFR >60.0             Eos # 0.2             Eosinophil % 2.7             Glucose 92             Gran # (ANC) 4.8             Gran % 56.0             Hematocrit 37.3             Hemoglobin 12.1             Immature Grans (Abs) 0.03  Comment: Mild elevation in immature granulocytes is non specific and   can be seen in a variety of conditions including stress response,   acute inflammation, trauma and pregnancy. Correlation with other   laboratory and clinical findings is essential.               Immature Granulocytes 0.4             Lymph # 2.3             Lymph % 27.5             Magnesium 1.3             MCH 30.4             MCHC 32.4             MCV 94             Mono # 1.1             Mono % 12.8             MPV 10.6             nRBC 0             Phosphorus 3.3             Platelets 189             POCT Glucose     99         Potassium 3.8             PROTEIN TOTAL 7.3             PTT Heparin Monitor   98.0  Comment: For Minimal Heparin Infusion, the goal aPTT 64-85 seconds corresponds to an anti-Xa of 0.3-0.5.    For Low Intensity and High Intensity Heparin, the goal aPTT  seconds corresponds to an anti-Xa of 0.3-0.7     78.0  Comment: For Minimal Heparin Infusion, the goal aPTT 64-85 seconds corresponds to an anti-Xa of 0.3-0.5.    For Low Intensity and High Intensity Heparin, the goal aPTT  seconds corresponds to an anti-Xa of 0.3-0.7       RBC 3.98             RDW 16.7             Sodium 137             WBC 8.50

## 2023-07-29 NOTE — ASSESSMENT & PLAN NOTE
Magneisum noted to be 1.3 on admission. Patient got 2g IV replacement      --Replace magnesium as needed  --follow up repeat magnesium level  --mag daily

## 2023-07-29 NOTE — PLAN OF CARE
07/29/23 1722   Readmission   Why were you hospitalized in the last 30 days? Pt. transfer from [facility] Tele[facility]. Admit: 7/24-7/28. Admit to [facility] on 7/28.   Why were you readmitted? Related to previous admission;Planned readmission  (Transfer to an Intermediate Care Facility.)   Was this a planned readmission? Yes     Jessica Grant LMSW

## 2023-07-29 NOTE — HOSPITAL COURSE
72 year-old male with multivessel diseases admitted to CSU 7/28 as transfer from OSH for advanced imaging prior to CABG consideration. Underwent cardiac PET on 7/31 and then heart catheterization with revealed multivessel disease. On 8/1, optimized GDMT and planned for outpatient follow-up with CTS Dr. Garvey  so patient can undergo CABG as an outpatient due to high-risk nature of staged PCI.     Vitals:    08/01/23 0509 08/01/23 0714 08/01/23 1110 08/01/23 1119   BP: (!) 122/58 124/78  128/71   BP Location: Left arm Right arm     Patient Position: Lying Lying  Lying   Pulse: 81 70 72 63   Resp: 18 20  20   Temp: 98.6 °F (37 °C) 98.2 °F (36.8 °C)  96.2 °F (35.7 °C)   TempSrc: Oral Oral  Oral   SpO2: 96% 96%  (!) 92%   Weight: 80.7 kg (177 lb 14.6 oz)      Height:            Physical Exam  Constitutional:       Appearance: Normal appearance.   Cardiovascular:      Rate and Rhythm: Normal rate and regular rhythm.      Pulses: Normal pulses.      Heart sounds: Normal heart sounds.   Pulmonary:      Effort: Pulmonary effort is normal.      Breath sounds: Normal breath sounds.   Abdominal:      General: Abdomen is flat.      Palpations: Abdomen is soft.   Neurological:      General: No focal deficit present.      Mental Status: He is alert and oriented to person, place, and time.

## 2023-07-29 NOTE — ASSESSMENT & PLAN NOTE
TTE from 7/25 showed a severely reduced LVEF  of 25% and moderately to severely reduced right ventricular systolic function. LVEF in 5/9/2023 was noted to be 40%.     Plan:   -Continue coreg 12.5mg BID  - Holding rest of GDMT until final decision for cabg.   - strict I&Os and daily weights  - euvolemic, lasix as needed

## 2023-07-29 NOTE — PROGRESS NOTES
Pt dc to [facility], tele removed, iv in place with hep drip infusing at 17u/kg. Pt stable, family at bedside. [facility] ambulance transporting pt. Report called in to Renita.

## 2023-07-29 NOTE — PLAN OF CARE
[facility] - Cardiology Stepdown  Initial Discharge Assessment       Primary Care Provider: Kingsley Painting Jr, MD    Admission Diagnosis: CAD (coronary artery disease) [I25.10]    Admission Date: 7/28/2023  Expected Discharge Date:     Transition of Care Barriers: (P) None    Payor: MEDICARE / Plan: MEDICARE PART A & B / Product Type: Government /     Extended Emergency Contact Information  Primary Emergency Contact: justine barros  Mobile Phone: [phone]  Relation: Spouse  Preferred language: English   needed? No  Secondary Emergency Contact: severiano barros  Mobile Phone: [phone]  Relation: Son  Preferred language: English   needed? No    Discharge Plan A: (P) Home with family  Discharge Plan B: (P) Home      [facility] - [place], [place] - [address]  [address]  [place] [address]  Phone: [phone] Fax: [phone]      Initial Assessment (most recent)       Adult Discharge Assessment - 07/29/23 1725          Discharge Assessment    Assessment Type Discharge Planning Assessment (P)      Confirmed/corrected address, phone number and insurance Yes (P)      Confirmed Demographics Correct on Facesheet (P)      Source of Information patient;family (P)      Communicated [place] with patient/caregiver Date not available/Unable to determine (P)      Reason For Admission CAD (P)      People in Home grandchild(annelise);child(annelise), adult;spouse (P)      Do you expect to return to your current living situation? Yes (P)      Do you have help at home or someone to help you manage your care at home? Yes (P)      Who are your caregiver(s) and their phone number(s)? Spouse, Justine Barros [phone]; Son, Severiano Barros [phone] (P)      Prior to hospitilization cognitive status: Alert/Oriented (P)      Current cognitive status: Alert/Oriented (P)      Equipment Currently Used at Home cane, straight (P)      Readmission within 30 days? Yes (P)     Planned Transfer To An Intermediate Care Facility.    Patient currently being followed by outpatient case management? No (P)      Do you currently have service(s) that help you manage your care at home? No (P)      Do you take prescription medications? Yes (P)      Do you have prescription coverage? Yes (P)      Coverage Medicare (P)      Do you have any problems affording any of your prescribed medications? No (P)      Is the patient taking medications as prescribed? yes (P)      Who is going to help you get home at discharge? Spouse & Son, Cj (P)      How do you get to doctors appointments? car, drives self (P)      Are you on dialysis? No (P)      Do you take coumadin? No (P)      Discharge Plan A Home with family (P)      Discharge Plan B Home (P)      DME Needed Upon Discharge  none (P)      Discharge Plan discussed with: Patient;Adult children;Spouse/sig other (P)      Transition of Care Barriers None (P)         Physical Activity    On average, how many days per week do you engage in moderate to strenuous exercise (like a brisk walk)? 0 days (P)      On average, how many minutes do you engage in exercise at this level? 0 min (P)         Financial Resource Strain    How hard is it for you to pay for the very basics like food, housing, medical care, and heating? Not hard at all (P)         Housing Stability    In the last 12 months, was there a time when you were not able to pay the mortgage or rent on time? No (P)      In the last 12 months, how many places have you lived? 1 (P)      In the last 12 months, was there a time when you did not have a steady place to sleep or slept in a shelter (including now)? No (P)         Transportation Needs    In the past 12 months, has lack of transportation kept you from medical appointments or from getting medications? No (P)      In the past 12 months, has lack of transportation kept you from meetings, work, or from getting things needed for daily living? No (P)          Food Insecurity    Within the past 12 months, you worried that your food would run out before you got the money to buy more. Never true (P)      Within the past 12 months, the food you bought just didn't last and you didn't have money to get more. Never true (P)         Stress    Do you feel stress - tense, restless, nervous, or anxious, or unable to sleep at night because your mind is troubled all the time - these days? Not at all (P)         Social Connections    In a typical week, how many times do you talk on the phone with family, friends, or neighbors? More than three times a week (P)      How often do you get together with friends or relatives? More than three times a week (P)      How often do you attend Jehovah's witness or Christianity services? More than 4 times per year (P)      Do you belong to any clubs or organizations such as Jehovah's witness groups, unions, fraternal or athletic groups, or school groups? Yes (P)      How often do you attend meetings of the clubs or organizations you belong to? More than 4 times per year (P)      Are you , , , , never , or living with a partner?  (P)         Alcohol Use    Q1: How often do you have a drink containing alcohol? Never (P)      Q2: How many drinks containing alcohol do you have on a typical day when you are drinking? Patient does not drink (P)      Q3: How often do you have six or more drinks on one occasion? Never (P)                      Readmission Assessment (most recent)       Readmission Assessment - 07/29/23 1722          Readmission    Why were you hospitalized in the last 30 days? Pt. transfer from [facility]. Admit: 7/24-7/28. Admit to [facility] on 7/28.     Why were you readmitted? Related to previous admission;Planned readmission   Transfer to an Intermediate Care Facility.    Was this a planned readmission? Yes                        Pt[facility]. DIAMOND met with pt., spouse and son, Cj at the bedside. Pt. Lives with spouse, son, Cj and grandchildren in their home in [place], [place]. The home is a 1-story home with 1 step to enter. Pt. uses a straight cane as needed. No Home Health, Dialysis or Coumadin. Pt. drives, but family will provide transportation home upon discharge. Family is aware that [facility] available in the hospital if family needs a room. Pt. is hoping to be healthy to participate when hunting season begins in the Fall.     Jessica Grant LMSW

## 2023-07-29 NOTE — SUBJECTIVE & OBJECTIVE
Interval History: NAEON. Patient without any chest pain, shortness of breath. Discussed that he was sent to [facility] for further imaging considerations. Patient is on Heparin gtt currently. Patient notes he has not had chest pain in two weeks.    Review of Systems   Constitutional: Negative for chills and fever.   HENT:  Negative for sore throat.    Cardiovascular:  Negative for chest pain, claudication, dyspnea on exertion, leg swelling, near-syncope, orthopnea and palpitations.   Respiratory:  Negative for cough.    Gastrointestinal:  Negative for nausea and vomiting.   Genitourinary:  Negative for dysuria and flank pain.   Neurological:  Negative for headaches and light-headedness.   Psychiatric/Behavioral:  Negative for substance abuse. The patient is not nervous/anxious.      Objective:     Vital Signs (Most Recent):  Temp: 98.4 °F (36.9 °C) (07/29/23 0439)  Pulse: 74 (07/29/23 0805)  Resp: 18 (07/29/23 0439)  BP: (!) 140/62 (07/29/23 0439)  SpO2: (!) 94 % (07/29/23 0439) Vital Signs (24h Range):  Temp:  [97.9 °F (36.6 °C)-98.4 °F (36.9 °C)] 98.4 °F (36.9 °C)  Pulse:  [66-88] 74  Resp:  [16-20] 18  SpO2:  [94 %-97 %] 94 %  BP: (103-140)/(62-77) 140/62     Weight: 79.2 kg (174 lb 9.7 oz)  Body mass index is 29.06 kg/m².     SpO2: (!) 94 %       No intake or output data in the 24 hours ending 07/29/23 0835    Lines/Drains/Airways       Peripheral Intravenous Line  Duration                  Peripheral IV - Single Lumen 07/28/23 0430 20 G Anterior;Right Forearm 1 day                       Physical Exam  Vitals and nursing note reviewed.   Constitutional:       General: He is not in acute distress.     Appearance: He is not ill-appearing, toxic-appearing or diaphoretic.   HENT:      Head: Normocephalic and atraumatic.      Right Ear: External ear normal.      Left Ear: External ear normal.   Eyes:      General:         Right eye: No discharge.         Left eye: No discharge.      Conjunctiva/sclera: Conjunctivae  normal.   Cardiovascular:      Rate and Rhythm: Normal rate and regular rhythm.      Pulses: Normal pulses.      Heart sounds: Normal heart sounds.   Pulmonary:      Effort: Pulmonary effort is normal. No respiratory distress.      Breath sounds: Normal breath sounds.   Abdominal:      General: Abdomen is flat.      Palpations: Abdomen is soft.   Musculoskeletal:      Cervical back: Normal range of motion and neck supple.      Right lower leg: No edema.      Left lower leg: No edema.   Skin:     General: Skin is warm and dry.      Findings: Bruising present.   Neurological:      Mental Status: He is alert and oriented to person, place, and time. Mental status is at baseline.   Psychiatric:         Mood and Affect: Mood normal.         Behavior: Behavior normal.         Thought Content: Thought content normal.         Judgment: Judgment normal.            Significant Labs:   Recent Labs   Lab 07/28/23 0415 07/28/23 2328 07/29/23 0613   GLU  --  92 95    137 136   K 3.6 3.8 3.8   CL  --  101 102   CO2 28 24 24   BUN 11.6 13 11   CREATININE 0.88 0.9 0.8   CALCIUM 9.3 9.1 9.2   MG 1.40* 1.3*  --      Recent Labs   Lab 07/28/23 0415 07/28/23 2328 07/29/23 0613    137 136   K 3.6 3.8 3.8   CL  --  101 102   CO2 28 24 24   GLU  --  92 95   BUN 11.6 13 11   CREATININE 0.88 0.9 0.8   CALCIUM 9.3 9.1 9.2   PROT  --  7.3  --    ALBUMIN  --  3.5  --    BILITOT  --  0.7  --    ALKPHOS  --  132  --    AST  --  32  --    ALT  --  36  --    ANIONGAP  --  12 10     Recent Labs   Lab 07/28/23 0415 07/28/23 2328 07/28/23 2328 07/29/23 0613   WBC 8.66 8.50  --  8.25   HGB 12.4* 12.1*  --  12.9*   HCT 39.0* 37.3*   < > 39.4*    189  --  199    < > = values in this interval not displayed.     Recent Labs   Lab 07/29/23 0613   INR 1.2       Significant Imaging:  Imaging from past 24 hours reviewed.

## 2023-07-29 NOTE — ASSESSMENT & PLAN NOTE
Mr. Barros is a 72-year-old M w/ PMHx CAD s/p prior CABG (vein graft to distal RCA), ICM, HFrEF (LVEF 20%), s/p ICD,  HTN, type 2 who was transferred from OS for further viability study prior to CABG consideration after he presented with angina and found to have mvCAD. LHC done in 7.24.2 showed: Left main - 90%, Prox LAD-1 - 80%, mid lesion - 60%, Lcx - 90% w/ a widely patent RCA graft. TTE from 7/25 showed a severely reduced LVEF of 25% and moderately to severely reduced right ventricular systolic function.    Plan:   - Will arrange for a viability study on Monday  - DAPT: continue ASA and plavix held to allow for washout in case patient is deemed a candidate for bypass after viability study.  - Cont heparin gtt- therapeutic  - Cont high intensity statin   - Cont coreg 12.5mg BID

## 2023-07-30 NOTE — SUBJECTIVE & OBJECTIVE
Interval History: Patient reports no acute events overnight. Patient aware that he is going for PET viability study later on this week when available.     Review of Systems   Constitutional: Negative for decreased appetite, weight gain and weight loss.   Cardiovascular:  Negative for chest pain, orthopnea, palpitations and paroxysmal nocturnal dyspnea.   Respiratory:  Negative for cough, snoring and wheezing.    Genitourinary:  Negative for dysuria, hesitancy and urgency.   Psychiatric/Behavioral:  Negative for altered mental status.      Objective:     Vital Signs (Most Recent):  Temp: 97.7 °F (36.5 °C) (07/30/23 0756)  Pulse: 62 (07/30/23 1101)  Resp: 17 (07/30/23 0424)  BP: 135/68 (07/30/23 0756)  SpO2: 98 % (07/30/23 0756) Vital Signs (24h Range):  Temp:  [96.2 °F (35.7 °C)-97.9 °F (36.6 °C)] 97.7 °F (36.5 °C)  Pulse:  [62-84] 62  Resp:  [16-18] 17  SpO2:  [92 %-98 %] 98 %  BP: ()/(54-73) 135/68     Weight: 78.8 kg (173 lb 11.6 oz)  Body mass index is 28.91 kg/m².     SpO2: 98 %         Intake/Output Summary (Last 24 hours) at 7/30/2023 1116  Last data filed at 7/30/2023 0940  Gross per 24 hour   Intake 660 ml   Output 400 ml   Net 260 ml       Lines/Drains/Airways       Peripheral Intravenous Line  Duration                  Peripheral IV - Single Lumen 07/28/23 0430 20 G Anterior;Right Forearm 2 days                       Physical Exam  Constitutional:       Appearance: Normal appearance.   Cardiovascular:      Rate and Rhythm: Normal rate and regular rhythm.      Pulses: Normal pulses.      Heart sounds: Normal heart sounds.   Pulmonary:      Effort: Pulmonary effort is normal.      Breath sounds: Normal breath sounds.   Abdominal:      General: Abdomen is flat.      Palpations: Abdomen is soft.   Neurological:      General: No focal deficit present.      Mental Status: He is alert and oriented to person, place, and time.            Significant Labs: CMP   Recent Labs   Lab 07/28/23  2328 07/29/23  0613  07/30/23  0520    136 136   K 3.8 3.8 3.5    102 102   CO2 24 24 23   GLU 92 95 102   BUN 13 11 13   CREATININE 0.9 0.8 0.8   CALCIUM 9.1 9.2 8.9   PROT 7.3  --   --    ALBUMIN 3.5  --   --    BILITOT 0.7  --   --    ALKPHOS 132  --   --    AST 32  --   --    ALT 36  --   --    ANIONGAP 12 10 11    and CBC   Recent Labs   Lab 07/28/23  2328 07/29/23  0613   WBC 8.50 8.25   HGB 12.1* 12.9*   HCT 37.3* 39.4*    199       Significant Imaging:  All imaging reviewed

## 2023-07-30 NOTE — PLAN OF CARE
AAOX4,VSS,O2 sats at 98% on Room air. Plan of care discussed with patient and spouse.Patient has no complaints of pain/SOB. Discussed medications and care. Patient or spouse has no questions at this time.Pt visualised and stable.Call light within reach.Pt resting,bed at lowest position.Pt's wife at the bedside.Will continue to monitor through the shift.       Problem: Adult Inpatient Plan of Care  Goal: Plan of Care Review  Outcome: Ongoing, Progressing  Goal: Patient-Specific Goal (Individualized)  Outcome: Ongoing, Progressing  Goal: Absence of Hospital-Acquired Illness or Injury  Outcome: Ongoing, Progressing  Goal: Optimal Comfort and Wellbeing  Outcome: Ongoing, Progressing  Goal: Readiness for Transition of Care  Outcome: Ongoing, Progressing     Problem: Diabetes Comorbidity  Goal: Blood Glucose Level Within Targeted Range  Outcome: Ongoing, Progressing     Problem: Fall Injury Risk  Goal: Absence of Fall and Fall-Related Injury  Outcome: Ongoing, Progressing

## 2023-07-30 NOTE — ASSESSMENT & PLAN NOTE
Mr. Barros is a 72-year-old M w/ PMHx CAD s/p prior CABG (vein graft to distal RCA), ICM, HFrEF (LVEF 20%), s/p ICD,  HTN, type 2 who was transferred from OSH for further viability study prior to CABG consideration after he presented with angina and found to have mvCAD. LHC done in 7.24.2 showed: Left main - 90%, Prox LAD-1 - 80%, mid lesion - 60%, Lcx - 90% w/ a widely patent RCA graft. TTE from 7/25 showed a severely reduced LVEF of 25% and moderately to severely reduced right ventricular systolic function. Patient started on heparin gtt at OSH for therapeutic.     Plan:   - Will attempt to arrange for a viability study on Monday  - DAPT: continue ASA, load with plavix  - discontinued heparin gtt  - Cont high intensity statin   - Cont coreg 12.5mg BID

## 2023-07-30 NOTE — CONSULTS
Consult Note  Cardiothoracic Surgery    Inpatient consult to Cardiothoracic Surgery  Consult performed by: Justo Holloway MD  Consult ordered by: Bay Crespo MD        SUBJECTIVE:     History of Present Illness:  Jason Barros is a 72 y.o. male with PMH of CAD s/p CABG (SVG to distal RCA) and PCI (RCA, proximal and distal LAD), ICM, HFrEF (LVEF 20%), s/p ICD,  HTN, DMII who was transferred from [facility] for further viability study prior to CABG consideration after he presented with angina and found to have mvCAD.     LHC done in 7.24.2 showed: Left main - 90%, Prox LAD-1 - 80%, mid lesion - 60%, Lcx - 90% w/ a widely patent RCA graft. TTE from 7/25 showed a severely reduced LVEF  of 25% and moderately to severely reduced right ventricular systolic function.     Patient has been off Plavix to allow for washout. Transferred on a heparin gtt. He has been chest pain free since his angiogram. Denies SOB, GOMEZ, orthopnea, N/V, or any complaints now.      PMH: PSVT, HTN, PAD, ICMO, systolic CHF, CAD, HLD, DM 2, bladder CA  PSH: colon resection, CABG, penile prosthesis   Family History: Father - CA; Mother - VHD  Social History: Current every day smoker. Denies alcohol or illicit drug use.    Scheduled Meds:   aspirin  81 mg Oral Daily    atorvastatin  80 mg Oral QHS    carvediloL  12.5 mg Oral BID    clopidogreL  75 mg Oral Daily    ferrous sulfate  1 tablet Oral Daily    pantoprazole  40 mg Oral Daily     Infusions/Drips:  PRN Meds:acetaminophen, sodium chloride 0.9%    Review of patient's allergies indicates:   Allergen Reactions    Ranolazine Shortness Of Breath     groggy         Past Medical History:   Diagnosis Date    Arthritis     Diabetes mellitus, type 2     Hypertension      Past Surgical History:   Procedure Laterality Date    LEFT HEART CATHETERIZATION Left 7/24/2023    Procedure: Left heart cath;  Surgeon: Gloria Donald MD;  Location: [facility] CATH LAB;  Service: Cardiology;  Laterality: Left;  C     No  family history on file.  Social History     Tobacco Use    Smoking status: Every Day     Current packs/day: 1.00     Types: Cigarettes    Smokeless tobacco: Never   Substance Use Topics    Alcohol use: Yes    Drug use: Never        Review of Systems:  Review of Systems   Constitutional: Negative.    HENT: Negative.     Eyes: Negative.    Respiratory: Negative.     Cardiovascular:  Positive for chest pain.   Gastrointestinal: Negative.    Genitourinary: Negative.    Musculoskeletal: Negative.    Skin: Negative.    Neurological: Negative.    Psychiatric/Behavioral: Negative.          OBJECTIVE:     Vital Signs (Most Recent)  Temp: 97.8 °F (36.6 °C) (07/30/23 0424)  Pulse: 77 (07/30/23 0424)  Resp: 17 (07/30/23 0424)  BP: 123/70 (07/30/23 0424)  SpO2: (!) 94 % (07/30/23 0424)    Admission Weight: 79.2 kg (174 lb 9.7 oz) (07/28/23 2100)   Most Recent Weight: 78.8 kg (173 lb 11.6 oz) (07/30/23 0424)    Vital Signs Range (Last 24H):  Temp:  [96.2 °F (35.7 °C)-97.9 °F (36.6 °C)]   Pulse:  [70-84]   Resp:  [16-18]   BP: ()/(54-73)   SpO2:  [92 %-98 %]     Physical Exam:  Physical Exam  Vitals and nursing note reviewed.   Constitutional:       Appearance: Normal appearance.   HENT:      Head: Normocephalic.      Nose: Nose normal.   Eyes:      Extraocular Movements: Extraocular movements intact.      Pupils: Pupils are equal, round, and reactive to light.   Cardiovascular:      Rate and Rhythm: Normal rate and regular rhythm.   Pulmonary:      Effort: Pulmonary effort is normal.   Abdominal:      General: Abdomen is flat.      Palpations: Abdomen is soft.   Musculoskeletal:         General: Normal range of motion.      Cervical back: Neck supple.   Skin:     General: Skin is warm.   Neurological:      General: No focal deficit present.      Mental Status: He is alert and oriented to person, place, and time.   Psychiatric:         Mood and Affect: Mood normal.         Behavior: Behavior normal.          Laboratory:  I  have reviewed all pertinent lab results within the past 24 hours.    Diagnostic Results:    [facility] 7/24/23:  Left Main   Mid LM to Dist LM lesion was 90% stenosed. The lesion was calcified. The calcification amount was severe.      Left Anterior Descending   Prox LAD-1 lesion was 80% stenosed.   Previously placed Prox LAD-2 stent (unknown type) is widely patent.   Mid LAD lesion was 60% stenosed.   Previously placed Dist LAD stent (unknown type) is widely patent.      Left Circumflex   Mid Cx lesion was 90% stenosed. The lesion was calcified. The calcification amount was severe.      Right Coronary Artery   Ost RCA to Mid RCA lesion was 100% stenosed. The lesion was previously treated using a stent of unknown type.      Saphenous Graft To Dist RCA   Conduit type: SVG.The graft was visualized by angiography and was moderate in size. The graft was angiographically normal.        TTE 7/25/23:  The left ventricle is severely enlarged with severely decreased systolic function.  The estimated ejection fraction is 25%.  Grade I left ventricular diastolic dysfunction.  Mild to moderate tricuspid regurgitation.  Mild pulmonic regurgitation.  Mild mitral regurgitation.  Moderate right ventricular enlargement with moderately to severely reduced right ventricular systolic function.  Mild left atrial enlargement.  Intermediate central venous pressure (8 mmHg).  The estimated PA systolic pressure is 24 mmHg.    PET (6.23.23):  This is an abnormal perfusion study.   This scan is suggestive of low risk for future cardiovascular events.   Small fixed perfusion abnormality of mild intensity in the apical segment. Small fixed perfusion abnormality of mild intensity in the apical anterior segment.   The left ventricular cavity is noted to be moderately enlarged on the stress studies. The stress left ventricular ejection fraction was calculated to be 24% and left ventricular global function is severely reduced. The rest left ventricular  cavity is noted to be mildly enlarged. The rest left ventricular ejection fraction was calculated to be 23% and rest left ventricular global function is severely reduced.   When compared to the resting ejection fraction (23%), the stress ejection fraction (24%) has increased.   The study quality is good.   There was a rise in myocardial blood flow between rest and stress.  Global myocardial blood flow reserve was 1.46.  Myocardial blood flow reserve is globally abnormal, placing the patient at a higher coronary event risk.    ASSESSMENT/PLAN:   72 y.o. male with PMH of CAD s/p CABG (SVG to distal RCA) and PCI (RCA, proximal and distal LAD), ICM, HFrEF (LVEF 20%), s/p ICD,  HTN, DMII presented with angina. LHC revealed multivessel CAD as detailed above    The patient is not a candidate for surgical revascularization given no suitable distal LAD target for bypass. Recommend evaluation for possible PCI     Tung MD Jewel  Cardiothoracic Surgery Fellow  I have seen the patient and reviewed the fellow's note above. I have personally interviewed and examined the patient at bedside and agree with the findings.     Due to the distal LAD stent, there is no suitable target for coronary artery bypass surgery.  I recommend percutaneous coronary intervention vs medical management.      Michael Sanon MD  Cardiothoracic Surgery  [facility]

## 2023-07-30 NOTE — PROGRESS NOTES
[facility] - Cardiology Stepdown  Cardiology  Progress Note    Patient Name: Jason Barros  MRN: [id number]  Admission Date: 7/28/2023  Hospital Length of Stay: 2 days  Code Status: Full Code   Attending Physician: Alex Dietz MD   Primary Care Physician: Kingsley Painting Jr, MD  Expected Discharge Date:   Principal Problem:CAD, multiple vessel    Subjective:     Hospital Course:   72 year-old male admitted to [facility] as transfer from OSH for advanced imaging prior to CABG consideration. Patient with multi-vessel disease.      Interval History: Patient reports no acute events overnight. Patient aware that he is going for PET viability study later on this week when available.     Review of Systems   Constitutional: Negative for decreased appetite, weight gain and weight loss.   Cardiovascular:  Negative for chest pain, orthopnea, palpitations and paroxysmal nocturnal dyspnea.   Respiratory:  Negative for cough, snoring and wheezing.    Genitourinary:  Negative for dysuria, hesitancy and urgency.   Psychiatric/Behavioral:  Negative for altered mental status.      Objective:     Vital Signs (Most Recent):  Temp: 97.7 °F (36.5 °C) (07/30/23 0756)  Pulse: 62 (07/30/23 1101)  Resp: 17 (07/30/23 0424)  BP: 135/68 (07/30/23 0756)  SpO2: 98 % (07/30/23 0756) Vital Signs (24h Range):  Temp:  [96.2 °F (35.7 °C)-97.9 °F (36.6 °C)] 97.7 °F (36.5 °C)  Pulse:  [62-84] 62  Resp:  [16-18] 17  SpO2:  [92 %-98 %] 98 %  BP: ()/(54-73) 135/68     Weight: 78.8 kg (173 lb 11.6 oz)  Body mass index is 28.91 kg/m².     SpO2: 98 %         Intake/Output Summary (Last 24 hours) at 7/30/2023 1116  Last data filed at 7/30/2023 0940  Gross per 24 hour   Intake 660 ml   Output 400 ml   Net 260 ml       Lines/Drains/Airways       Peripheral Intravenous Line  Duration                  Peripheral IV - Single Lumen 07/28/23 0430 20 G Anterior;Right Forearm 2 days                       Physical Exam  Constitutional:       Appearance: Normal  appearance.   Cardiovascular:      Rate and Rhythm: Normal rate and regular rhythm.      Pulses: Normal pulses.      Heart sounds: Normal heart sounds.   Pulmonary:      Effort: Pulmonary effort is normal.      Breath sounds: Normal breath sounds.   Abdominal:      General: Abdomen is flat.      Palpations: Abdomen is soft.   Neurological:      General: No focal deficit present.      Mental Status: He is alert and oriented to person, place, and time.            Significant Labs: CMP   Recent Labs   Lab 07/28/23 2328 07/29/23  0613 07/30/23  0520    136 136   K 3.8 3.8 3.5    102 102   CO2 24 24 23   GLU 92 95 102   BUN 13 11 13   CREATININE 0.9 0.8 0.8   CALCIUM 9.1 9.2 8.9   PROT 7.3  --   --    ALBUMIN 3.5  --   --    BILITOT 0.7  --   --    ALKPHOS 132  --   --    AST 32  --   --    ALT 36  --   --    ANIONGAP 12 10 11    and CBC   Recent Labs   Lab 07/28/23 2328 07/29/23  0613   WBC 8.50 8.25   HGB 12.1* 12.9*   HCT 37.3* 39.4*    199       Significant Imaging:  All imaging reviewed    Assessment and Plan:       * CAD, multiple vessel  Mr. Barros is a 72-year-old M w/ PMHx CAD s/p prior CABG (vein graft to distal RCA), ICM, HFrEF (LVEF 20%), s/p ICD,  HTN, type 2 who was transferred from OSH for further viability study prior to CABG consideration after he presented with angina and found to have mvCAD. LHC done in 7.24.2 showed: Left main - 90%, Prox LAD-1 - 80%, mid lesion - 60%, Lcx - 90% w/ a widely patent RCA graft. TTE from 7/25 showed a severely reduced LVEF of 25% and moderately to severely reduced right ventricular systolic function. Patient started on heparin gtt at OSH for therapeutic.     Plan:   - Will attempt to arrange for a viability study on Monday  - DAPT: continue ASA, load with plavix  - discontinued heparin gtt  - Cont high intensity statin   - Cont coreg 12.5mg BID    ICD (implantable cardioverter-defibrillator) in place  Noted in history      Hypomagnesemia  Magneisum  noted to be 1.3 on admission. Patient got 2g IV replacement      --Replace magnesium as needed  --follow up repeat magnesium level  --mag daily    Hyperlipidemia  -continue high intensity statin    Type 2 diabetes mellitus  a1C 5.9. ISS as needed    Ischemic cardiomyopathy  Please refer to assessment of mvCAD    Acute on chronic combined systolic and diastolic heart failure  TTE from 7/25 showed a severely reduced LVEF  of 25% and moderately to severely reduced right ventricular systolic function. LVEF in 5/9/2023 was noted to be 40%.     Plan:   -Continue coreg 12.5mg BID  - Holding rest of GDMT until final decision for cabg.   - strict I&Os and daily weights  - euvolemic, lasix as needed        VTE Risk Mitigation (From admission, onward)         Ordered     Reason for no Mechanical VTE Prophylaxis  Once        Question:  Reasons:  Answer:  IV Heparin within 24 hrs. Pre or Post-Op    07/28/23 2313     IP VTE HIGH RISK PATIENT  Once         07/28/23 2319                Elie Betts MD  Cardiology  [facility] - Cardiology Stepdown

## 2023-07-31 NOTE — ASSESSMENT & PLAN NOTE
CAD, multiple vessel  Mr. Barros is a 72-year-old M w/ PMHx CAD s/p prior CABG (vein graft to distal RCA), ICM, HFrEF (LVEF 20%), s/p ICD,  HTN, type 2 who was transferred from [facility] for further viability study prior to CABG consideration after he presented with angina and found to have mvCAD. LHC done in 7.24.2 showed: Left main - 90%, Prox LAD-1 - 80%, mid lesion - 60%, Lcx - 90% w/ a widely patent RCA graft. TTE from 7/25 showed a severely reduced LVEF of 25% and moderately to severely reduced right ventricular systolic function.  PET viability scan:     This is a resting perfusion study.    There is a very small sized, mild intensity, mid septal resting perfusion abnormality involving 2% of the LV myocardium in the distribution of the 1st septal territory.    The remaining walls outside of the defect are normal/viable.    Consider PET stress testing to determine ischemic burden depending on clinical conditions and judgement.    Gated perfusion images showed an ejection fraction of 31%. Normal ejection fraction is greater than 47%.    There are no prior studies for comparison       Plan:   - Evaluation with LHC +/- PCI. Patient is a RICARDO candidate  - Anti-platelet Therapy: aspirin 81, plavix 75  - Access: R femoral  - Allergies: No shellfish / Iodine allergy   - Creatinine: 0.9, platelets 199, INR 1.1  - Pre-Hydration: none  - Pre-Op Med: Bendaryl 50mg pO, diazepam  - All patient's questions were answered.  -The risks, benefits and alternatives of the procedure were explained to the patient.   -The risks of coronary angiography include but are not limited to: bleeding, infection, heart rhythm abnormalities, allergic reactions, kidney injury and potential need for dialysis, stroke and death.   - Should stenting be indicated, the patient has agreed to dual anti-platelet therapy for 1-consecutive year with a drug-eluting stent and a minimum of 1-month with the use of a bare metal stent  - Additionally, pt is  aware that non-compliance is likely to result in stent clotting with heart attack, heart failure, and/or death  -The risks of moderate sedation include hypotension, respiratory depression, arrhythmias, bronchospasm, and death.   - Informed consent was obtained and the  patient is agreeable to proceed with the procedure.

## 2023-07-31 NOTE — CONSULTS
[facility] - Cardiology Stepdown  Interventional Cardiology  Consult Note    Patient Name: Jason Barros  MRN: [id number]  Admission Date: 7/28/2023  Hospital Length of Stay: 3 days  Code Status: Full Code   Attending Provider: Alex Dietz MD  Consulting Provider: Huma Young MD  Primary Care Physician: Kingsley Painting Jr, MD  Principal Problem:CAD, multiple vessel    Patient information was obtained from patient and ER records.     Inpatient consult to Interventional Cardiology  Consult performed by: Huma Young MD  Consult ordered by: Madisyn Rios MD        Subjective:     Chief Complaint:  Angina     HPI:  IC consult: mvCAD    Mr. Barros is a 71 y/o M w/ PMHx CAD s/p prior CABG (vein graft to distal RCA), ICM, HFrEF (LVEF 20%), s/p ICD,  HTN, type 2 who was transferred from [facility] for a viability study prior to CABG vs complex PCI consideration after he was found to have mvCAD. LHC done in 7.24.2 showed: Left main - 90%, Prox LAD-1 - 80%, mid lesion - 60%, Lcx - 90% w/ a widely patent RCA graft. TTE from 7/25 showed a severely reduced LVEF  of 25% and moderately to severely reduced right ventricular systolic function.     Mr. Barros admits stable angina with minimal walking. Denies SOB, edema, syncope, orthopnea or any other complaints. He has been loaded with plavix and ASA. Currently on plavix 75mg qd and asaa 81qd.      PMH: PSVT, HTN, PAD, ICMO, systolic CHF, CAD, HLD, DM 2, bladder CA  PSH: colon resection, CABG, penile prosthesis   Family History: Father - CA; Mother - VHD  Social History: Current every day smoker. Denies alcohol or illicit drug use.     Previous Cardiac Diagnostics:   TTE (7.25.23):  The left ventricle is severely enlarged with severely decreased systolic function.  The estimated ejection fraction is 25%.  Grade I left ventricular diastolic dysfunction.  Mild to moderate tricuspid regurgitation.  Mild pulmonic regurgitation.  Mild mitral  regurgitation.  Moderate right ventricular enlargement with moderately to severely reduced right ventricular systolic function.  Mild left atrial enlargement.  Intermediate central venous pressure (8 mmHg).  The estimated PA systolic pressure is 24 mmHg.     [facility] (7.24.23):  Left Main:     - Mid LM to Dist LM lesion was 90% stenosed. The lesion was calcified. The calcification amount was severe.  LAD:    - Prox LAD-1 lesion was 80% stenosed.    - Previously placed LAD-2 stent (unknown type) is widely patent.    - Mid LAD lesion was 60% stenosed.    - Previously placed Dist LAD stent (unknown type) is widely patent  Left Circumflex:    - Mid Cx lesion was 90% stenosed. The lesion was 100% stenosed. The lesion was previously treated using a stent of unknown type.  Saphenous Graft to Dist RCA    - Conduit type: SVG. The graft was visualized by angiography and was moderate in size. The graft was angiographically normal.          Past Medical History:   Diagnosis Date    Arthritis     Diabetes mellitus, type 2     Hypertension        Past Surgical History:   Procedure Laterality Date    LEFT HEART CATHETERIZATION Left 7/24/2023    Procedure: Left heart cath;  Surgeon: Gloria Donald MD;  Location: [facility] CATH LAB;  Service: Cardiology;  Laterality: Left;  [facility]       Review of patient's allergies indicates:   Allergen Reactions    Ranolazine Shortness Of Breath     groggy         PTA Medications   Medication Sig    atorvastatin (LIPITOR) 80 MG tablet Take 80 mg by mouth once daily.    carvediloL (COREG) 25 MG tablet Take 25 mg by mouth 2 (two) times daily.    clopidogreL (PLAVIX) 75 mg tablet Take 75 mg by mouth.    gabapentin (NEURONTIN) 400 MG capsule Take 400 mg by mouth 3 (three) times daily.    metFORMIN (GLUCOPHAGE) 500 MG tablet Take 1,000 mg by mouth 2 (two) times daily with meals.    mometasone-formoterol (DULERA) 100-5 mcg/actuation HFAA Inhale into the lungs.    oxyCODONE (ROXICODONE) 15 MG Tab TAKE 1  TABLET BY MOUTH EVERY 8 TO 12 HOURS AS NEEDED FOR FOR PAIN    pantoprazole (PROTONIX) 40 MG tablet Take 40 mg by mouth once daily.    solifenacin (VESICARE) 5 MG tablet Take 5 mg by mouth.    iron-vitamin C 100-250 mg, ICAR-C, 100-250 mg Tab Take by mouth once daily.     Family History    None       Tobacco Use    Smoking status: Every Day     Current packs/day: 1.00     Types: Cigarettes    Smokeless tobacco: Never   Substance and Sexual Activity    Alcohol use: Yes    Drug use: Never    Sexual activity: Not on file     ROS  Constitutional: Negative for chills and fever.   HENT:  Negative for sore throat.    Cardiovascular: positive for chest pain, negative for claudication, dyspnea on exertion, leg swelling, near-syncope, orthopnea and palpitations.   Respiratory:  Negative for cough.     Gastrointestinal:  Negative for nausea and vomiting.   Genitourinary:  Negative for dysuria and flank pain.   Neurological:  Negative for headaches and light-headedness.   Psychiatric/Behavioral:  Negative for substance abuse. The patient is not nervous/anxious.    Objective:     Vital Signs (Most Recent):  Temp: 96.5 °F (35.8 °C) (07/31/23 1103)  Pulse: 65 (07/31/23 1121)  Resp: 18 (07/31/23 1103)  BP: 120/60 (07/31/23 1103)  SpO2: (!) 91 % (07/31/23 1103) Vital Signs (24h Range):  Temp:  [96.5 °F (35.8 °C)-98.8 °F (37.1 °C)] 96.5 °F (35.8 °C)  Pulse:  [64-83] 65  Resp:  [16-19] 18  SpO2:  [91 %-99 %] 91 %  BP: (103-124)/(49-72) 120/60     Weight: 79.4 kg (175 lb)  Body mass index is 28.25 kg/m².    SpO2: (!) 91 %         Intake/Output Summary (Last 24 hours) at 7/31/2023 1255  Last data filed at 7/30/2023 1800  Gross per 24 hour   Intake 236 ml   Output 150 ml   Net 86 ml       Lines/Drains/Airways       Peripheral Intravenous Line  Duration                  Peripheral IV - Single Lumen 07/28/23 0430 20 G Anterior;Right Forearm 3 days                     Physical Exam  Vitals and nursing note reviewed.   Constitutional:        General: He is not in acute distress.     Appearance: Normal appearance. He is not ill-appearing, toxic-appearing or diaphoretic.   HENT:      Head: Normocephalic and atraumatic.      Nose: No congestion.      Mouth/Throat:      Mouth: Mucous membranes are moist.      Pharynx: Oropharynx is clear.   Eyes:      Extraocular Movements: Extraocular movements intact.      Conjunctiva/sclera: Conjunctivae normal.   Cardiovascular:      Rate and Rhythm: Normal rate and regular rhythm.      Pulses:           Carotid pulses are 2+ on the right side and 2+ on the left side.       Radial pulses are 2+ on the right side and 2+ on the left side.        Femoral pulses are 2+ on the right side and 2+ on the left side.       Popliteal pulses are 2+ on the right side and 2+ on the left side.        Dorsalis pedis pulses are 2+ on the right side and 2+ on the left side.        Posterior tibial pulses are 2+ on the right side and 2+ on the left side.      Heart sounds: No murmur heard.  Pulmonary:      Effort: Pulmonary effort is normal. No respiratory distress.      Breath sounds: Normal breath sounds.   Abdominal:      General: Abdomen is flat. Bowel sounds are normal. There is no distension.      Palpations: Abdomen is soft.   Musculoskeletal:         General: No swelling or tenderness. Normal range of motion.      Cervical back: Normal range of motion and neck supple.   Skin:     General: Skin is warm.      Capillary Refill: Capillary refill takes less than 2 seconds.   Neurological:      General: No focal deficit present.      Mental Status: He is alert and oriented to person, place, and time.      Motor: No weakness.   Psychiatric:         Mood and Affect: Mood normal.         Thought Content: Thought content normal.            Significant Labs:   Recent Lab Results         07/31/23  1011   07/31/23  0513        Anion Gap   11       BUN   12       Calcium   9.0       Chloride   103       CO2   22       Creatinine   0.9        End diastolic index (mL/m2) 155         End diastolic index (mL/m2) 91         eGFR   >60.0       Ejection Fraction- High Stress 59         End systolic index (mL/m2) 78         End systolic index (mL/m2) 40         Glucose   104       INR   1.1  Comment: Coumadin Therapy:  2.0 - 3.0 for INR for all indicators except mechanical heart valves  and antiphospholipid syndromes which should use 2.5 - 3.5.         Magnesium   1.5       Nuc Rest Diastolic Volume Index 140         Nuc Rest Systolic Volume Index 97         Nuc Rest EF 31         Size in % 2         Potassium   4.2       Protime   12.1       EF + QEF 47         Sodium   136                   Assessment and Plan:     Cardiac/Vascular  * CAD, multiple vessel  CAD, multiple vessel  Mr. Barros is a 72-year-old M w/ PMHx CAD s/p prior CABG (vein graft to distal RCA), ICM, HFrEF (LVEF 20%), s/p ICD,  HTN, type 2 who was transferred from [facility] for further viability study prior to CABG consideration after he presented with angina and found to have mvCAD. LHC done in 7.24.2 showed: Left main - 90%, Prox LAD-1 - 80%, mid lesion - 60%, Lcx - 90% w/ a widely patent RCA graft. TTE from 7/25 showed a severely reduced LVEF of 25% and moderately to severely reduced right ventricular systolic function.  PET viability scan:     This is a resting perfusion study.    There is a very small sized, mild intensity, mid septal resting perfusion abnormality involving 2% of the LV myocardium in the distribution of the 1st septal territory.    The remaining walls outside of the defect are normal/viable.    Consider PET stress testing to determine ischemic burden depending on clinical conditions and judgement.    Gated perfusion images showed an ejection fraction of 31%. Normal ejection fraction is greater than 47%.    There are no prior studies for comparison       Plan:   - Evaluation with LHC +/- PCI. Patient is a RICARDO candidate  - Anti-platelet Therapy: aspirin 81, plavix  75  - Access: R femoral  - Allergies: No shellfish / Iodine allergy   - Creatinine: 0.9, platelets 199, INR 1.1  - Pre-Hydration: none  - Pre-Op Med: Bendaryl 50mg pO, diazepam  - All patient's questions were answered.  -The risks, benefits and alternatives of the procedure were explained to the patient.   -The risks of coronary angiography include but are not limited to: bleeding, infection, heart rhythm abnormalities, allergic reactions, kidney injury and potential need for dialysis, stroke and death.   - Should stenting be indicated, the patient has agreed to dual anti-platelet therapy for 1-consecutive year with a drug-eluting stent and a minimum of 1-month with the use of a bare metal stent  - Additionally, pt is aware that non-compliance is likely to result in stent clotting with heart attack, heart failure, and/or death  -The risks of moderate sedation include hypotension, respiratory depression, arrhythmias, bronchospasm, and death.   - Informed consent was obtained and the  patient is agreeable to proceed with the procedure.               VTE Risk Mitigation (From admission, onward)         Ordered     enoxaparin injection 40 mg  Every 24 hours         07/31/23 1006     Reason for no Mechanical VTE Prophylaxis  Once        Question:  Reasons:  Answer:  IV Heparin within 24 hrs. Pre or Post-Op    07/28/23 2313     IP VTE HIGH RISK PATIENT  Once         07/28/23 2319                Thank you for your consult.     Huma Young MD  Interventional Cardiology   [facility] - Cardiology Stepdown

## 2023-07-31 NOTE — SUBJECTIVE & OBJECTIVE
Interval History: VSS, NAEON. No CP or palpitations. Interventional cardiology saw patient this AM and discussed that patient not a candidate for CABG. To undergo PET viability study to assess candidacy for revascularization.     Review of Systems   Constitutional: Negative for decreased appetite, weight gain and weight loss.   Cardiovascular:  Negative for chest pain, orthopnea, palpitations and paroxysmal nocturnal dyspnea.   Respiratory:  Negative for cough, snoring and wheezing.    Genitourinary:  Negative for dysuria, hesitancy and urgency.   Psychiatric/Behavioral:  Negative for altered mental status.      Objective:     Vital Signs (Most Recent):  Temp: 97.9 °F (36.6 °C) (07/31/23 0738)  Pulse: 65 (07/31/23 0738)  Resp: 18 (07/31/23 0738)  BP: 113/65 (07/31/23 0826)  SpO2: 95 % (07/31/23 0738) Vital Signs (24h Range):  Temp:  [97.6 °F (36.4 °C)-98.8 °F (37.1 °C)] 97.9 °F (36.6 °C)  Pulse:  [61-83] 65  Resp:  [16-19] 18  SpO2:  [95 %-99 %] 95 %  BP: (113-124)/(59-72) 113/65     Weight: 78.6 kg (173 lb 4.5 oz)  Body mass index is 28.84 kg/m².     SpO2: 95 %         Intake/Output Summary (Last 24 hours) at 7/31/2023 0827  Last data filed at 7/30/2023 1800  Gross per 24 hour   Intake 1069 ml   Output 400 ml   Net 669 ml       Lines/Drains/Airways       Peripheral Intravenous Line  Duration                  Peripheral IV - Single Lumen 07/28/23 0430 20 G Anterior;Right Forearm 3 days                       Physical Exam  Constitutional:       Appearance: Normal appearance.   Cardiovascular:      Rate and Rhythm: Normal rate and regular rhythm.      Pulses: Normal pulses.      Heart sounds: Normal heart sounds.   Pulmonary:      Effort: Pulmonary effort is normal.      Breath sounds: Normal breath sounds.   Abdominal:      General: Abdomen is flat.      Palpations: Abdomen is soft.   Neurological:      General: No focal deficit present.      Mental Status: He is alert and oriented to person, place, and time.             Significant Labs: All pertinent lab results from the last 24 hours have been reviewed.    Significant Imaging: All pertinent cardiac imaging over last 24 hr reviewed.

## 2023-07-31 NOTE — BRIEF OP NOTE
"    Post Cath Note  Referring Physician: Alex Dietz MD  Procedure: ANGIOGRAM, CORONARY ARTERY (Left), IVUS, Coronary      : Alex Dietz MD     Referring Physician: Brittany Galloway     All Operators: Surgeon(s):  MD Osiel Shepherd MD Arthur P Davis, MD James S. Jenkins, MD     Preoperative Diagnosis: CAD (coronary artery disease) [I25.10]     Postop Diagnosis: CAD (coronary artery disease) [I25.10]    Treatments/Procedures: Procedure(s) (LRB):  ANGIOGRAM, CORONARY ARTERY (Left)  IVUS, Coronary    Estimated Blood loss: <50 cc         Access: Right CFA    See full report for further details    Intervention:   Successful coronary angiogram. Has MVD and will need staged PCI with LV support.   Closure device: Perclosure failed due to heavily calcified CFA. Held manual pressure after protamine.     Post Cath Exam:   /60 (Patient Position: Lying)   Pulse 81   Temp 96.5 °F (35.8 °C) (Oral)   Resp 18   Ht 5' 6" (1.676 m)   Wt 79.4 kg (175 lb)   SpO2 (!) 91%   BMI 28.25 kg/m²   No unusual pain, hematoma, thrill or bruit at vascular access site.  Distal pulse present without signs of ischemia.    Recommendations:   - Routine post-cath care  - IVF at  100 ml/hr  for 2 hrs  - See full report for details  - Will plan for LV supported staged PCI    Osiel Cuadra    "

## 2023-07-31 NOTE — PROGRESS NOTES
[facility] - Cardiology Stepdown  Cardiology  Progress Note    Patient Name: Jason Barros  MRN: [id number]  Admission Date: 7/28/2023  Hospital Length of Stay: 3 days  Code Status: Full Code   Attending Physician: Alex Dietz MD   Primary Care Physician: Kingsley Painting Jr, MD  Expected Discharge Date:   Principal Problem:CAD, multiple vessel    Subjective:     Hospital Course:   72 year-old male admitted to [facility] as transfer from OSH for advanced imaging prior to CABG consideration. Patient with multi-vessel disease.      Interval History: VSS, NAEON. No CP or palpitations. Interventional cardiology saw patient this AM and discussed that patient not a candidate for CABG. To undergo PET viability study to assess candidacy for revascularization.     Review of Systems   Constitutional: Negative for decreased appetite, weight gain and weight loss.   Cardiovascular:  Negative for chest pain, orthopnea, palpitations and paroxysmal nocturnal dyspnea.   Respiratory:  Negative for cough, snoring and wheezing.    Genitourinary:  Negative for dysuria, hesitancy and urgency.   Psychiatric/Behavioral:  Negative for altered mental status.      Objective:     Vital Signs (Most Recent):  Temp: 97.9 °F (36.6 °C) (07/31/23 0738)  Pulse: 65 (07/31/23 0738)  Resp: 18 (07/31/23 0738)  BP: 113/65 (07/31/23 0826)  SpO2: 95 % (07/31/23 0738) Vital Signs (24h Range):  Temp:  [97.6 °F (36.4 °C)-98.8 °F (37.1 °C)] 97.9 °F (36.6 °C)  Pulse:  [61-83] 65  Resp:  [16-19] 18  SpO2:  [95 %-99 %] 95 %  BP: (113-124)/(59-72) 113/65     Weight: 78.6 kg (173 lb 4.5 oz)  Body mass index is 28.84 kg/m².     SpO2: 95 %         Intake/Output Summary (Last 24 hours) at 7/31/2023 0827  Last data filed at 7/30/2023 1800  Gross per 24 hour   Intake 1069 ml   Output 400 ml   Net 669 ml       Lines/Drains/Airways       Peripheral Intravenous Line  Duration                  Peripheral IV - Single Lumen 07/28/23 0430 20 G Anterior;Right Forearm 3 days                        Physical Exam  Constitutional:       Appearance: Normal appearance.   Cardiovascular:      Rate and Rhythm: Normal rate and regular rhythm.      Pulses: Normal pulses.      Heart sounds: Normal heart sounds.   Pulmonary:      Effort: Pulmonary effort is normal.      Breath sounds: Normal breath sounds.   Abdominal:      General: Abdomen is flat.      Palpations: Abdomen is soft.   Neurological:      General: No focal deficit present.      Mental Status: He is alert and oriented to person, place, and time.            Significant Labs: All pertinent lab results from the last 24 hours have been reviewed.    Significant Imaging: All pertinent cardiac imaging over last 24 hr reviewed.     Assessment and Plan:     Brief HPI: Mr. Barros is a 72-year-old M w/ PMHx CAD s/p prior CABG (vein graft to distal RCA), ICM, HFrEF (LVEF 20%), s/p ICD,  HTN, type 2 who was transferred from OSH for further viability study prior to CABG consideration after he presented with angina and found to have mvCAD    * CAD, multiple vessel  Mr. Barros is a 72-year-old M w/ PMHx CAD s/p prior CABG (vein graft to distal RCA), ICM, HFrEF (LVEF 20%), s/p ICD,  HTN, type 2 who was transferred from OSH for further viability study prior to CABG consideration after he presented with angina and found to have mvCAD. LHC done in 7.24.2 showed: Left main - 90%, Prox LAD-1 - 80%, mid lesion - 60%, Lcx - 90% w/ a widely patent RCA graft. TTE from 7/25 showed a severely reduced LVEF of 25% and moderately to severely reduced right ventricular systolic function. Patient started on heparin gtt at OSH for therapeutic.     Plan:   - partial viability study today 7/31, will perform FDG portion if inconclusive   - no carbohydrate diet to optimize potential FDG portion of cardiac PET  - optimize GDMT: cont coreg 12.5mg BID, begin valsartan 40 bid today and assess tolerability, consider adding spironolactone tomorrow 8/1  - DAPT: continue ASA,  load with plavix  - discontinued heparin gtt  - Cont high intensity statin       ICD (implantable cardioverter-defibrillator) in place  Noted in history      Hypomagnesemia  Magneisum noted to be 1.3 on admission. Patient got 2g IV replacement      --Replace magnesium as needed  --follow up repeat magnesium level  --mag daily    Hyperlipidemia  -continue high intensity statin    Type 2 diabetes mellitus  a1C 5.9. ISS as needed    Ischemic cardiomyopathy  Please refer to assessment of mvCAD    Acute on chronic combined systolic and diastolic heart failure  TTE from 7/25 showed a severely reduced LVEF  of 25% and moderately to severely reduced right ventricular systolic function. LVEF in 5/9/2023 was noted to be 40%.     Plan:   -Continue coreg 12.5mg BID  - starting valsartan 40 bid to begin GDMT, will consider adding spironolactone    - strict I&Os and daily weights  - euvolemic, lasix as needed        VTE Risk Mitigation (From admission, onward)         Ordered     enoxaparin injection 40 mg  Every 24 hours         07/31/23 1006     Reason for no Mechanical VTE Prophylaxis  Once        Question:  Reasons:  Answer:  IV Heparin within 24 hrs. Pre or Post-Op    07/28/23 2313     IP VTE HIGH RISK PATIENT  Once         07/28/23 2313                Saadia Cheney MD  Cardiology  [facility] - Cardiology Stepdown

## 2023-07-31 NOTE — ASSESSMENT & PLAN NOTE
Mr. Barros is a 72-year-old M w/ PMHx CAD s/p prior CABG (vein graft to distal RCA), ICM, HFrEF (LVEF 20%), s/p ICD,  HTN, type 2 who was transferred from OSH for further viability study prior to CABG consideration after he presented with angina and found to have mvCAD. LHC done in 7.24.2 showed: Left main - 90%, Prox LAD-1 - 80%, mid lesion - 60%, Lcx - 90% w/ a widely patent RCA graft. TTE from 7/25 showed a severely reduced LVEF of 25% and moderately to severely reduced right ventricular systolic function. Patient started on heparin gtt at OSH for therapeutic.     Plan:   - partial viability study today 7/31, will perform FDG portion if inconclusive   - no carbohydrate diet to optimize potential FDG portion of cardiac PET  - optimize GDMT: cont coreg 12.5mg BID, begin valsartan 40 bid today and assess tolerability, consider adding spironolactone tomorrow 8/1  - DAPT: continue ASA, load with plavix  - discontinued heparin gtt  - Cont high intensity statin

## 2023-07-31 NOTE — HPI
IC consult: mvCAD    Mr. Barros is a 73 y/o M w/ PMHx CAD s/p prior CABG (vein graft to distal RCA), ICM, HFrEF (LVEF 20%), s/p ICD,  HTN, type 2 who was transferred from [facility] for a viability study prior to CABG vs complex PCI consideration after he was found to have mvCAD. LHC done in 7.24.2 showed: Left main - 90%, Prox LAD-1 - 80%, mid lesion - 60%, Lcx - 90% w/ a widely patent RCA graft. TTE from 7/25 showed a severely reduced LVEF  of 25% and moderately to severely reduced right ventricular systolic function.     Mr. Barros admits stable angina with minimal walking. Denies SOB, edema, syncope, orthopnea or any other complaints. He has been loaded with plavix and ASA. Currently on plavix 75mg qd and asaa 81qd.      PMH: PSVT, HTN, PAD, ICMO, systolic CHF, CAD, HLD, DM 2, bladder CA  PSH: colon resection, CABG, penile prosthesis   Family History: Father - CA; Mother - VHD  Social History: Current every day smoker. Denies alcohol or illicit drug use.     Previous Cardiac Diagnostics:   TTE (7.25.23):  The left ventricle is severely enlarged with severely decreased systolic function.  The estimated ejection fraction is 25%.  Grade I left ventricular diastolic dysfunction.  Mild to moderate tricuspid regurgitation.  Mild pulmonic regurgitation.  Mild mitral regurgitation.  Moderate right ventricular enlargement with moderately to severely reduced right ventricular systolic function.  Mild left atrial enlargement.  Intermediate central venous pressure (8 mmHg).  The estimated PA systolic pressure is 24 mmHg.     LHC (7.24.23):  Left Main:     - Mid LM to Dist LM lesion was 90% stenosed. The lesion was calcified. The calcification amount was severe.  LAD:    - Prox LAD-1 lesion was 80% stenosed.    - Previously placed LAD-2 stent (unknown type) is widely patent.    - Mid LAD lesion was 60% stenosed.    - Previously placed Dist LAD stent (unknown type) is widely patent  Left Circumflex:    - Mid Cx lesion was 90%  stenosed. The lesion was 100% stenosed. The lesion was previously treated using a stent of unknown type.  Saphenous Graft to Dist RCA    - Conduit type: SVG. The graft was visualized by angiography and was moderate in size. The graft was angiographically normal.

## 2023-07-31 NOTE — SUBJECTIVE & OBJECTIVE
Past Medical History:   Diagnosis Date    Arthritis     Diabetes mellitus, type 2     Hypertension        Past Surgical History:   Procedure Laterality Date    LEFT HEART CATHETERIZATION Left 7/24/2023    Procedure: Left heart cath;  Surgeon: Gloria Donald MD;  Location: [facility] CATH LAB;  Service: Cardiology;  Laterality: Left;  [facility]       Review of patient's allergies indicates:   Allergen Reactions    Ranolazine Shortness Of Breath     groggy         PTA Medications   Medication Sig    atorvastatin (LIPITOR) 80 MG tablet Take 80 mg by mouth once daily.    carvediloL (COREG) 25 MG tablet Take 25 mg by mouth 2 (two) times daily.    clopidogreL (PLAVIX) 75 mg tablet Take 75 mg by mouth.    gabapentin (NEURONTIN) 400 MG capsule Take 400 mg by mouth 3 (three) times daily.    metFORMIN (GLUCOPHAGE) 500 MG tablet Take 1,000 mg by mouth 2 (two) times daily with meals.    mometasone-formoterol (DULERA) 100-5 mcg/actuation HFAA Inhale into the lungs.    oxyCODONE (ROXICODONE) 15 MG Tab TAKE 1 TABLET BY MOUTH EVERY 8 TO 12 HOURS AS NEEDED FOR FOR PAIN    pantoprazole (PROTONIX) 40 MG tablet Take 40 mg by mouth once daily.    solifenacin (VESICARE) 5 MG tablet Take 5 mg by mouth.    iron-vitamin C 100-250 mg, ICAR-C, 100-250 mg Tab Take by mouth once daily.     Family History    None       Tobacco Use    Smoking status: Every Day     Current packs/day: 1.00     Types: Cigarettes    Smokeless tobacco: Never   Substance and Sexual Activity    Alcohol use: Yes    Drug use: Never    Sexual activity: Not on file     ROS  Constitutional: Negative for chills and fever.   HENT:  Negative for sore throat.    Cardiovascular: positive for chest pain, negative for claudication, dyspnea on exertion, leg swelling, near-syncope, orthopnea and palpitations.   Respiratory:  Negative for cough.     Gastrointestinal:  Negative for nausea and vomiting.   Genitourinary:  Negative for dysuria and flank pain.   Neurological:  Negative for headaches  and light-headedness.   Psychiatric/Behavioral:  Negative for substance abuse. The patient is not nervous/anxious.    Objective:     Vital Signs (Most Recent):  Temp: 96.5 °F (35.8 °C) (07/31/23 1103)  Pulse: 65 (07/31/23 1121)  Resp: 18 (07/31/23 1103)  BP: 120/60 (07/31/23 1103)  SpO2: (!) 91 % (07/31/23 1103) Vital Signs (24h Range):  Temp:  [96.5 °F (35.8 °C)-98.8 °F (37.1 °C)] 96.5 °F (35.8 °C)  Pulse:  [64-83] 65  Resp:  [16-19] 18  SpO2:  [91 %-99 %] 91 %  BP: (103-124)/(49-72) 120/60     Weight: 79.4 kg (175 lb)  Body mass index is 28.25 kg/m².    SpO2: (!) 91 %         Intake/Output Summary (Last 24 hours) at 7/31/2023 1255  Last data filed at 7/30/2023 1800  Gross per 24 hour   Intake 236 ml   Output 150 ml   Net 86 ml       Lines/Drains/Airways       Peripheral Intravenous Line  Duration                  Peripheral IV - Single Lumen 07/28/23 0430 20 G Anterior;Right Forearm 3 days                     Physical Exam  Vitals and nursing note reviewed.   Constitutional:       General: He is not in acute distress.     Appearance: Normal appearance. He is not ill-appearing, toxic-appearing or diaphoretic.   HENT:      Head: Normocephalic and atraumatic.      Nose: No congestion.      Mouth/Throat:      Mouth: Mucous membranes are moist.      Pharynx: Oropharynx is clear.   Eyes:      Extraocular Movements: Extraocular movements intact.      Conjunctiva/sclera: Conjunctivae normal.   Cardiovascular:      Rate and Rhythm: Normal rate and regular rhythm.      Pulses:           Carotid pulses are 2+ on the right side and 2+ on the left side.       Radial pulses are 2+ on the right side and 2+ on the left side.        Femoral pulses are 2+ on the right side and 2+ on the left side.       Popliteal pulses are 2+ on the right side and 2+ on the left side.        Dorsalis pedis pulses are 2+ on the right side and 2+ on the left side.        Posterior tibial pulses are 2+ on the right side and 2+ on the left side.       Heart sounds: No murmur heard.  Pulmonary:      Effort: Pulmonary effort is normal. No respiratory distress.      Breath sounds: Normal breath sounds.   Abdominal:      General: Abdomen is flat. Bowel sounds are normal. There is no distension.      Palpations: Abdomen is soft.   Musculoskeletal:         General: No swelling or tenderness. Normal range of motion.      Cervical back: Normal range of motion and neck supple.   Skin:     General: Skin is warm.      Capillary Refill: Capillary refill takes less than 2 seconds.   Neurological:      General: No focal deficit present.      Mental Status: He is alert and oriented to person, place, and time.      Motor: No weakness.   Psychiatric:         Mood and Affect: Mood normal.         Thought Content: Thought content normal.            Significant Labs:   Recent Lab Results         07/31/23  1011   07/31/23  0513        Anion Gap   11       BUN   12       Calcium   9.0       Chloride   103       CO2   22       Creatinine   0.9       End diastolic index (mL/m2) 155         End diastolic index (mL/m2) 91         eGFR   >60.0       Ejection Fraction- High Stress 59         End systolic index (mL/m2) 78         End systolic index (mL/m2) 40         Glucose   104       INR   1.1  Comment: Coumadin Therapy:  2.0 - 3.0 for INR for all indicators except mechanical heart valves  and antiphospholipid syndromes which should use 2.5 - 3.5.         Magnesium   1.5       Nuc Rest Diastolic Volume Index 140         Nuc Rest Systolic Volume Index 97         Nuc Rest EF 31         Size in % 2         Potassium   4.2       Protime   12.1       EF + QEF 47         Sodium   136

## 2023-07-31 NOTE — ASSESSMENT & PLAN NOTE
TTE from 7/25 showed a severely reduced LVEF  of 25% and moderately to severely reduced right ventricular systolic function. LVEF in 5/9/2023 was noted to be 40%.     Plan:   -Continue coreg 12.5mg BID  - starting valsartan 40 bid to begin GDMT, will consider adding spironolactone    - strict I&Os and daily weights  - euvolemic, lasix as needed

## 2023-08-01 NOTE — ASSESSMENT & PLAN NOTE
Magneisum noted to be 1.3 on admission.       --oral Mg supplementation as outpatient due to persistently low levels through hospitilization

## 2023-08-01 NOTE — TELEPHONE ENCOUNTER
Attempted call to pt to discuss surgery with Dr. Garvey. No answer, left VM with request for call back and provided my direct line.

## 2023-08-01 NOTE — ASSESSMENT & PLAN NOTE
Mr. Barros is a 72-year-old M w/ PMHx CAD s/p prior CABG (vein graft to distal RCA), ICM, HFrEF (LVEF 20%), s/p ICD,  HTN, type 2 who was transferred from OSH for further viability study prior to CABG consideration after he presented with angina and found to have mvCAD. LHC done in 7.24.2 showed: Left main - 90%, Prox LAD-1 - 80%, mid lesion - 60%, Lcx - 90% w/ a widely patent RCA graft. TTE from 7/25 showed a severely reduced LVEF of 25% and moderately to severely reduced right ventricular systolic function. Patient started on heparin gtt at OSH for therapeutic. Heart cath 7/31 showed 90% stenosis LM, LAD, and CX.     Plan:   - outpatient CABG for severe mvCAD  - optimize GDMT  - DAPT: continue ASA, load with plavix  - discontinued heparin gtt  - Cont high intensity statin

## 2023-08-01 NOTE — DISCHARGE SUMMARY
[facility] - Cardiology Stepdown  Cardiology  Discharge Summary      Patient Name: Jason Barros  MRN: [id number]  Admission Date: 7/28/2023  Hospital Length of Stay: 4 days  Discharge Date and Time:  08/01/2023 12:43 PM  Attending Physician: Alex Dietz MD    Discharging Provider: Saadia Cheney MD  Primary Care Physician: Kingsley Painting Jr, MD    HPI:   Mr. Barros is a 72-year-old M w/ PMHx CAD s/p prior CABG (vein graft to distal RCA), ICM, HFrEF (LVEF 20%), s/p ICD,  HTN, type 2 who was transferred from [facility] for further viability study prior to CABG consideration after he presented with angina and found to have mvCAD. LHC done in 7.24.2 showed: Left main - 90%, Prox LAD-1 - 80%, mid lesion - 60%, Lcx - 90% w/ a widely patent RCA graft. TTE from 7/25 showed a severely reduced LVEF  of 25% and moderately to severely reduced right ventricular systolic function.    Patient has been off Plavix to allow for washout. Transferred on a heparin gtt. He has been chest pain free since his angiogram. Denies SOB, GOMEZ, orthopnea, N/V, or any complaints now.     PMH: PSVT, HTN, PAD, ICMO, systolic CHF, CAD, HLD, DM 2, bladder CA  PSH: colon resection, CABG, penile prosthesis   Family History: Father - CA; Mother - VHD  Social History: Current every day smoker. Denies alcohol or illicit drug use.    Previous Cardiac Diagnostics:   TTE (7.25.23):  The left ventricle is severely enlarged with severely decreased systolic function.  The estimated ejection fraction is 25%.  Grade I left ventricular diastolic dysfunction.  Mild to moderate tricuspid regurgitation.  Mild pulmonic regurgitation.  Mild mitral regurgitation.  Moderate right ventricular enlargement with moderately to severely reduced right ventricular systolic function.  Mild left atrial enlargement.  Intermediate central venous pressure (8 mmHg).  The estimated PA systolic pressure is 24 mmHg.     Carotid US (7.25.23):  The right internal carotid artery  demonstrated less than 50% stenosis.  The left internal carotid artery demonstrated less than 50% stenosis.  Bilateral vertebral arteries were patent with antegrade flow.     C (7.24.23):  Left Main:     - Mid LM to Dist LM lesion was 90% stenosed. The lesion was calcified. The calcification amount was severe.  LAD:    - Prox LAD-1 lesion was 80% stenosed.    - Previously placed LAD-2 stent (unknown type) is widely patent.    - Mid LAD lesion was 60% stenosed.    - Previously placed Dist LAD stent (unknown type) is widely patent  Left Circumflex:    - Mid Cx lesion was 90% stenosed. The lesion was 100% stenosed. The lesion was previously treated using a stent of unknown type.  Saphenous Graft to Dist RCA    - Conduit type: SVG. The graft was visualized by angiography and was moderate in size. The graft was angiographically normal.      PET (6.23.23):  This is an abnormal perfusion study.   This scan is suggestive of low risk for future cardiovascular events.   Small fixed perfusion abnormality of mild intensity in the apical segment. Small fixed perfusion abnormality of mild intensity in the apical anterior segment.   The left ventricular cavity is noted to be moderately enlarged on the stress studies. The stress left ventricular ejection fraction was calculated to be 24% and left ventricular global function is severely reduced. The rest left ventricular cavity is noted to be mildly enlarged. The rest left ventricular ejection fraction was calculated to be 23% and rest left ventricular global function is severely reduced.   When compared to the resting ejection fraction (23%), the stress ejection fraction (24%) has increased.   The study quality is good.   There was a rise in myocardial blood flow between rest and stress.  Global myocardial blood flow reserve was 1.46.  Myocardial blood flow reserve is globally abnormal, placing the patient at a higher coronary event risk.     TTE (5.9.23):  The study quality is  average.   Global left ventricular systolic function is mildly decreased. The left ventricular ejection fraction is 40%.   Mild (1+) mitral regurgitation.  The pulmonary artery systolic pressure is 18 mmHg.           Procedure(s) (LRB):  ANGIOGRAM, CORONARY ARTERY (Left)  IVUS, Coronary     Indwelling Lines/Drains at time of discharge:  Lines/Drains/Airways     None                 Hospital Course:  72 year-old male with multivessel diseases admitted to [facility] 7/28 as transfer from OSH for advanced imaging prior to CABG consideration. Underwent cardiac PET on 7/31 and then heart catheterization with revealed multivessel disease. On 8/1, optimized GDMT and planned for outpatient follow-up with CTS Dr. Garvey  so patient can undergo CABG as an outpatient due to high-risk nature of staged PCI.     Vitals:    08/01/23 0509 08/01/23 0714 08/01/23 1110 08/01/23 1119   BP: (!) 122/58 124/78  128/71   BP Location: Left arm Right arm     Patient Position: Lying Lying  Lying   Pulse: 81 70 72 63   Resp: 18 20  20   Temp: 98.6 °F (37 °C) 98.2 °F (36.8 °C)  96.2 °F (35.7 °C)   TempSrc: Oral Oral  Oral   SpO2: 96% 96%  (!) 92%   Weight: 80.7 kg (177 lb 14.6 oz)      Height:            Physical Exam  Constitutional:       Appearance: Normal appearance.   Cardiovascular:      Rate and Rhythm: Normal rate and regular rhythm.      Pulses: Normal pulses.      Heart sounds: Normal heart sounds.   Pulmonary:      Effort: Pulmonary effort is normal.      Breath sounds: Normal breath sounds.   Abdominal:      General: Abdomen is flat.      Palpations: Abdomen is soft.   Neurological:      General: No focal deficit present.      Mental Status: He is alert and oriented to person, place, and time.      Goals of Care Treatment Preferences:  Code Status: Full Code      Consults:   Consults (From admission, onward)        Status Ordering Provider     Inpatient consult to Cardiothoracic Surgery  Once        Provider:  (Not yet assigned)     "Completed ANKIT CAMARENA     Inpatient consult to Interventional Cardiology  Once        Provider:  (Not yet assigned)    Completed YISEL PETTIT     Inpatient consult to Cardiothoracic Surgery  Once        Provider:  (Not yet assigned)    Completed ROGERIO MARQUIS          Significant Diagnostic Studies: Labs:   CMP   Recent Labs   Lab 07/31/23  0513 08/01/23  0301    136   K 4.2 4.4    106   CO2 22* 21*    89   BUN 12 13   CREATININE 0.9 0.9   CALCIUM 9.0 8.6*   ANIONGAP 11 9   , CBC No results for input(s): "WBC", "HGB", "HCT", "PLT" in the last 48 hours. and INR   Lab Results   Component Value Date    INR 1.2 08/01/2023    INR 1.1 07/31/2023    INR 1.2 07/30/2023    PROTIME 16.1 (H) 07/25/2023    PROTIME 16.1 (H) 08/02/2019    PROTIME 16.0 (H) 08/01/2019       Pending Diagnostic Studies:     None          Final Active Diagnoses:    Diagnosis Date Noted POA    PRINCIPAL PROBLEM:  CAD, multiple vessel [I25.10] 07/29/2023 Yes    Acute on chronic combined systolic and diastolic heart failure [I50.43] 07/29/2023 Yes    Ischemic cardiomyopathy [I25.5] 07/29/2023 Yes    Type 2 diabetes mellitus [E11.9] 07/29/2023 Yes    Hyperlipidemia [E78.5] 07/29/2023 Yes    Hypomagnesemia [E83.42] 07/29/2023 Yes    ICD (implantable cardioverter-defibrillator) in place [Z95.810] 07/29/2023 Yes      Problems Resolved During this Admission:     Cardiac/Vascular  * CAD, multiple vessel  Mr. Barros is a 72-year-old M w/ PMHx CAD s/p prior CABG (vein graft to distal RCA), ICM, HFrEF (LVEF 20%), s/p ICD,  HTN, type 2 who was transferred from [facility] for further viability study prior to CABG consideration after he presented with angina and found to have mvCAD. LHC done in 7.24.2 showed: Left main - 90%, Prox LAD-1 - 80%, mid lesion - 60%, Lcx - 90% w/ a widely patent RCA graft. TTE from 7/25 showed a severely reduced LVEF of 25% and moderately to severely reduced right ventricular systolic " function. Patient started on heparin gtt at OSH for therapeutic. Heart cath 7/31 showed 90% stenosis LM, LAD, and CX.     Plan:   - outpatient CABG for severe mvCAD  - optimize GDMT  - DAPT: continue ASA, load with plavix  - discontinued heparin gtt  - Cont high intensity statin       Acute on chronic combined systolic and diastolic heart failure  TTE from 7/25 showed a severely reduced LVEF  of 25% and moderately to severely reduced right ventricular systolic function. LVEF in 5/9/2023 was noted to be 40%.     Plan:   - optimize GDMT    - strict I&Os and daily weights  - euvolemic, lasix as needed    Renal/  Hypomagnesemia  Magneisum noted to be 1.3 on admission.       --oral Mg supplementation as outpatient due to persistently low levels through hospitilization        Discharged Condition: stable    Disposition: Home or Self Care    Follow Up: Dr. Garvey     Patient Instructions:   No discharge procedures on file.  Medications:  Reconciled Home Medications:      Medication List      START taking these medications    aspirin 81 MG Chew  Chew and swallow 1 tablet (81 mg total) by mouth once daily.  Start taking on: August 2, 2023     magnesium oxide 400 mg (241.3 mg magnesium) tablet  Commonly known as: MAG-OX  Take 1 tablet (400 mg total) by mouth once daily.     metoprolol succinate 25 MG 24 hr tablet  Commonly known as: TOPROL-XL  Take 1 tablet (25 mg total) by mouth once daily.  Start taking on: August 2, 2023     nitroGLYCERIN 0.4 MG SL tablet  Commonly known as: NITROSTAT  Place 1 tablet (0.4 mg total) under the tongue every 5 (five) minutes as needed for Chest pain. Up to 3 doses. If chest pain is not relieved or worsens 3 to 5 minutes after 1 dose, call 911 and seek immediate emergency medical attention.     valsartan 40 MG tablet  Commonly known as: DIOVAN  Take 1 tablet (40 mg total) by mouth 2 (two) times daily.        CONTINUE taking these medications    atorvastatin 80 MG tablet  Commonly known as:  LIPITOR  Take 80 mg by mouth once daily.     clopidogreL 75 mg tablet  Commonly known as: PLAVIX  Take 75 mg by mouth.     gabapentin 400 MG capsule  Commonly known as: NEURONTIN  Take 400 mg by mouth 3 (three) times daily.     iron-vitamin C 100-250 mg (ICAR-C) 100-250 mg Tab  Take by mouth once daily.     metFORMIN 500 MG tablet  Commonly known as: GLUCOPHAGE  Take 1,000 mg by mouth 2 (two) times daily with meals.     mometasone-formoterol 100-5 mcg/actuation Hfaa  Commonly known as: DULERA  Inhale into the lungs.     oxyCODONE 15 MG Tab  Commonly known as: ROXICODONE  TAKE 1 TABLET BY MOUTH EVERY 8 TO 12 HOURS AS NEEDED FOR FOR PAIN     pantoprazole 40 MG tablet  Commonly known as: PROTONIX  Take 40 mg by mouth once daily.     solifenacin 5 MG tablet  Commonly known as: VESICARE  Take 5 mg by mouth.        STOP taking these medications    carvediloL 25 MG tablet  Commonly known as: COREG            Time spent on the discharge of patient: 20 minutes    Saadia Cheney MD  Cardiology  [facility] - Cardiology Stepdown

## 2023-08-01 NOTE — ASSESSMENT & PLAN NOTE
Mr. Barros is a 73 y/o M w/ PMHx CAD s/p prior CABG (vein graft to distal RCA), ICM, HFrEF (LVEF 20%), s/p ICD,  HTN, type 2 who was transferred from OSH for a viability study prior to CABG vs complex PCI consideration after he was found to have mvCAD. LHC done in 7.24.2 showed: Left main - 90%, Prox LAD-1 - 80%, mid lesion - 60%, Lcx - 90% w/ a widely patent RCA graft. TTE from 7/25 showed a severely reduced LVEF  of 25% and moderately to severely reduced right ventricular systolic function.He underwent a LHC yesterday that revealed 3 vessel disease including let main disease.  CTS is being consulted for possible surgical revascularization secondary to multivessel CAD.  CT chest reviewed as well as recent LHC,  Case discussed with Dr. Garvey who feels the patient has targets.  He will review and give final recommendations.

## 2023-08-01 NOTE — PLAN OF CARE
08/01/23 1427   Final Note   Assessment Type Final Discharge Note   Anticipated Discharge Disposition Home   What phone number can be called within the next 1-3 days to see how you are doing after discharge? [phone]   Hospital Resources/Appts/Education Provided Provided patient/caregiver with written discharge plan information;Appointments scheduled and added to AVS;Appointments scheduled by Navigator/Coordinator   Post-Acute Status   Discharge Delays None known at this time       Patient to discharge home /self care. Per physician discharge summary : Discharged Condition: stable     Disposition: Home or Self Care    Ludwin Adorno RN    [phone]    F/U with PCP scheduled per [facility].

## 2023-08-01 NOTE — SUBJECTIVE & OBJECTIVE
Interval History: VSS, NAEON. No CP, palpitations, or SOB. Planning staged PCI with LV assist.     Review of Systems   Constitutional: Negative for decreased appetite, weight gain and weight loss.   Cardiovascular:  Negative for chest pain, orthopnea, palpitations and paroxysmal nocturnal dyspnea.   Respiratory:  Negative for cough, snoring and wheezing.    Genitourinary:  Negative for dysuria, hesitancy and urgency.   Psychiatric/Behavioral:  Negative for altered mental status.      Objective:     Vital Signs (Most Recent):  Temp: 98.2 °F (36.8 °C) (08/01/23 0714)  Pulse: 70 (08/01/23 0714)  Resp: 20 (08/01/23 0714)  BP: 124/78 (08/01/23 0714)  SpO2: 96 % (08/01/23 0714) Vital Signs (24h Range):  Temp:  [96.5 °F (35.8 °C)-98.7 °F (37.1 °C)] 98.2 °F (36.8 °C)  Pulse:  [64-94] 70  Resp:  [16-20] 20  SpO2:  [91 %-97 %] 96 %  BP: (103-133)/(49-78) 124/78     Weight: 80.7 kg (177 lb 14.6 oz)  Body mass index is 28.72 kg/m².     SpO2: 96 %       No intake or output data in the 24 hours ending 08/01/23 0843    Lines/Drains/Airways       Peripheral Intravenous Line  Duration                  Peripheral IV - Single Lumen 07/28/23 0430 20 G Anterior;Right Forearm 4 days         Peripheral IV - Single Lumen 07/31/23 1325 18 G Right Antecubital <1 day                       Physical Exam  Constitutional:       Appearance: Normal appearance.   Cardiovascular:      Rate and Rhythm: Normal rate and regular rhythm.      Pulses: Normal pulses.      Heart sounds: Normal heart sounds.   Pulmonary:      Effort: Pulmonary effort is normal.      Breath sounds: Normal breath sounds.   Abdominal:      General: Abdomen is flat.      Palpations: Abdomen is soft.   Neurological:      General: No focal deficit present.      Mental Status: He is alert and oriented to person, place, and time.            Significant Labs: All pertinent lab results from the last 24 hours have been reviewed.    Significant Imaging:  All imaging within 24 hours  reviewed.

## 2023-08-01 NOTE — PLAN OF CARE
[facility] scheduled pcp f/u   Follow up with Kingsley Painting Jr, MD  Wednesday Aug 2, 2023  @10:30am [address] [place] [address]   [address]   [id number]  [phone]

## 2023-08-01 NOTE — SUBJECTIVE & OBJECTIVE
Current Facility-Administered Medications on File Prior to Encounter   Medication    diazePAM tablet 10 mg    diphenhydrAMINE capsule 50 mg     Current Outpatient Medications on File Prior to Encounter   Medication Sig    atorvastatin (LIPITOR) 80 MG tablet Take 80 mg by mouth once daily.    carvediloL (COREG) 25 MG tablet Take 25 mg by mouth 2 (two) times daily.    clopidogreL (PLAVIX) 75 mg tablet Take 75 mg by mouth.    gabapentin (NEURONTIN) 400 MG capsule Take 400 mg by mouth 3 (three) times daily.    metFORMIN (GLUCOPHAGE) 500 MG tablet Take 1,000 mg by mouth 2 (two) times daily with meals.    mometasone-formoterol (DULERA) 100-5 mcg/actuation HFAA Inhale into the lungs.    oxyCODONE (ROXICODONE) 15 MG Tab TAKE 1 TABLET BY MOUTH EVERY 8 TO 12 HOURS AS NEEDED FOR FOR PAIN    pantoprazole (PROTONIX) 40 MG tablet Take 40 mg by mouth once daily.    solifenacin (VESICARE) 5 MG tablet Take 5 mg by mouth.    iron-vitamin C 100-250 mg, ICAR-C, 100-250 mg Tab Take by mouth once daily.       Review of patient's allergies indicates:   Allergen Reactions    Ranolazine Shortness Of Breath     groggy         Past Medical History:   Diagnosis Date    Arthritis     Diabetes mellitus, type 2     Hypertension      Past Surgical History:   Procedure Laterality Date    CORONARY ANGIOGRAPHY Left 7/31/2023    Procedure: ANGIOGRAM, CORONARY ARTERY;  Surgeon: Alex Dietz MD;  Location: [facility] CATH LAB;  Service: Cardiology;  Laterality: Left;    IVUS, CORONARY  7/31/2023    Procedure: IVUS, Coronary;  Surgeon: Alex Dietz MD;  Location: [facility] CATH LAB;  Service: Cardiology;;    LEFT HEART CATHETERIZATION Left 7/24/2023    Procedure: Left heart cath;  Surgeon: Gloria Donald MD;  Location: [facility] CATH LAB;  Service: Cardiology;  Laterality: Left;  [facility]     Family History    None       Tobacco Use    Smoking status: Every Day     Current packs/day: 1.00     Types: Cigarettes    Smokeless tobacco: Never   Substance and Sexual  Activity    Alcohol use: Yes    Drug use: Never    Sexual activity: Not on file     Review of Systems   Constitutional:  Positive for activity change and fatigue. Negative for appetite change and fever.   HENT:  Negative for nosebleeds.    Respiratory:  Positive for shortness of breath. Negative for cough.    Cardiovascular:  Negative for chest pain, palpitations and leg swelling.   Gastrointestinal:  Negative for abdominal distention, abdominal pain and nausea.   Genitourinary:  Negative for frequency.   Musculoskeletal:  Negative for arthralgias and myalgias.   Skin:  Negative for rash.   Neurological:  Negative for dizziness and numbness.   Hematological:  Does not bruise/bleed easily.     Objective:     Vital Signs (Most Recent):  Temp: 96.2 °F (35.7 °C) (08/01/23 1119)  Pulse: 63 (08/01/23 1119)  Resp: 20 (08/01/23 1119)  BP: 128/71 (08/01/23 1119)  SpO2: (!) 92 % (08/01/23 1119) Vital Signs (24h Range):  Temp:  [96.2 °F (35.7 °C)-98.7 °F (37.1 °C)] 96.2 °F (35.7 °C)  Pulse:  [63-94] 63  Resp:  [18-20] 20  SpO2:  [92 %-97 %] 92 %  BP: (122-133)/(58-78) 128/71     Weight: 80.7 kg (177 lb 14.6 oz)  Body mass index is 28.72 kg/m².    SpO2: (!) 92 %        Intake/Output - Last 3 Shifts         07/30 0700 07/31 0659 07/31 0700 08/01 0659 08/01 0700 08/02 0659    P.O. 1069      Total Intake(mL/kg) 1069 (13.6)      Urine (mL/kg/hr) 400 (0.2)      Total Output 400      Net +669                      Lines/Drains/Airways       Peripheral Intravenous Line  Duration                  Peripheral IV - Single Lumen 07/28/23 0430 20 G Anterior;Right Forearm 4 days         Peripheral IV - Single Lumen 07/31/23 1325 18 G Right Antecubital <1 day                     Physical Exam  HENT:      Head: Normocephalic and atraumatic.   Eyes:      Extraocular Movements: Extraocular movements intact.   Cardiovascular:      Rate and Rhythm: Normal rate and regular rhythm.   Pulmonary:      Effort: Pulmonary effort is normal.   Abdominal:  "     General: Abdomen is flat.      Palpations: Abdomen is soft.   Musculoskeletal:         General: Normal range of motion.      Cervical back: Normal range of motion.   Skin:     General: Skin is warm and dry.      Capillary Refill: Capillary refill takes less than 2 seconds.      Comments: Previous sternotomy scar   Neurological:      General: No focal deficit present.            Significant Labs:  BMP:   Recent Labs   Lab 08/01/23  0301   GLU 89      K 4.4      CO2 21*   BUN 13   CREATININE 0.9   CALCIUM 8.6*   MG 1.3*     CBC: No results for input(s): "WBC", "RBC", "HGB", "HCT", "PLT", "MCV", "MCH", "MCHC" in the last 48 hours.  CMP:   Recent Labs   Lab 08/01/23  0301   GLU 89   CALCIUM 8.6*      K 4.4   CO2 21*      BUN 13   CREATININE 0.9     Coagulation:   Recent Labs   Lab 08/01/23  0555   INR 1.2       Significant Diagnostics: Reviewed  ECHO:  The left ventricle is severely enlarged with severely decreased systolic function.  The estimated ejection fraction is 25%.  Grade I left ventricular diastolic dysfunction.  Mild to moderate tricuspid regurgitation.  Mild pulmonic regurgitation.  Mild mitral regurgitation.  Moderate right ventricular enlargement with moderately to severely reduced right ventricular systolic function.  Mild left atrial enlargement.  Intermediate central venous pressure (8 mmHg).  The estimated PA systolic pressure is 24 mmHg.    PET Viability:    This is a resting perfusion study.    There is a very small sized, mild intensity, mid septal resting perfusion abnormality involving 2% of the LV myocardium in the distribution of the 1st septal territory.    The remaining walls outside of the defect are normal/viable.    Consider PET stress testing to determine ischemic burden depending on clinical conditions and judgement.    Gated perfusion images showed an ejection fraction of 31%. Normal ejection fraction is greater than 47%.    There are no prior studies for " comparison.    LHC:    The Ost LAD to Prox LAD lesion was 90% stenosed.    The Ost Cx to Prox Cx lesion was 90% stenosed.    The Ost LM to Mid LM lesion was 90% stenosed.    The estimated blood loss was none.    There was three vessel coronary artery disease. There was left main disease.    Surgical consult, discussed with Mare who will arrange..    CT Chest:  Impression:  No acute findings in the chest.  No significant interval change compared to August 2022.    Carotids:  The right internal carotid artery demonstrated less than 50% stenosis.  The left internal carotid artery demonstrated less than 50% stenosis.  Bilateral vertebral arteries were patent with antegrade flow.  I have reviewed and interpreted all pertinent imaging results/findings within the past 24 hours.

## 2023-08-01 NOTE — CONSULTS
[facility] - Cardiology Stepdown  Cardiothoracic Surgery  Consult Note    Patient Name: Jason Barros  MRN: [id number]  Admission Date: 7/28/2023  Attending Physician: Alex Dietz MD  Referring Provider: Brittany Galloway FNP    Patient information was obtained from patient, past medical records and ER records.     Inpatient consult to Cardiothoracic Surgery  Consult performed by: Alessandra Mchugh NP  Consult ordered by: Huma Young MD  Reason for consult: CABG Evaluation        Subjective:     Principal Problem: CAD, multiple vessel    History of Present Illness: Mr. Barros is a 73 y/o M w/ PMHx CAD s/p prior CABG (vein graft to distal RCA), ICM, HFrEF (LVEF 20%), s/p ICD,  HTN, type 2 who was transferred from [facility] for a viability study prior to CABG vs complex PCI consideration after he was found to have mvCAD. LHC done in 7.24.2 showed: Left main - 90%, Prox LAD-1 - 80%, mid lesion - 60%, Lcx - 90% w/ a widely patent RCA graft. TTE from 7/25 showed a severely reduced LVEF  of 25% and moderately to severely reduced right ventricular systolic function.     Mr. Barros admits stable angina with minimal walking. Denies SOB, edema, syncope, orthopnea or any other complaints. He has been loaded with plavix and ASA. Currently on plavix 75mg qd and asaa 81qd.     He underwent a LHC yesterday that revealed 3 vessel disease including let main disease.  CTS is being consulted for possible surgical revascularization secondary to multivessel CAD.      Current Facility-Administered Medications on File Prior to Encounter   Medication    diazePAM tablet 10 mg    diphenhydrAMINE capsule 50 mg     Current Outpatient Medications on File Prior to Encounter   Medication Sig    atorvastatin (LIPITOR) 80 MG tablet Take 80 mg by mouth once daily.    carvediloL (COREG) 25 MG tablet Take 25 mg by mouth 2 (two) times daily.    clopidogreL (PLAVIX) 75 mg tablet Take 75 mg by mouth.    gabapentin (NEURONTIN) 400 MG  capsule Take 400 mg by mouth 3 (three) times daily.    metFORMIN (GLUCOPHAGE) 500 MG tablet Take 1,000 mg by mouth 2 (two) times daily with meals.    mometasone-formoterol (DULERA) 100-5 mcg/actuation HFAA Inhale into the lungs.    oxyCODONE (ROXICODONE) 15 MG Tab TAKE 1 TABLET BY MOUTH EVERY 8 TO 12 HOURS AS NEEDED FOR FOR PAIN    pantoprazole (PROTONIX) 40 MG tablet Take 40 mg by mouth once daily.    solifenacin (VESICARE) 5 MG tablet Take 5 mg by mouth.    iron-vitamin C 100-250 mg, ICAR-C, 100-250 mg Tab Take by mouth once daily.       Review of patient's allergies indicates:   Allergen Reactions    Ranolazine Shortness Of Breath     groggy         Past Medical History:   Diagnosis Date    Arthritis     Diabetes mellitus, type 2     Hypertension      Past Surgical History:   Procedure Laterality Date    CORONARY ANGIOGRAPHY Left 7/31/2023    Procedure: ANGIOGRAM, CORONARY ARTERY;  Surgeon: Alex Dietz MD;  Location: [facility] CATH LAB;  Service: Cardiology;  Laterality: Left;    IVUS, CORONARY  7/31/2023    Procedure: IVUS, Coronary;  Surgeon: Alex Dietz MD;  Location: [facility] CATH LAB;  Service: Cardiology;;    LEFT HEART CATHETERIZATION Left 7/24/2023    Procedure: Left heart cath;  Surgeon: Gloria Donald MD;  Location: [facility] CATH LAB;  Service: Cardiology;  Laterality: Left;  [facility]     Family History    None       Tobacco Use    Smoking status: Every Day     Current packs/day: 1.00     Types: Cigarettes    Smokeless tobacco: Never   Substance and Sexual Activity    Alcohol use: Yes    Drug use: Never    Sexual activity: Not on file     Review of Systems   Constitutional:  Positive for activity change and fatigue. Negative for appetite change and fever.   HENT:  Negative for nosebleeds.    Respiratory:  Positive for shortness of breath. Negative for cough.    Cardiovascular:  Negative for chest pain, palpitations and leg swelling.   Gastrointestinal:  Negative for abdominal distention, abdominal pain and  nausea.   Genitourinary:  Negative for frequency.   Musculoskeletal:  Negative for arthralgias and myalgias.   Skin:  Negative for rash.   Neurological:  Negative for dizziness and numbness.   Hematological:  Does not bruise/bleed easily.     Objective:     Vital Signs (Most Recent):  Temp: 96.2 °F (35.7 °C) (08/01/23 1119)  Pulse: 63 (08/01/23 1119)  Resp: 20 (08/01/23 1119)  BP: 128/71 (08/01/23 1119)  SpO2: (!) 92 % (08/01/23 1119) Vital Signs (24h Range):  Temp:  [96.2 °F (35.7 °C)-98.7 °F (37.1 °C)] 96.2 °F (35.7 °C)  Pulse:  [63-94] 63  Resp:  [18-20] 20  SpO2:  [92 %-97 %] 92 %  BP: (122-133)/(58-78) 128/71     Weight: 80.7 kg (177 lb 14.6 oz)  Body mass index is 28.72 kg/m².    SpO2: (!) 92 %        Intake/Output - Last 3 Shifts         07/30 0700  07/31 0659 07/31 0700  08/01 0659 08/01 0700  08/02 0659    P.O. 1069      Total Intake(mL/kg) 1069 (13.6)      Urine (mL/kg/hr) 400 (0.2)      Total Output 400      Net +669                      Lines/Drains/Airways       Peripheral Intravenous Line  Duration                  Peripheral IV - Single Lumen 07/28/23 0430 20 G Anterior;Right Forearm 4 days         Peripheral IV - Single Lumen 07/31/23 1325 18 G Right Antecubital <1 day                     Physical Exam  HENT:      Head: Normocephalic and atraumatic.   Eyes:      Extraocular Movements: Extraocular movements intact.   Cardiovascular:      Rate and Rhythm: Normal rate and regular rhythm.   Pulmonary:      Effort: Pulmonary effort is normal.   Abdominal:      General: Abdomen is flat.      Palpations: Abdomen is soft.   Musculoskeletal:         General: Normal range of motion.      Cervical back: Normal range of motion.   Skin:     General: Skin is warm and dry.      Capillary Refill: Capillary refill takes less than 2 seconds.      Comments: Previous sternotomy scar   Neurological:      General: No focal deficit present.            Significant Labs:  BMP:   Recent Labs   Lab 08/01/23  0301   GLU 89   NA  "136   K 4.4      CO2 21*   BUN 13   CREATININE 0.9   CALCIUM 8.6*   MG 1.3*     CBC: No results for input(s): "WBC", "RBC", "HGB", "HCT", "PLT", "MCV", "MCH", "MCHC" in the last 48 hours.  CMP:   Recent Labs   Lab 08/01/23  0301   GLU 89   CALCIUM 8.6*      K 4.4   CO2 21*      BUN 13   CREATININE 0.9     Coagulation:   Recent Labs   Lab 08/01/23  0555   INR 1.2       Significant Diagnostics: Reviewed  ECHO:  The left ventricle is severely enlarged with severely decreased systolic function.  The estimated ejection fraction is 25%.  Grade I left ventricular diastolic dysfunction.  Mild to moderate tricuspid regurgitation.  Mild pulmonic regurgitation.  Mild mitral regurgitation.  Moderate right ventricular enlargement with moderately to severely reduced right ventricular systolic function.  Mild left atrial enlargement.  Intermediate central venous pressure (8 mmHg).  The estimated PA systolic pressure is 24 mmHg.    PET Viability:    This is a resting perfusion study.    There is a very small sized, mild intensity, mid septal resting perfusion abnormality involving 2% of the LV myocardium in the distribution of the 1st septal territory.    The remaining walls outside of the defect are normal/viable.    Consider PET stress testing to determine ischemic burden depending on clinical conditions and judgement.    Gated perfusion images showed an ejection fraction of 31%. Normal ejection fraction is greater than 47%.    There are no prior studies for comparison.    LHC:    The Ost LAD to Prox LAD lesion was 90% stenosed.    The Ost Cx to Prox Cx lesion was 90% stenosed.    The Ost LM to Mid LM lesion was 90% stenosed.    The estimated blood loss was none.    There was three vessel coronary artery disease. There was left main disease.    Surgical consult, discussed with Mare who will arrange..    CT Chest:  Impression:  No acute findings in the chest.  No significant interval change compared to August " 2022.    Carotids:  The right internal carotid artery demonstrated less than 50% stenosis.  The left internal carotid artery demonstrated less than 50% stenosis.  Bilateral vertebral arteries were patent with antegrade flow.  I have reviewed and interpreted all pertinent imaging results/findings within the past 24 hours.      Assessment/Plan:     NYHA Score: NYHA II: slight limitation of physical activity, comfortable at rest    * CAD, multiple vessel  Mr. Barros is a 71 y/o M w/ PMHx CAD s/p prior CABG (vein graft to distal RCA), ICM, HFrEF (LVEF 20%), s/p ICD,  HTN, type 2 who was transferred [facility] for a viability study prior to CABG vs complex PCI consideration after he was found to have mvCAD. LHC done in 7.24.2 showed: Left main - 90%, Prox LAD-1 - 80%, mid lesion - 60%, Lcx - 90% w/ a widely patent RCA graft. TTE from 7/25 showed a severely reduced LVEF  of 25% and moderately to severely reduced right ventricular systolic function.He underwent a LHC yesterday that revealed 3 vessel disease including let main disease.  CTS is being consulted for possible surgical revascularization secondary to multivessel CAD.  CT chest reviewed as well as recent LHC,  Case discussed with Dr. Garvey who feels the patient has targets.  He will review and give final recommendations.        Thank you for your consult. I will follow-up with patient. Please contact us if you have any additional questions.    Alessandra Mchugh NP  Cardiothoracic Surgery  [facility] - Cardiology Stepdown    CTS Attending Note:    I have personally taken the history and examined this patient and agree with the TIMO's note as stated above.  Very pleasant 72-year-old gentleman with previous single-vessel bypass and now with left main disease.  He is symptomatic.  His ejection fraction is markedly reduced but a PET demonstrated significant viability.  I spoke with the patient and his wife and recommended redo coronary artery bypass grafting.  He  agreed with this.  I will see him in clinic for a preoperative visit to review the plan in greater detail.

## 2023-08-01 NOTE — HPI
Mr. Barros is a 71 y/o M w/ PMHx CAD s/p prior CABG (vein graft to distal RCA), ICM, HFrEF (LVEF 20%), s/p ICD,  HTN, type 2 who was transferred from O[facility] for a viability study prior to CABG vs complex PCI consideration after he was found to have mvCAD. LHC done in 7.24.2 showed: Left main - 90%, Prox LAD-1 - 80%, mid lesion - 60%, Lcx - 90% w/ a widely patent RCA graft. TTE from 7/25 showed a severely reduced LVEF  of 25% and moderately to severely reduced right ventricular systolic function.     Mr. Barros admits stable angina with minimal walking. Denies SOB, edema, syncope, orthopnea or any other complaints. He has been loaded with plavix and ASA. Currently on plavix 75mg qd and asaa 81qd.     He underwent a LHC yesterday that revealed 3 vessel disease including let main disease.  CTS is being consulted for possible surgical revascularization secondary to multivessel CAD.

## 2023-08-01 NOTE — ASSESSMENT & PLAN NOTE
TTE from 7/25 showed a severely reduced LVEF  of 25% and moderately to severely reduced right ventricular systolic function. LVEF in 5/9/2023 was noted to be 40%.     Plan:   - optimize GDMT    - strict I&Os and daily weights  - euvolemic, lasix as needed

## 2023-08-03 NOTE — PROGRESS NOTES
C3 nurse spoke with Jason Barros  for a TCC post hospital discharge follow up call. The patient has a scheduled \[facility] [place] [facility]\"" appointment with Kingsley Painting Jr., MD  on 8/9/23 @ 1030.

## 2023-08-03 NOTE — PROGRESS NOTES
C3 nurse attempted to contact Jason Barros  for a TCC post hospital discharge follow up call. No answer. Left voicemail with callback information. The patient has a scheduled HOS appointment with Kingsley Painting Jr., MD  on 8/2/23 @ 1030.

## 2023-08-03 NOTE — PHYSICIAN QUERY
PT Name: Jason Barros  MR #: [id number]     DOCUMENTATION CLARIFICATION     CDS/: Parris Garcia RN               Contact information: [email or web address]  This form is a permanent document in the medical record.     Query Date: August 3, 2023    By submitting this query, we are merely seeking further clarification of documentation.  Please utilize your independent clinical judgment when addressing the question(s) below.    The Medical Record contains the following   Indicators Supporting Clinical Findings Location in Medical Record   x Heart Failure documented Acute on chronic combined systolic and diastolic heart failure  TTE from 7/25 showed a severely reduced LVEF  of 25% and moderately to severely reduced right ventricular systolic function.  LVEF in 5/9/2023 was noted to be 40%.   Plan:    -Continue coreg 12.5mg BID  - Holding rest of GDMT until final decision for cabg.  - strict I&Os and daily weights  - euvolemic, lasix as needed    Final Active Diagnoses:  Acute on chronic combined systolic and diastolic heart failure   H&P 7/28                    DCS 8/1    BNP       x EF/Echo Echo:  The left ventricle is severely enlarged with severely decreased systolic function.  The estimated ejection fraction is 25%.  Grade I left ventricular diastolic dysfunction.  Mild to moderate tricuspid regurgitation.  Mild pulmonic regurgitation.  Mild mitral regurgitation.  Moderate right ventricular enlargement with moderately to severely reduced right ventricular systolic function.  Mild left atrial enlargement.  Intermediate central venous pressure (8 mmHg).  The estimated PA systolic pressure is 24 mmHg.    Echo 7/25/23  (Previous encounter)   x Radiology findings CXR:   Impression:   No acute abnormality.    CXR 7/30   x Subjective/Objective Respiratory Conditions Denies SOB, GOMEZ, orthopnea, N/V, or any complaints now. H&P 7/28    Recent/Current MI      Heart Transplant, LVAD     x Edema, JVD Right lower  leg: No edema.     Left lower leg: No edema. Cards PN 7/29        Ascites      Diuretics/Meds      Other Treatment      Other       Heart failure is a clinical diagnosis which includes symptomatic fluid retention, elevated intracardiac pressures, and/or the inability of the heart to deliver adequate blood flow.    Heart Failure with reduced Ejection Fraction (HFrEF) or Systolic Heart Failure (loses ability to contract normally, EF is <40%)    Heart Failure with preserved Ejection Fraction (HFpEF) or Diastolic Heart Failure (stiff ventricles, does not relax properly, EF is >50%)     Heart Failure with Combined Systolic and Diastolic Failure (stiff ventricles, does not relax properly and EF is <50%)    Mid-range or mildly reduced ejection fraction (HFmrEF) is classified as systolic heart failure.  Congestive heart failure with a recovered EF is classified as Diastolic Heart Failure.  Common clues to acute exacerbation:  Rapidly progressive symptoms (w/in 2 weeks of presentation), using IV diuretics, using supplemental O2, pulmonary edema on Xray, new or worsening pleural effusion, +JVD or other signs of volume overload, MI w/in 4 weeks, and/or BNP >500  The clinical guidelines noted are only system guidelines, and do not replace the providers clinical judgment.    Due to the conflicting clinical picture, please clinically validate the diagnosis of __ Acute on chronic combined systolic and diastolic heart failure _______________.      If validated, please provide additional clinical support for the diagnosis.     [   ] Acute on chronic combined systolic and diastolic heart failure  is not confirmed and/or it has been ruled out     [   ] Acute on chronic combined systolic and diastolic heart failure is not confirmed and/or it has been ruled out, Chronic combined systolic and diastolic heart failure      [   ] Above stated diagnosis is confirmed. Please specify clinical support (signs, symptoms, and treatment) for  the  confirmed diagnosis: ____________________     [ x  ] Other clarification (please specify): _EF = 25 and grade 1 diastolic dysfunction. There is combined chronic systolic and diastolic heart failure. __________________         Please document in your progress notes daily for the duration of treatment until resolved and include in your discharge summary.    References:  American Heart Association editorial staff. (2017, May). Ejection Fraction Heart Failure Measurement. American Heart Association. https://www.heart.org/en/health-topics/heart-failure/diagnosing-heart-failure/ejection-fraction-heart-failure-measurement#:~:text=Ejection%20fraction%20(EF)%20is%20a,pushed%20out%20with%20each%20heartbeat  DANIEL Zamora (2020, December 15). Heart failure with preserved ejection fraction: Clinical manifestations and diagnosis. [address]. https://www.Mov[employer].u.sit/contents/heart-failure-with-preserved-ejection-fraction-clinical-manifestations-and-diagnosis.  ICD-10-CM/PCS Coding Clinic Third Quarter ICD-10, Effective with discharges: September 8, 2020 [facility] § Heart failure with mid-range or mildly reduced ejection fraction (2020).  ICD-10-CM/PCS Coding Clinic Third Quarter ICD-10, Effective with discharges: September 8, 2020 [facility] § Heart failure with recovered ejection fraction (2020).  Form No. [id number]

## 2023-08-10 NOTE — TELEPHONE ENCOUNTER
Spoke with pt and his wife to review pre-op testing appointments which have been scheduled for Tuesday, August 22. Also reviewed pt's medications. Pt will need to take last dose of fish oil on August 12, last dose of valsartan on August 19, and last dose of metformin the morning of August 22. Will send appointment information to pt via e-mail per his request. Pt and his wife verbalized understanding.

## 2023-08-17 NOTE — TELEPHONE ENCOUNTER
Notified pt that surgery will be moved from Wednesday, August 23 to Wednesday, August 30. Pre-op appointments will be rescheduled to Tuesday, August 29. Will follow up with pt tomorrow to discuss rescheduled appointments and medications. Pt agreeable and verbalized understanding of all information.

## 2023-08-18 NOTE — TELEPHONE ENCOUNTER
Called pt to review pre-op testing appointments which have been rescheduled to Tuesday, August 29. Will send new appointments and list of medications to hold prior to surgery to pt's wife's e-mail address per his request. Pt verbalized understanding of all information.

## 2023-08-20 NOTE — DISCHARGE SUMMARY
Procedure(s) (LRB):  Left heart cath (Left)    OUTCOME: Patient tolerated treatment/procedure well without complication and is now ready for discharge.    DIAGNOSIS: cad    DISPOSITION: Home or Self Care    FOLLOWUP: In clinic    DISCHARGE INSTRUCTIONS: No discharge procedures on file.    TIME SPENT ON DISCHARGE:   31 minutes

## 2023-08-25 NOTE — TELEPHONE ENCOUNTER
Returned call and spoke with pt's wife. Reminded pt's wife that tomorrow will be pt's last dose of valsartan in preparation for surgery. Pt's wife verbalized understanding.       ----- Message from Bay Byrne sent at 8/25/2023 10:24 AM CDT -----  Regarding: return call  Contact: @[phone]  Pt returning a call he missed no notes left in epic but when he called back it came through as general surg so he thinks it might be in regards to his  surg on 8/30 ...Pls call and adv@[phone]

## 2023-08-29 NOTE — H&P (VIEW-ONLY)
Subjective:      Patient ID: Jason Barros is a 72 y.o. male.    Chief Complaint: Pre-op Exam      HPI:  Jason Barros is a 72 y.o. male who presents for preop for CABG surgery. PMHx CAD s/p prior CABG (vein graft to distal RCA), ICM, HFrEF (LVEF 20%), s/p ICD,  HTN, type 2. He was seen has a consult on Aug 8. Transferred from OSH for a viability study prior to CABG vs complex PCI consideration after he was found to have mvCAD. LHC done in 7.24.2 showed: Left main - 90%, Prox LAD-1 - 80%, mid lesion - 60%, Lcx - 90% w/ a widely patent RCA graft. TTE from 7/25 showed a severely reduced LVEF  of 25% and moderately to severely reduced right ventricular systolic function.     Mr. Barros admits stable angina with minimal walking. Denies SOB, edema, syncope, orthopnea or any other complaints. He has been loaded with plavix and ASA. Currently on plavix 75mg qd and asaa 81qd.      He underwent a LHC  that revealed 3 vessel disease including let main disease.  CTS is being consulted for possible surgical revascularization secondary to multivessel CAD.    Current medications Reviewed  Current Outpatient Medications on File Prior to Visit   Medication Sig Dispense Refill    aspirin 81 MG Chew Chew and swallow 1 tablet (81 mg total) by mouth once daily. 30 tablet 11    atorvastatin (LIPITOR) 80 MG tablet Take 80 mg by mouth once daily.      clopidogreL (PLAVIX) 75 mg tablet Take 75 mg by mouth.      gabapentin (NEURONTIN) 400 MG capsule Take 400 mg by mouth 3 (three) times daily.      iron-vitamin C 100-250 mg, ICAR-C, 100-250 mg Tab Take by mouth once daily.      magnesium oxide (MAG-OX) 400 mg (241.3 mg magnesium) tablet Take 1 tablet (400 mg total) by mouth once daily. 30 tablet 0    metFORMIN (GLUCOPHAGE) 500 MG tablet Take 2 tablets (1,000 mg total) by mouth 2 (two) times daily with meals. Restart on 08/03/23      metoprolol succinate (TOPROL-XL) 25 MG 24 hr tablet Take 1 tablet (25 mg total) by mouth once  daily. 30 tablet 5    nitroGLYCERIN (NITROSTAT) 0.4 MG SL tablet Place 1 tablet (0.4 mg total) under the tongue every 5 (five) minutes as needed for Chest pain. Up to 3 doses. If chest pain is not relieved or worsens 3 to 5 minutes after 1 dose, call 911 and seek immediate emergency medical attention. 25 tablet 1    oxyCODONE (ROXICODONE) 15 MG Tab TAKE 1 TABLET BY MOUTH EVERY 8 TO 12 HOURS AS NEEDED FOR FOR PAIN      pantoprazole (PROTONIX) 40 MG tablet Take 40 mg by mouth once daily.      solifenacin (VESICARE) 5 MG tablet Take 5 mg by mouth.      valsartan (DIOVAN) 40 MG tablet Take 1 tablet (40 mg total) by mouth 2 (two) times daily. 60 tablet 5     Current Facility-Administered Medications on File Prior to Visit   Medication Dose Route Frequency Provider Last Rate Last Admin    diazePAM tablet 10 mg  10 mg Oral On Call Procedure Gloria Donald MD   10 mg at 07/24/23 1205    diphenhydrAMINE capsule 50 mg  50 mg Oral On Call Procedure Gloria Donald MD   50 mg at 07/24/23 1205       Review of Systems   Constitutional:  Negative for activity change, appetite change, fatigue and fever.   HENT:  Negative for nosebleeds.    Respiratory:  Negative for cough and shortness of breath.    Cardiovascular:  Negative for chest pain, palpitations and leg swelling.   Gastrointestinal:  Negative for abdominal distention, abdominal pain and nausea.   Genitourinary:  Negative for frequency.   Musculoskeletal:  Negative for arthralgias and myalgias.   Skin:  Negative for rash.   Neurological:  Negative for dizziness and numbness.   Hematological:  Does not bruise/bleed easily.     Objective:   Physical Exam  Constitutional:       Appearance: Normal appearance.   HENT:      Head: Normocephalic and atraumatic.   Eyes:      Extraocular Movements: Extraocular movements intact.   Cardiovascular:      Rate and Rhythm: Normal rate and regular rhythm.      Heart sounds: Normal heart sounds.   Pulmonary:      Effort: Pulmonary effort  is normal.      Breath sounds: Normal breath sounds.   Abdominal:      General: Abdomen is flat.      Palpations: Abdomen is soft.   Musculoskeletal:         General: Normal range of motion.      Cervical back: Normal range of motion.   Skin:     General: Skin is warm and dry.      Capillary Refill: Capillary refill takes less than 2 seconds.   Neurological:      General: No focal deficit present.      Mental Status: He is alert.         Diagnotic Results:reviewed  ECHO:  The left ventricle is severely enlarged with severely decreased systolic function.  The estimated ejection fraction is 25%.  Grade I left ventricular diastolic dysfunction.  Mild to moderate tricuspid regurgitation.  Mild pulmonic regurgitation.  Mild mitral regurgitation.  Moderate right ventricular enlargement with moderately to severely reduced right ventricular systolic function.  Mild left atrial enlargement.  Intermediate central venous pressure (8 mmHg).  The estimated PA systolic pressure is 24 mmHg.     PET Viability:    This is a resting perfusion study.    There is a very small sized, mild intensity, mid septal resting perfusion abnormality involving 2% of the LV myocardium in the distribution of the 1st septal territory.    The remaining walls outside of the defect are normal/viable.    Consider PET stress testing to determine ischemic burden depending on clinical conditions and judgement.    Gated perfusion images showed an ejection fraction of 31%. Normal ejection fraction is greater than 47%.    There are no prior studies for comparison.     LHC:    The Ost LAD to Prox LAD lesion was 90% stenosed.    The Ost Cx to Prox Cx lesion was 90% stenosed.    The Ost LM to Mid LM lesion was 90% stenosed.    The estimated blood loss was none.    There was three vessel coronary artery disease. There was left main disease.    Surgical consult, discussed with Mare who will arrange..     CT Chest:  Impression:  No acute findings in the chest.   No significant interval change compared to August 2022.     Carotids:  The right internal carotid artery demonstrated less than 50% stenosis.  The left internal carotid artery demonstrated less than 50% stenosis.  Bilateral vertebral arteries were patent with antegrade flow.  I have reviewed and interpreted all pertinent imaging results/findings within the past 24 hours.     Assessment:   Multivessel CAD   Plan:       CTS Attending Note:    I have personally taken the history and examined this patient and agree with the TIMO's note as stated above.  Very pleasant 72-year-old gentleman with known coronary artery disease.  He has had a previous single-vessel bypass with saphenous vein graft to the distal right coronary.  This graft is patent.  He now has high-grade distal left main disease.  He has a depressed ejection fraction but significant viability on PET scan.  I recommended redo sternotomy with two-vessel coronary artery bypass grafting, with left internal mammary to left anterior descending and saphenous vein graft to the obtuse marginal.  He agreed with this.  We discussed the risks and benefits of the proposed procedure, including but not limited to death, stroke, respiratory complications, renal complications, arrythmia, and infection. We discussed the STS predicted risk. His questions have been answered, and he wishes to proceed.  As part of our discussion we also reviewed the possibility that placement of an intra-aortic balloon pump would be indicated.  He agreed with this as well.

## 2023-08-29 NOTE — TELEPHONE ENCOUNTER
Informed pt of arrival time for surgery.  Pt instructed to report to [facility] at 6:00 tomorrow morning.  Pt reminded to perform shower with antibacterial soap tonight and tomorrow morning, and to become NPO at midnight.  Pt verbalized understanding.

## 2023-08-29 NOTE — PROGRESS NOTES
Patient has been identified as having an implanted cardiac rhythm device (CRD); the implanted device is a [employer] defibrillator.      Planned procedure: CABG x's 2, redo sternotomy.    Reprogramming of this device is required.     Please contact the Arrhythmia Department at Ext [phone].

## 2023-08-29 NOTE — ANESTHESIA PREPROCEDURE EVALUATION
[facility]  Anesthesia Pre-Operative Evaluation         Patient Name: Jason Barros  YOB: 1950  MRN: [id number]    SUBJECTIVE:     Pre-operative evaluation for Procedure(s) (LRB):  CABGx2, Redo Sternotomy (N/A)     08/29/2023    Jason Barros is a 72 y.o. male with a significant medical history of CAD s/p prior CABG (vein graft to distal RCA), ICM, HFrEF (LVEF 20%), s/p ICD,  HTN, type 2 DM who presents for the above procedure.    Lab Results   Component Value Date    WBC 6.95 08/29/2023    HGB 11.8 (L) 08/29/2023    HCT 39.1 (L) 08/29/2023    MCV 98 08/29/2023     08/29/2023       LDA: None documented.     Prev airway: None documented.    Results for orders placed during the hospital encounter of 07/24/23    Echo    Interpretation Summary  · Limited Echo to evaluate EF with definity.  · EF is approximately 20-25%  · Severe global hypokinesis    LHC:    The Ost LAD to Prox LAD lesion was 90% stenosed.    The Ost Cx to Prox Cx lesion was 90% stenosed.    The Ost LM to Mid LM lesion was 90% stenosed.    The estimated blood loss was none.    There was three vessel coronary artery disease. There was left main disease.    Surgical consult, discussed with Mare who will arrange..    Patient Active Problem List   Diagnosis    CAD, multiple vessel    Acute on chronic combined systolic and diastolic heart failure    Ischemic cardiomyopathy    Type 2 diabetes mellitus    Hyperlipidemia    Hypomagnesemia    ICD (implantable cardioverter-defibrillator) in place       Review of patient's allergies indicates:   Allergen Reactions    Ranolazine Shortness Of Breath     groggy         Current Inpatient Medications:      Current Facility-Administered Medications on File Prior to Encounter   Medication Dose Route Frequency Provider Last Rate Last Admin    diazePAM tablet 10 mg  10 mg Oral On Call Procedure Gloria Donald MD   10 mg at 07/24/23 1205    diphenhydrAMINE  capsule 50 mg  50 mg Oral On Call Procedure Gloria Donald MD   50 mg at 07/24/23 1205     Current Outpatient Medications on File Prior to Encounter   Medication Sig Dispense Refill    aspirin 81 MG Chew Chew and swallow 1 tablet (81 mg total) by mouth once daily. 30 tablet 11    atorvastatin (LIPITOR) 80 MG tablet Take 80 mg by mouth once daily.      clopidogreL (PLAVIX) 75 mg tablet Take 75 mg by mouth.      gabapentin (NEURONTIN) 400 MG capsule Take 400 mg by mouth 3 (three) times daily.      iron-vitamin C 100-250 mg, ICAR-C, 100-250 mg Tab Take by mouth once daily.      magnesium oxide (MAG-OX) 400 mg (241.3 mg magnesium) tablet Take 1 tablet (400 mg total) by mouth once daily. 30 tablet 0    metFORMIN (GLUCOPHAGE) 500 MG tablet Take 2 tablets (1,000 mg total) by mouth 2 (two) times daily with meals. Restart on 08/03/23      metoprolol succinate (TOPROL-XL) 25 MG 24 hr tablet Take 1 tablet (25 mg total) by mouth once daily. 30 tablet 5    nitroGLYCERIN (NITROSTAT) 0.4 MG SL tablet Place 1 tablet (0.4 mg total) under the tongue every 5 (five) minutes as needed for Chest pain. Up to 3 doses. If chest pain is not relieved or worsens 3 to 5 minutes after 1 dose, call 911 and seek immediate emergency medical attention. 25 tablet 1    oxyCODONE (ROXICODONE) 15 MG Tab TAKE 1 TABLET BY MOUTH EVERY 8 TO 12 HOURS AS NEEDED FOR FOR PAIN      pantoprazole (PROTONIX) 40 MG tablet Take 40 mg by mouth once daily.      solifenacin (VESICARE) 5 MG tablet Take 5 mg by mouth.      valsartan (DIOVAN) 40 MG tablet Take 1 tablet (40 mg total) by mouth 2 (two) times daily. 60 tablet 5       Past Surgical History:   Procedure Laterality Date    CORONARY ANGIOGRAPHY Left 7/31/2023    Procedure: ANGIOGRAM, CORONARY ARTERY;  Surgeon: Alex Dietz MD;  Location: [facility] CATH LAB;  Service: Cardiology;  Laterality: Left;    IVUS, CORONARY  7/31/2023    Procedure: IVUS, Coronary;  Surgeon: Alex Dietz MD;  Location:  [facility] CATH LAB;  Service: Cardiology;;    LEFT HEART CATHETERIZATION Left 7/24/2023    Procedure: Left heart cath;  Surgeon: Gloria Donald MD;  Location: [facility] CATH LAB;  Service: Cardiology;  Laterality: Left;  [facility]       Social History     Socioeconomic History    Marital status:    Tobacco Use    Smoking status: Every Day     Current packs/day: 1.00     Types: Cigarettes    Smokeless tobacco: Never   Substance and Sexual Activity    Alcohol use: Yes    Drug use: Never     Social Determinants of Health     Financial Resource Strain: Low Risk  (7/29/2023)    Overall Financial Resource Strain (CARDIA)     Difficulty of Paying Living Expenses: Not hard at all   Food Insecurity: No Food Insecurity (7/29/2023)    Hunger Vital Sign     Worried About Running Out of Food in the Last Year: Never true     Ran Out of Food in the Last Year: Never true   Transportation Needs: No Transportation Needs (7/29/2023)    PRAPARE - Transportation     Lack of Transportation (Medical): No     Lack of Transportation (Non-Medical): No   Physical Activity: Inactive (7/29/2023)    Exercise Vital Sign     Days of Exercise per Week: 0 days     Minutes of Exercise per Session: 0 min   Stress: No Stress Concern Present (7/29/2023)    [employer] - Occupational Stress Questionnaire     Feeling of Stress : Not at all   Social Connections: Socially Integrated (7/29/2023)    Social Connection and Isolation Panel [NHANES]     Frequency of Communication with Friends and Family: More than three times a week     Frequency of Social Gatherings with Friends and Family: More than three times a week     Attends [employer] Services: More than 4 times per year     Active Member of Clubs or Organizations: Yes     Attends Club or Organization Meetings: More than 4 times per year     Marital Status:    Housing Stability: Low Risk  (7/29/2023)    Housing Stability Vital Sign     Unable to Pay for  "Housing in the Last Year: No     Number of Places Lived in the Last Year: 1     Unstable Housing in the Last Year: No       OBJECTIVE:     Vital Signs Range:      7/24/2023     2:17 PM 7/28/2023     9:00 PM 8/29/2023    11:33 AM   Vitals - 1 value per visit   SYSTOLIC  135 104   DIASTOLIC  77 59   Pulse  83 62   Temp  36.8 °C (98.3 °F)    Resp 16 16    SPO2  95 % 95 %   Weight (lb)  174.6 189   Weight (kg)  79.2 85.73   Height  5' 5" (1.651 m) 5' 6" (1.676 m)   BMI (Calculated)  29.1 30.5   Pain Score   Zero         CBC:   Lab Results   Component Value Date    WBC 6.95 08/29/2023    HGB 11.8 (L) 08/29/2023    HCT 39.1 (L) 08/29/2023    MCV 98 08/29/2023     08/29/2023         CMP:   Sodium   Date Value Ref Range Status   08/29/2023 140 136 - 145 mmol/L Final     Potassium   Date Value Ref Range Status   08/29/2023 4.2 3.5 - 5.1 mmol/L Final     Chloride   Date Value Ref Range Status   08/29/2023 103 95 - 110 mmol/L Final     CO2   Date Value Ref Range Status   08/29/2023 26 23 - 29 mmol/L Final     Glucose   Date Value Ref Range Status   08/29/2023 96 70 - 110 mg/dL Final     BUN   Date Value Ref Range Status   08/29/2023 14 8 - 23 mg/dL Final     Creatinine   Date Value Ref Range Status   08/29/2023 0.9 0.5 - 1.4 mg/dL Final     Calcium   Date Value Ref Range Status   08/29/2023 9.3 8.7 - 10.5 mg/dL Final     Total Protein   Date Value Ref Range Status   08/29/2023 7.2 6.0 - 8.4 g/dL Final     Albumin   Date Value Ref Range Status   08/29/2023 3.6 3.5 - 5.2 g/dL Final     Total Bilirubin   Date Value Ref Range Status   08/29/2023 0.6 0.1 - 1.0 mg/dL Final     Comment:     For infants and newborns, interpretation of results should be based  on gestational age, weight and in agreement with clinical  observations.    Premature Infant recommended reference ranges:  Up to 24 hours.............<8.0 mg/dL  Up to 48 hours............<12.0 mg/dL  3-5 days..................<15.0 mg/dL  6-29 " days.................<15.0 mg/dL       Alkaline Phosphatase   Date Value Ref Range Status   08/29/2023 104 55 - 135 U/L Final     AST   Date Value Ref Range Status   08/29/2023 16 10 - 40 U/L Final     ALT   Date Value Ref Range Status   08/29/2023 14 10 - 44 U/L Final     Anion Gap   Date Value Ref Range Status   08/29/2023 11 8 - 16 mmol/L Final     Estimated GFR-Non    Date Value Ref Range Status   07/18/2022 >60 mls/min/1.73/m2 Final       INR:  Lab Results   Component Value Date    INR 1.3 (H) 08/29/2023    INR 1.2 08/01/2023    INR 1.1 07/31/2023    PROTIME 16.1 (H) 07/25/2023    PROTIME 16.1 (H) 08/02/2019    PROTIME 16.0 (H) 08/01/2019       EKG:   Results for orders placed or performed during the hospital encounter of 08/29/23   EKG 12-lead    Collection Time: 08/29/23  9:48 AM    Narrative    Test Reason : I25.10,    Vent. Rate : 063 BPM     Atrial Rate : 063 BPM     P-R Int : 148 ms          QRS Dur : 156 ms      QT Int : 502 ms       P-R-T Axes : 042 -52 118 degrees     QTc Int : 513 ms    Normal sinus rhythm  Left axis deviation  Left bundle branch block  Abnormal ECG  When compared with ECG of 29-JUL-2023 00:29,  No significant change was found  Confirmed by Hector Lowe MD ([id number]) on 8/29/2023 3:11:13 PM    Referred By: YULIA SCOTT           Confirmed By:Hector Lowe MD        2D ECHO:   Results for orders placed during the hospital encounter of 07/24/23    Echo    Interpretation Summary  · Limited Echo to evaluate EF with definity.  · EF is approximately 20-25%  · Severe global hypokinesis         ASSESSMENT/PLAN:         Pre-op Assessment    I have reviewed the Patient Summary Reports.       I have reviewed the Medications.     Review of Systems  Anesthesia Hx:  No problems with previous Anesthesia  Denies Family Hx of Anesthesia complications.   Denies Personal Hx of Anesthesia complications.   Hematology/Oncology:     Oncology Normal     Cardiovascular:   Hypertension CAD   CABG/stent  CHF hyperlipidemia    Pulmonary:  Pulmonary Normal    Renal/:  Renal/ Normal     Hepatic/GI:  Hepatic/GI Normal    Endocrine:   Diabetes, type 2        Physical Exam  General: Well nourished, Cooperative, Alert and Oriented    Airway:  Mallampati: III   Mouth Opening: Normal  TM Distance: Normal  Tongue: Normal  Neck ROM: Normal ROM    Dental:  Periodontal disease        Anesthesia Plan  Type of Anesthesia, risks & benefits discussed:    Anesthesia Type: Gen ETT  Intra-op Monitoring Plan: Standard ASA Monitors, Art Line, Central Line, LAUREN, PA and CO  Post Op Pain Control Plan: multimodal analgesia and IV/PO Opioids PRN  Induction:  IV  Airway Plan: Direct and Video, Post-Induction  Informed Consent: Informed consent signed with the Patient and all parties understand the risks and agree with anesthesia plan.  All questions answered. Patient consented to blood products? Yes  ASA Score: 4  Day of Surgery Review of History & Physical: H&P Update referred to the surgeon/provider.I have interviewed and examined the patient. I have reviewed the patient's H&P dated: There are no significant changes.   Anesthesia Plan Notes: Discussed plan for awake arterial line placement, intraoperative LAUREN, central line insertion, and post operative ICU care    Ready For Surgery From Anesthesia Perspective.     .

## 2023-08-29 NOTE — PATIENT INSTRUCTIONS
"Pt verbally instructed and written instructions given for surgery.      PREPARING FOR SURGERY    Your surgery has been scheduled for:    Day: Wednesday   Date:  August 30    Arrival Time: 0600    Start Time: 0800    You should report to the second floor surgery center, located on the [facility] side of the second floor of the [facility]. The phone number is [phone].         PLEASE NOTE    If you are allergic to any medications, please inform your doctor or the nurse responsible for your care.  Tell the doctor if you take aspirin, products containing aspirin, herbal medications or blood thinners, such as Coumadin, Pradaxa, or Plavix.  Notify your doctor if you are diabetic and provide information about the medications you take.  Arrange for someone to drive you home following surgery. You will not be allowed to leave the surgical facility alone or drive yourself home following sedation and anesthesia.  If you have not already done so, please bring a list of your medications with you the day of your surgery.          THE NIGHT BEFORE SURGERY    Eat a light supper on the night before your surgery, no greasy or fatty foods.    DO NOT EAT OR DRINK ANYTHING AFTER MIDNIGHT, INCLUDING GUM, HARD CANDY, MINTS, OR CHEWING TOBACCO    Take a complete shower. Wash your body from the neck down with Hibiclens (chlorhexidine gluconate) soap. Hibiclens soap may be purchased over the counter at the pharmacy. Keep the soap away from your eyes, ears, and mouth. After washing with Hibiclens, rinse thoroughly. You may also use any soap labeled "antibacterial". Shampoo your hair with your regular shampoo.         THE DAY OF SURGERY    Take another shower with Hibiclens or any antibacterial soap, to reduce the change of infection.    Medications to take the morning of surgery: metoprolol with a small sip of water.     Diabetic medication instructions: hold metformin night before and morning of surgery    You may brush " your teeth and rinse your mouth, but do not shallow any water.    Do not apply perfume, powder, body lotions or deodorant on the day of surgery.    Do not wear any makeup, including mascara and false eyelashes.    Nail polish should be removed.    Wear comfortable clothes, such as button front shirt and loose-fitting pants.    Leave all jewelry, including body piercings and valuables at home.    Hairpins and clasps must be removed before you enter the operating room.    You may wear glasses, dentures and hearing aids before and after surgery. They may need to be removed before going into the operating room. Contact lenses worn before surgery must be removed before entering the operating room. Please bring a case for your hearing aids, glasses and/or contacts.    Bring any devices you will need after surgery such as crutches or canes.    If you have sleep apnea, please bring your CPAP machine.    If you have an implantable device, such as a pacemaker or AICD, please bring the device information card, if you have one.       If you have any questions or concerns, please don't hesitate to call.

## 2023-08-30 NOTE — HPI
Jason Barros is a 71yo M with a PMHx CAD s/p prior CABG (vein graft to distal RCA), ICM, HFrEF (LVEF 20%), s/p ICD,  HTN, DM type 2. LHC done showed three vessel disease: left main - 90%, Prox LAD-1 - 80%, mid lesion - 60%, Lcx - 90% w/ a widely patent RCA graft. TTE from 7/25 showed a severely reduced LVEF  of 25% and moderately to severely reduced right ventricular systolic function. He has been loaded with plavix and ASA. Currently on plavix 75mg qd and Asa 81.    The patient presents to the SICU s/p CABGx2 with Dr. Garvey on 08/30/2023. On admission, they are intubated, sedated with propofol, and in stable condition. Inotropic and vasopressor requirements upon admission are .02 mcg/kg/min of epinephrineand .375mcg/kg/min of milrinone to maintain blood pressure at a MAP 60-80 and SBP < 140. Central access includes a RIJ CVC, arterial access includes a right radial arterial line. They also have 2 pleural and 2 mediastinal chest tubes with appropriate output.    Intraoperatively, they received 1.5L of crystalloid,  of cell saver. Urine output intraoperatively was 1.8Lcc. The pre-operative echocardiogram was notable for EF 25% with moderate RV dysfunction. Post-operative echo was with EF 20-25%, moderately reduced RV function on 0.04 epi and 0.375 milrinone.    Immediate post-operative plans include hemodynamic stabilization and weaning of cardiac and respiratory support. Plan to wean vasopressors and inotropes as tolerated, wean ventilator support with goal of early extubation, and closely monitor fluid status with strict Is/Os and continued fluid resuscitation as needed.

## 2023-08-30 NOTE — ANESTHESIA PROCEDURE NOTES
Rectus Sheath Block    Patient location during procedure: pre-op   Block not for primary anesthetic.  Reason for block: at surgeon's request and post-op pain management   Post-op Pain Location: abdominal pain   Start time: 8/30/2023 9:01 AM  Timeout: 8/30/2023 9:00 AM   End time: 8/30/2023 9:10 AM    Staffing  Authorizing Provider: Flo Conley MD  Performing Provider: Kuldeep Armendariz MD    Staffing  Performed by: Kuldeep Armendariz MD  Authorized by: Flo Conley MD    Preanesthetic Checklist  Completed: patient identified, IV checked, site marked, risks and benefits discussed, surgical consent, monitors and equipment checked, pre-op evaluation and timeout performed  Peripheral Block  Patient position: supine  Prep: ChloraPrep  Patient monitoring: heart rate, cardiac monitor, continuous pulse ox, continuous capnometry and frequent blood pressure checks  Block type: rectus sheath  Laterality: bilateral  Injection technique: single shot  Needle  Needle type: Stimuplex   Needle gauge: 22 G  Needle length: 2 in  Needle localization: anatomical landmarks and ultrasound guidance   -ultrasound image captured on disc.  Assessment  Injection assessment: negative aspiration, negative parasthesia and local visualized surrounding nerve  Paresthesia pain: none  Heart rate change: no  Slow fractionated injection: yes        Additional Notes  VSS.  Vitals monitored throughout procedure - see record.  Patient tolerated procedure well.  0.375% Bupivacaine, 1:200,000 EPI 10mls bilaterally

## 2023-08-30 NOTE — TRANSFER OF CARE
"Anesthesia Transfer of Care Note    Patient: Jason Barros    Procedure(s) Performed: Procedure(s) (LRB):  CABG, REPEAT Redo Sternotomy (N/A)  SURGICAL PROCUREMENT, VEIN, ENDOSCOPIC (Left)    Patient location: ICU    Anesthesia Type: general    Transport from OR: Transported from OR intubated on 100% O2  with adequate ventilation controlled by transport ventilator    Post pain: adequate analgesia    Post assessment: no apparent anesthetic complications    Post vital signs: stable    Level of consciousness: sedated    Nausea/Vomiting: no nausea/vomiting    Complications: none    Transfer of care protocol was followed      Last vitals:   Visit Vitals  /72 (BP Location: Right arm)   Pulse 66   Temp 36.6 °C (97.9 °F) (Oral)   Resp 20   Ht 5' 6" (1.676 m)   Wt 85.7 kg (188 lb 15 oz)   SpO2 100%   BMI 30.49 kg/m²     "

## 2023-08-30 NOTE — SUBJECTIVE & OBJECTIVE
Follow-up For: Procedure(s) (LRB):  CABGx2, Redo Sternotomy (N/A)    Post-Operative Day: Day of Surgery     Past Medical History:   Diagnosis Date    Arthritis     Diabetes mellitus, type 2     Hypertension        Past Surgical History:   Procedure Laterality Date    CORONARY ANGIOGRAPHY Left 7/31/2023    Procedure: ANGIOGRAM, CORONARY ARTERY;  Surgeon: Alex Dietz MD;  Location: [facility] CATH LAB;  Service: Cardiology;  Laterality: Left;    IVUS, CORONARY  7/31/2023    Procedure: IVUS, Coronary;  Surgeon: Alex Dietz MD;  Location: [facility] CATH LAB;  Service: Cardiology;;    LEFT HEART CATHETERIZATION Left 7/24/2023    Procedure: Left heart cath;  Surgeon: Gloria Donald MD;  Location: [facility] CATH LAB;  Service: Cardiology;  Laterality: Left;  [facility]       Review of patient's allergies indicates:   Allergen Reactions    Ranolazine Shortness Of Breath     groggy         Family History    None       Tobacco Use    Smoking status: Every Day     Current packs/day: 1.00     Types: Cigarettes    Smokeless tobacco: Never   Substance and Sexual Activity    Alcohol use: Yes    Drug use: Never    Sexual activity: Not on file      Review of Systems   Unable to perform ROS: Intubated     Objective:     Vital Signs (Most Recent):  Temp: 97.9 °F (36.6 °C) (08/30/23 0623)  Pulse: 66 (08/30/23 0623)  Resp: 20 (08/30/23 0623)  BP: 111/72 (08/30/23 0623)  SpO2: 96 % (08/30/23 0623) Vital Signs (24h Range):  Temp:  [97.9 °F (36.6 °C)] 97.9 °F (36.6 °C)  Pulse:  [62-66] 66  Resp:  [20] 20  SpO2:  [95 %-96 %] 96 %  BP: (104-111)/(59-72) 111/72     Weight: 85.7 kg (188 lb 15 oz)  Body mass index is 30.49 kg/m².    No intake or output data in the 24 hours ending 08/30/23 0830       Physical Exam  Vitals reviewed.   Constitutional:       General: He is not in acute distress.     Interventions: He is sedated and intubated.   Eyes:      Extraocular Movements: Extraocular movements intact.      Pupils: Pupils are equal, round, and reactive  to light.   Neck:      Comments: [facility] CVC  Cardiovascular:      Rate and Rhythm: Normal rate and regular rhythm.      Pulses: Normal pulses.      Comments: AICD     Pulmonary:      Effort: No respiratory distress. He is intubated.   Abdominal:      General: There is no distension.      Palpations: Abdomen is soft.   Musculoskeletal:      Right lower leg: No edema.      Left lower leg: No edema.   Skin:     General: Skin is warm and dry.      Capillary Refill: Capillary refill takes less than 2 seconds.      Coloration: Skin is not jaundiced.      Comments: MSI clean dry and intact  Chest tubes in place    Neurological:      General: No focal deficit present.            Vents:       Lines/Drains/Airways       Peripheral Intravenous Line  Duration                  Peripheral IV - Single Lumen 08/30/23 0723 20 G Posterior;Right Hand <1 day                    Significant Labs:    CBC/Anemia Profile:  Recent Labs   Lab 08/29/23  0920   WBC 6.95   HGB 11.8*   HCT 39.1*      MCV 98   RDW 17.2*        Chemistries:  Recent Labs   Lab 08/29/23  0920      K 4.2      CO2 26   BUN 14   CREATININE 0.9   CALCIUM 9.3   ALBUMIN 3.6   PROT 7.2   BILITOT 0.6   ALKPHOS 104   ALT 14   AST 16       All pertinent labs within the past 24 hours have been reviewed.    Significant Imaging: I have reviewed all pertinent imaging results/findings within the past 24 hours.

## 2023-08-30 NOTE — ANESTHESIA PROCEDURE NOTES
Brachial Arterial Line    Diagnosis: Coronary Artery Disease  Doctor requesting consult: Dr. Garvey    Patient location during procedure: done in OR  Procedure start time: 8/30/2023 8:16 AM  Timeout: 8/30/2023 8:15 AM  Procedure end time: 8/30/2023 8:20 AM    Staffing  Authorizing Provider: Flo Conley MD  Performing Provider: Kuldeep Armendariz MD    Staffing  Performed by: Kuldeep Armendariz MD  Authorized by: Flo Conley MD      Preanesthetic Checklist  Completed: patient identified, IV checked, site marked, risks and benefits discussed, surgical consent, monitors and equipment checked, pre-op evaluation, timeout performed and anesthesia consent givenBrachial Arterial Line  Skin Prep: chlorhexidine gluconate and isopropyl alcohol  Local Infiltration: lidocaine  Orientation: right  Location: brachial    Catheter Size: 20 G  Catheter placement by Ultrasound guidance. Heme positive aspiration all ports.   Vessel Caliber: large, patent, compressibility normal  Needle advanced into vessel with real time Ultrasound guidance.  Guidewire confirmed in vessel.  Sterile sheath used.Insertion Attempts: 1  Assessment  Dressing: secured with tape and tegaderm  Patient: Tolerated well

## 2023-08-30 NOTE — PROGRESS NOTES
Pt admitted to SICU [id number] s/p CABG via anesthesia team, attached to bedside monitor and ventilator. All VSS upon arrival. CTS resident, RRT, and charge RN at bedside. New orders received and implemented. Full assessment completed of patient at this time. Midsternal and L leg incision dressings intact. Chest tubes with minimal sanguinous output, marked upon arrival. Immediate needs of patient identified. No other significant events upon admit.

## 2023-08-30 NOTE — PROGRESS NOTES
Arrhythmia Department  [facility]    Received call from [facility] that patient arrived for his CABG procedure and has a defibrillator requiring re-programming.     Underlying rhythm: SR w/ 2.6% A pacing and <1% V pacing  Reprogramming done: tachy therapies disabled    Notified bedside RN tachy therapies disabled.   For post op reprogramming, please call ext [phone]      IF PATIENT RETURNS FROM PROCEDURE AFTER 5 PM,  please call Cardiology on call fellow for post op reprogramming.

## 2023-08-30 NOTE — RESPIRATORY THERAPY
Received patient from the OR. Placed on ventilator with documented settings. ABG done. Will continue to monitor.

## 2023-08-30 NOTE — ASSESSMENT & PLAN NOTE
A1c 5.5. Home medications metformin. Will hold while inpatient. Endocrinology consulted for insulin management.     -- BG goal 110-140  -- hypoglycemia protocol

## 2023-08-30 NOTE — PLAN OF CARE
Chart reviewed. Preop nursing care completed per orders. Safe surgery checklist complete. Family at bedside and to take belongings. Waiting for update H&P and anesthesia consent prior to surgery. Call bell within reach. Instructed pt to call for assistance.

## 2023-08-30 NOTE — H&P
[facility] - Surgical Intensive Care  Critical Care - Surgery  History & Physical    Patient Name: Jason Barros  MRN: [id number]  Admission Date: 8/30/2023  Code Status: Full Code  Attending Physician: Lazaro Garvey MD   Primary Care Provider: Kingsley Painting Jr., MD   Principal Problem: CAD, multiple vessel    Subjective:     HPI:  Jason Barros is a 73yo M with a PMHx CAD s/p prior CABG (vein graft to distal RCA), ICM, HFrEF (LVEF 20%), s/p ICD,  HTN, DM type 2. LHC done showed three vessel disease: left main - 90%, Prox LAD-1 - 80%, mid lesion - 60%, Lcx - 90% w/ a widely patent RCA graft. TTE from 7/25 showed a severely reduced LVEF  of 25% and moderately to severely reduced right ventricular systolic function. He has been loaded with plavix and ASA. Currently on plavix 75mg qd and Asa 81.    The patient presents to the SICU s/p CABGx2 with Dr. Garvey on 08/30/2023. On admission, they are intubated, sedated with propofol, and in stable condition. Inotropic and vasopressor requirements upon admission are .02 mcg/kg/min of epinephrineand .375mcg/kg/min of milrinone to maintain blood pressure at a MAP 60-80 and SBP < 140. Central access includes a RIJ CVC, arterial access includes a right radial arterial line. They also have 2 pleural and 2 mediastinal chest tubes with appropriate output.    Intraoperatively, they received 1.5L of crystalloid,  of cell saver. Urine output intraoperatively was 1.8Lcc. The pre-operative echocardiogram was notable for EF 25% with moderate RV dysfunction. Post-operative echo was with EF 20-25%, moderately reduced RV function on 0.04 epi and 0.375 milrinone.    Immediate post-operative plans include hemodynamic stabilization and weaning of cardiac and respiratory support. Plan to wean vasopressors and inotropes as tolerated, wean ventilator support with goal of early extubation, and closely monitor fluid status with strict Is/Os and continued fluid resuscitation as  needed.       Hospital/ICU Course:  No notes on file    Follow-up For: Procedure(s) (LRB):  CABGx2, Redo Sternotomy (N/A)    Post-Operative Day: Day of Surgery     Past Medical History:   Diagnosis Date    Arthritis     Diabetes mellitus, type 2     Hypertension        Past Surgical History:   Procedure Laterality Date    CORONARY ANGIOGRAPHY Left 7/31/2023    Procedure: ANGIOGRAM, CORONARY ARTERY;  Surgeon: Alex Dietz MD;  Location: [facility] CATH LAB;  Service: Cardiology;  Laterality: Left;    IVUS, CORONARY  7/31/2023    Procedure: IVUS, Coronary;  Surgeon: Alex Dietz MD;  Location: [facility] CATH LAB;  Service: Cardiology;;    LEFT HEART CATHETERIZATION Left 7/24/2023    Procedure: Left heart cath;  Surgeon: Gloria Donald MD;  Location: [facility] CATH LAB;  Service: Cardiology;  Laterality: Left;  [facility]       Review of patient's allergies indicates:   Allergen Reactions    Ranolazine Shortness Of Breath     groggy         Family History    None       Tobacco Use    Smoking status: Every Day     Current packs/day: 1.00     Types: Cigarettes    Smokeless tobacco: Never   Substance and Sexual Activity    Alcohol use: Yes    Drug use: Never    Sexual activity: Not on file      Review of Systems   Unable to perform ROS: Intubated     Objective:     Vital Signs (Most Recent):  Temp: 97.9 °F (36.6 °C) (08/30/23 0623)  Pulse: 66 (08/30/23 0623)  Resp: 20 (08/30/23 0623)  BP: 111/72 (08/30/23 0623)  SpO2: 96 % (08/30/23 0623) Vital Signs (24h Range):  Temp:  [97.9 °F (36.6 °C)] 97.9 °F (36.6 °C)  Pulse:  [62-66] 66  Resp:  [20] 20  SpO2:  [95 %-96 %] 96 %  BP: (104-111)/(59-72) 111/72     Weight: 85.7 kg (188 lb 15 oz)  Body mass index is 30.49 kg/m².    No intake or output data in the 24 hours ending 08/30/23 0830       Physical Exam  Vitals reviewed.   Constitutional:       General: He is not in acute distress.     Interventions: He is sedated and intubated.   Eyes:      Extraocular Movements: Extraocular  movements intact.      Pupils: Pupils are equal, round, and reactive to light.   Neck:      Comments: [facility] CVC  Cardiovascular:      Rate and Rhythm: Normal rate and regular rhythm.      Pulses: Normal pulses.      Comments: AICD     Pulmonary:      Effort: No respiratory distress. He is intubated.   Abdominal:      General: There is no distension.      Palpations: Abdomen is soft.   Musculoskeletal:      Right lower leg: No edema.      Left lower leg: No edema.   Skin:     General: Skin is warm and dry.      Capillary Refill: Capillary refill takes less than 2 seconds.      Coloration: Skin is not jaundiced.      Comments: MSI clean dry and intact  Chest tubes in place    Neurological:      General: No focal deficit present.            Vents:       Lines/Drains/Airways       Peripheral Intravenous Line  Duration                  Peripheral IV - Single Lumen 08/30/23 0723 20 G Posterior;Right Hand <1 day                    Significant Labs:    CBC/Anemia Profile:  Recent Labs   Lab 08/29/23  0920   WBC 6.95   HGB 11.8*   HCT 39.1*      MCV 98   RDW 17.2*        Chemistries:  Recent Labs   Lab 08/29/23  0920      K 4.2      CO2 26   BUN 14   CREATININE 0.9   CALCIUM 9.3   ALBUMIN 3.6   PROT 7.2   BILITOT 0.6   ALKPHOS 104   ALT 14   AST 16       All pertinent labs within the past 24 hours have been reviewed.    Significant Imaging: I have reviewed all pertinent imaging results/findings within the past 24 hours.    Assessment/Plan:     Cardiac/Vascular  * CAD, multiple vessel    Neuro/Psych:     - Sedation: propofol    - Pain:    - Scheduled Tylenol 1g q8h   - Fentanyl PRN while intubated, Oxy PRN             Cardiac:     - S/P CABGx with Dr. Garvey on 8/30/2023    - BP Goal: MAP 60-80, SBP < 140    - Cleviprex PRN    - Pressors:    - Anti-HTNs: Will start when appropriate    - Rhythm: NSR    - Beta blocker: Will start when appropriate    - Statin: Atorvastatin 40 mg QD    - AICD       Pulmonary:      - Goal SpO2 >92%    - Will wean ventilator support as tolerated to extubate    - Chest Tubes x 4 (2 Meds & 2 Pleural)    - ABGs PRN      Renal:    - Trend BUN/Cr     - Maintain Saenz, record strict Is/Os    Recent Labs   Lab 08/29/23  0920   BUN 14   CREATININE 0.9         FEN / GI:     - Daily CMP, PRN K/Mag/Phos per protocol     - Replace electrolytes as needed    - Nutrition: NPO pending extubation    - Bowel Regimen: Miralax, docusate      ID:     - Afebrile    - WBC stable    - Abx: Complete perioperative cefazolin 2g Q8H x 5 doses    Recent Labs   Lab 08/29/23  0920   WBC 6.95         Heme/Onc:     - Hgb 11.8 pre-operatively    - CBC daily    - ASA 325mg tonight, DAPT tomorrow     Recent Labs   Lab 08/29/23  0920   HGB 11.8*      APTT 25.9   INR 1.3*         Endocrine:     - CTS Goal -140    - HgbA1c: 5.5    - Endocrinology consulted for insulin management      PPx:   Feeding: npo  Analgesia/Sedation: multimodal  Thromboembolic Prevention: SCDs  HOB >30: Yes  Stress Ulcer: pepcid  Glucose Control: Yes, insulin management per Endocrinology     Lines/Drains/Airway:   ETT   Right radial arterial line   RIJ CVC   Saenz   Chest Tubes:4   Pacing Wires: Temporary ventricular pacing wires      Dispo/Code Status/Palliative:     - Continue SICU Care    - Full Code      ICD (implantable cardioverter-defibrillator) in place  Consult EP to program back to original setting and initiate tachytherapies     Hyperlipidemia  Continue high intensity statin     Acute on chronic combined systolic and diastolic heart failure  TTE as of 7/2023 with EF 25%, RV dysfunction.   GDMT: toprol and valsartan, will hold in immediate post-operative setting and restart when clinically appropriate     Endocrine  Type 2 diabetes mellitus  A1c 5.5. Home medications metformin. Will hold while inpatient. Endocrinology consulted for insulin management.     -- BG goal 110-140  -- hypoglycemia protocol            Critical care  was time spent personally by me on the following activities: development of treatment plan with patient or surrogate and bedside caregivers, discussions with consultants, evaluation of patient's response to treatment, examination of patient, ordering and performing treatments and interventions, ordering and review of laboratory studies, ordering and review of radiographic studies, pulse oximetry, re-evaluation of patient's condition.  This critical care time did not overlap with that of any other provider or involve time for any procedures.     Maria Esther Sotomayor NP  Critical Care - Surgery  [facility] - Surgical Intensive Care

## 2023-08-30 NOTE — ANESTHESIA PROCEDURE NOTES
TTP Block    Patient location during procedure: OR   Block not for primary anesthetic.  Reason for block: at surgeon's request and post-op pain management   Post-op Pain Location: Sternal pain   Start time: 8/30/2023 8:51 AM  Timeout: 8/30/2023 8:50 AM   End time: 8/30/2023 9:00 AM    Staffing  Authorizing Provider: Flo Conley MD  Performing Provider: Kuldeep Armendariz MD    Staffing  Performed by: Kuldeep Armendariz MD  Authorized by: Flo Conley MD    Preanesthetic Checklist  Completed: patient identified, IV checked, site marked, risks and benefits discussed, surgical consent, monitors and equipment checked, pre-op evaluation and timeout performed  Peripheral Block  Patient position: supine  Prep: ChloraPrep  Patient monitoring: heart rate, cardiac monitor, continuous pulse ox, continuous capnometry and frequent blood pressure checks  Block type: Transversus thoracis  Laterality: bilateral  Injection technique: single shot  Needle  Needle type: Stimuplex   Needle gauge: 22 G  Needle length: 2 in  Needle localization: ultrasound guidance and anatomical landmarks  Needle insertion depth: 1 cm  Catheter type: non-stimulating   -ultrasound image captured on disc.  Assessment  Injection assessment: negative aspiration, negative parasthesia and local visualized surrounding nerve  Paresthesia pain: none  Heart rate change: no  Slow fractionated injection: yes  Pain Tolerance: comfortable throughout block      Additional Notes  20mls bilaterally Bupivacaine 0.375% 1:200,000 EPI

## 2023-08-30 NOTE — ASSESSMENT & PLAN NOTE
Endocrinology consulted for BG management.   BG goal 110-140 (CTS Protocol)    - IIP  - Requires intensive BG monitoring.   - BG checks q1hr  - Hypoglycemia protocol in place    - Notify endocrine if patient becomes hypokalemic (K < 3.3) and/or is not responding to replacement.   - Notify endocrine if patient requiring > 20 units/hr.      ** Please notify Endocrine for any change and/or advance in diet**  ** Please call Endocrine for any BG related issues **    Discharge Planning:   TBD. Please notify endocrinology prior to discharge.

## 2023-08-30 NOTE — ANESTHESIA PROCEDURE NOTES
Intubation    Date/Time: 8/30/2023 8:30 AM    Performed by: Kuldeep Armendariz MD  Authorized by: Flo Conley MD    Intubation:     Induction:  Intravenous    Intubated:  Postinduction    Mask Ventilation:  Easy with oral airway    Attempts:  1    Attempted By:  Resident anesthesiologist    Method of Intubation:  Direct    Blade:  Mcmullen 2    Laryngeal View Grade: Grade I - full view of cords      Difficult Airway Encountered?: No      Complications:  None    Airway Device:  Oral endotracheal tube    Airway Device Size:  7.5    Style/Cuff Inflation:  Cuffed (inflated to minimal occlusive pressure)    Inflation Amount (mL):  5    Tube secured:  25    Secured at:  The lips    Placement Verified By:  Capnometry    Complicating Factors:  None    Findings Post-Intubation:  BS equal bilateral and atraumatic/condition of teeth unchanged

## 2023-08-30 NOTE — CONSULTS
[facility] - [facility] Int[facility]  Endocrinology  Diabetes Consult Note    Consult Requested by: Lazaro Garvey MD   Reason for admit: CAD, multiple vessel    HISTORY OF PRESENT ILLNESS:  Reason for Consult: Management of T2DM, Hyperglycemia     Surgical Procedure and Date: CABG on 8/30/2023    Diabetes diagnosis year: LY (intubated)    Home Diabetes Medications:  Metformin 1000 mg BID    How often checking glucose at home?  LY    BG readings on regimen: LY  Hypoglycemia on the regimen?  LY  Missed doses on regimen?  LY    Diabetes Complications include:     Hyperglycemia    Complicating diabetes co morbidities:   CTS      HPI: Jason Barros is a 72 y.o. male who presents s/p CABG surgery. Endocrine consulted to manage hyperglycemia and type 2 diabetes.    Lab Results   Component Value Date    HGBA1C 5.5 08/29/2023                 Interval HPI:   Overnight events: No acute events overnight. Patient in room 03635/96617 A. Blood glucose stable. BG at goal on current insulin regimen (IIP). Steroid use- None. Day of Surgery  Renal function- Normal   Vasopressors-  Epinephrine       Endocrine will continue to follow and manage insulin orders inpatient.         Diet NPO Except for: Sips with Medication     Eating:   NPO  Nausea: No  Hypoglycemia and intervention: No  Fever: No  TPN and/or TF: No      PMH, PSH, FH, SH updated and reviewed     ROS:  Review of Systems  LY intubated   Current Medications and/or Treatments Impacting Glycemic Control  Immunotherapy:    Immunosuppressants       None          Steroids:   Hormones (From admission, onward)      None          Pressors:    Autonomic Drugs (From admission, onward)      Start     Stop Route Frequency Ordered    08/30/23 1615  EPINEPHrine 5 mg in dextrose 5% 250 mL infusion (premix)        Question Answer Comment   Begin at (in mcg/kg/min): 0.02    Titrate by: (in mcg/kg/min) 0.02    Titrate interval: (in minutes) 5    Titrate to maintain: (SBP or MAP  "or Cardiac Index) MAP    Greater than: (in mmHg) 65    Cardiac index greater than: (in L/min) 2.2    Maximum dose: (in mcg/kg/min) 2        -- IV Continuous 08/30/23 1604          Hyperglycemia/Diabetes Medications:   Antihyperglycemics (From admission, onward)      Start     Stop Route Frequency Ordered    08/30/23 1615  insulin regular in 0.9 % NaCl 100 unit/100 mL (1 unit/mL) infusion        Question Answer Comment   Insulin rate changes (DO NOT MODIFY ANSWER) \\[email or web address]\epic\Images\Pharmacy\InsulinInfusions\CTS INSULIN [id number].pdf    Enter initial dose (Units/hr): 1        -- IV Continuous 08/30/23 1604             PHYSICAL EXAMINATION:  Vitals:    08/30/23 1640   BP:    Pulse: 65   Resp: 20   Temp:      Body mass index is 30.49 kg/m².     Physical Exam     Constitutional: Well developed, obese, NAD.  ENT: External ears no masses with nose patent  Neck: Supple; trachea midline  Cardiovascular: Normal heart sounds, no LE edema. DP +2 bilaterally.  Lungs: Normal effort; lungs anterior bilaterally clear to auscultation.  Intubated on a ventilator.  Abdomen: Soft, no masses, no hernias.  Hypoactive BS noted.  MS: No clubbing or cyanosis of nails noted; unable to assess gait.  Skin: No rashes, lesions, or ulcers; no nodules.  Injection sites are ok. No lipo hypertropthy or atrophy.  Mid-sternal incision with telfa island dressing, CDI.  CT x 2.  LLE wrapped with ACE wrap.  Psychiatric: LY  Neurological: LY  Foot: Nails in good condition, no amputations noted        Labs Reviewed and Include   Recent Labs   Lab 08/30/23  1636   *   CALCIUM 8.1*   ALBUMIN 2.5*   PROT 5.0*      K 4.4   CO2 22*   *   BUN 11   CREATININE 0.7   ALKPHOS 77   ALT 20   AST 41*   BILITOT 0.8     Lab Results   Component Value Date    WBC 15.34 (H) 08/30/2023    HGB 9.1 (L) 08/30/2023    HCT 27 (L) 08/30/2023    MCV 95 08/30/2023     (L) 08/30/2023     No results for input(s): "TSH", "FREET4" in the last 168 " "hours.  Lab Results   Component Value Date    HGBA1C 5.5 08/29/2023       Nutritional status:   Body mass index is 30.49 kg/m².  Lab Results   Component Value Date    ALBUMIN 2.5 (L) 08/30/2023    ALBUMIN 3.6 08/29/2023    ALBUMIN 3.5 08/04/2023     No results found for: "PREALBUMIN"    Estimated Creatinine Clearance: 99.4 mL/min (based on SCr of 0.7 mg/dL).    Accu-Checks  Recent Labs     08/30/23  0705 08/30/23  1638   POCTGLUCOSE 90 150*        ASSESSMENT and PLAN    Cardiac/Vascular  * CAD, multiple vessel  Managed per primary team  Avoid hypoglycemia        Hyperlipidemia    On statin therapy per ADA guidelines.     Endocrine  Type 2 diabetes mellitus  Endocrinology consulted for BG management.   BG goal 110-140 (CTS Protocol)    - IIP  - Requires intensive BG monitoring.   - BG checks q1hr  - Hypoglycemia protocol in place    - Notify endocrine if patient becomes hypokalemic (K < 3.3) and/or is not responding to replacement.   - Notify endocrine if patient requiring > 20 units/hr.      ** Please notify Endocrine for any change and/or advance in diet**  ** Please call Endocrine for any BG related issues **    Discharge Planning:   TBD. Please notify endocrinology prior to discharge.            Plan discussed with patient, family, and RN at bedside.        Femi Miranda, DNP, FNP  Endocrinology  [facility] - [facility]  "

## 2023-08-30 NOTE — SUBJECTIVE & OBJECTIVE
Interval HPI:   Overnight events: No acute events overnight. Patient in room 22776/21842 A. Blood glucose stable. BG at goal on current insulin regimen (IIP). Steroid use- None. Day of Surgery  Renal function- Normal   Vasopressors-  Epinephrine       Endocrine will continue to follow and manage insulin orders inpatient.         Diet NPO Except for: Sips with Medication     Eating:   NPO  Nausea: No  Hypoglycemia and intervention: No  Fever: No  TPN and/or TF: No      PMH, PSH, FH, SH updated and reviewed     ROS:  Review of Systems  LY intubated   Current Medications and/or Treatments Impacting Glycemic Control  Immunotherapy:    Immunosuppressants       None          Steroids:   Hormones (From admission, onward)      None          Pressors:    Autonomic Drugs (From admission, onward)      Start     Stop Route Frequency Ordered    08/30/23 1615  EPINEPHrine 5 mg in dextrose 5% 250 mL infusion (premix)        Question Answer Comment   Begin at (in mcg/kg/min): 0.02    Titrate by: (in mcg/kg/min) 0.02    Titrate interval: (in minutes) 5    Titrate to maintain: (SBP or MAP or Cardiac Index) MAP    Greater than: (in mmHg) 65    Cardiac index greater than: (in L/min) 2.2    Maximum dose: (in mcg/kg/min) 2        -- IV Continuous 08/30/23 1604          Hyperglycemia/Diabetes Medications:   Antihyperglycemics (From admission, onward)      Start     Stop Route Frequency Ordered    08/30/23 1615  insulin regular in 0.9 % NaCl 100 unit/100 mL (1 unit/mL) infusion        Question Answer Comment   Insulin rate changes (DO NOT MODIFY ANSWER) \\[email or web address]\epic\Images\Pharmacy\InsulinInfusions\CTS INSULIN [id number].pdf    Enter initial dose (Units/hr): 1        -- IV Continuous 08/30/23 1604             PHYSICAL EXAMINATION:  Vitals:    08/30/23 1640   BP:    Pulse: 65   Resp: 20   Temp:      Body mass index is 30.49 kg/m².     Physical Exam     Constitutional: Well developed, obese, NAD.  ENT: External ears no masses with nose  patent  Neck: Supple; trachea midline  Cardiovascular: Normal heart sounds, no LE edema. DP +2 bilaterally.  Lungs: Normal effort; lungs anterior bilaterally clear to auscultation.  Intubated on a ventilator.  Abdomen: Soft, no masses, no hernias.  Hypoactive BS noted.  MS: No clubbing or cyanosis of nails noted; unable to assess gait.  Skin: No rashes, lesions, or ulcers; no nodules.  Injection sites are ok. No lipo hypertropthy or atrophy.  Mid-sternal incision with telfa island dressing, CDI.  CT x 2.  LLE wrapped with ACE wrap.  Psychiatric: LY  Neurological: LY  Foot: Nails in good condition, no amputations noted

## 2023-08-30 NOTE — ANESTHESIA PROCEDURE NOTES
[facility]    Diagnosis: Coronary Artery Disease  Doctor requesting consult: Dr. Garvey  Patient location during procedure: done in OR  Procedure start time: 8/30/2023 8:32 AM  Timeout: 8/30/2023 8:30 AM  Procedure end time: 8/30/2023 8:40 AM    Staffing  Authorizing Provider: Flo Conley MD  Performing Provider: Kuldeep Armendariz MD    Staffing  Performed by: Kuldeep Armendariz MD  Authorized by: Flo Conley MD    Preanesthetic Checklist  Completed: patient identified, IV checked, site marked, risks and benefits discussed, surgical consent, monitors and equipment checked, pre-op evaluation, timeout performed and anesthesia consent given  [facility]  Skin Prep: povidone-iodine 7.5% surgical scrub  Location: right,  internal jugular vein  Vessel Caliber: large, patent, compressibility normal  Introducer: 9 Fr single lumen, manometry used.  Device: standard thermodilution catheter   Catheter Size: 7.5 Fr  Catheter placement by yes. Heme positive aspiration all ports. PAC floated with balloon up not wedgedSterile sheath used  Locked at: 55 cm.Insertion Attempts: 1  Indication: hemodynamic monitoring  Ultrasound Guidance  Needle advanced into vessel with real time Ultrasound guidance.  Guidewire confirmed in vessel.  Sterile sheath used.  Assessment  Central Line Bundle Protocol followed. Hand hygiene before procedure, surgical cap worn, surgical mask worn, sterile surgical gloves worn, large sterile drape used.  Verification: ultrasound and blood return  Dressing: sutured in place and taped  Patient: Tolerated Well

## 2023-08-30 NOTE — ASSESSMENT & PLAN NOTE
TTE as of 7/2023 with EF 25%, RV dysfunction.   GDMT: toprol and valsartan, will hold in immediate post-operative setting and restart when clinically appropriate

## 2023-08-30 NOTE — ASSESSMENT & PLAN NOTE
Ongoing SW/CM Assessment/Plan of Care Note     See SW/CM flowsheets for goals and other objective data.    Progress note:   SW spoke wthe Pt's wife via phone. Wife prefers 1)[facility] 2)[place] 3)[place] for [facility]. SW explained very limited bed availability on [facility]. Referrals sent to b/u options.    .OUTPATIENT DIALYSIS CHAIR NOTE    Placement: New Placement  Facility: [facility]  Schedule/Time: TBD  Additional Comments: New OP HD pending with [facility] & [facility]. Covid test sent via ecin, SW to send hep labs when available. [facility]ITAL[facility] covid questions answered via ecin as well.            Neuro/Psych:     - Sedation: propofol    - Pain:    - Scheduled Tylenol 1g q8h   - Fentanyl PRN while intubated, Oxy PRN             Cardiac:     - S/P CABGx with Dr. Garvey on 8/30/2023    - BP Goal: MAP 60-80, SBP < 140    - Cleviprex PRN    - Pressors:    - Anti-HTNs: Will start when appropriate    - Rhythm: NSR    - Beta blocker: Will start when appropriate    - Statin: Atorvastatin 40 mg QD    - AICD       Pulmonary:     - Goal SpO2 >92%    - Will wean ventilator support as tolerated to extubate    - Chest Tubes x 4 (2 Meds & 2 Pleural)    - ABGs PRN      Renal:    - Trend BUN/Cr     - Maintain Saenz, record strict Is/Os    Recent Labs   Lab 08/29/23  0920   BUN 14   CREATININE 0.9         FEN / GI:     - Daily CMP, PRN K/Mag/Phos per protocol     - Replace electrolytes as needed    - Nutrition: NPO pending extubation    - Bowel Regimen: Miralax, docusate      ID:     - Afebrile    - WBC stable    - Abx: Complete perioperative cefazolin 2g Q8H x 5 doses    Recent Labs   Lab 08/29/23  0920   WBC 6.95         Heme/Onc:     - Hgb 11.8 pre-operatively    - CBC daily    - ASA 325mg tonight, DAPT tomorrow     Recent Labs   Lab 08/29/23  0920   HGB 11.8*      APTT 25.9   INR 1.3*         Endocrine:     - CTS Goal -140    - HgbA1c: 5.5    - Endocrinology consulted for insulin management      PPx:   Feeding: npo  Analgesia/Sedation: multimodal  Thromboembolic Prevention: SCDs  HOB >30: Yes  Stress Ulcer: pepcid  Glucose Control: Yes, insulin management per Endocrinology     Lines/Drains/Airway:   ETT   Right radial arterial line   RIJ CVC   Saenz   Chest Tubes:4   Pacing Wires: Temporary ventricular pacing wires      Dispo/Code Status/Palliative:     - Continue SICU Care    - Full Code

## 2023-08-30 NOTE — ANESTHESIA PROCEDURE NOTES
[facility]    Diagnosis: CAD  Patient location during procedure: OR  Exam type: Baseline    Staffing  Performed: anesthesiologist and fellow     Anesthesiologist: Flo Conley MD        Anesthesiologist Present  Yes      Setup & Induction  Patient preparation: bite block inserted  Probe Insertion: easy  Exam: completeDoppler Echo: 2D, continuous wave Doppler, 3D, color flow mapping and pulse wave Doppler.  Exam     Left Heart  Left Atruim: mildly abnormal (men 4.1-4.6; women 3.9-4.2)  Left appendage velocity:28.5    Left Ventricle: 6.25 cm, mildy abnormal (men 6.0-6.3; women 5.3-5.6)  LV Wall Thickness (posterior wall):cm, mildly abnormal (men 1.1-1.3 cm; women 1.0-1.2 cm)  Modified Simpsons: 20 %    LVAD:no  Estimated Ejection Fraction: < 25% severe  Regional Wall Abnormalities: RWMA present    Regional Wall Abnormalities    Four Chamber ME   Two Chamber ME   Longitudinal ME  TG Transversal MP  TG Transversal Basal  Inferoseptal: 3  Inferior: 3  Inferolateral: 2  Anteroseptal: 3  Anterior: 2  Anterolateral: 2        Right Heart  Right Ventricle: dilated  Right Ventricle Function: moderately decreased  Right Atria:  moderately abnormal    Intra Atrial Septum  PFO: no shunt by color flow doppler  no IAS aneurysm  no lipomatous hypertrophy  no Atrial Septal Defect (Asd)    Right Ventricle  Size: dilated, Free Wall Thickness: normal    Aortic Valve:  Stenosis: none.  Morphology: trileaflet    Regurgitation: no aortic valve regurgitation      Mitral Valve:   Morphology:normal  Prolapse: none  Flail: no flail  Jet Description: mild-to-moderate and centrally-directed    Tricuspid Valve:  Morphology: normal  Regurgitation: mild    Pulmonic Valve:  Morphology:normal  Regurgitation(color flow): mild    Great Vessels  Ascending Aorta Atherosclerosis: 2=mild dz (<3mm)  Aortic Arch Atherosclerosis: 2=mild dz (<3mm)  IABP: no  Descending Aorta Atherosclerosis: 2=mild dz (<3mm)  Aorta    Descending aorta IABP: no    Effusions  none    SummaryFindings discussed with surgeon.    Other Findings   Pre:  -Gtt: Epi 0.1  -Reduced systolic function of the LV with an estimated EF of 15-20%  -RWMA as described in detail above  -Moderately Reduced systolic function of the RV  -Mild to moderate MR, Mild TR, Mild PI  -No other valvular abnormalities appreciated  -No PFO, ASD, or VSD appreciated  -LEOLA velocity 28.5 cm/s    Post:  -Gtt: Epi 0.04  -LV function modestly improved with an estimated EF of 20-25%  -No new RWMA  -RV systolic function moderately depressed  -Valvular abnormalities unchanged    Probe removed without complications

## 2023-08-30 NOTE — PROGRESS NOTES
Autotransfusion/Rapid Infusion Record:      08/30/2023  Autotransfusionist:  Luis Armando Leigh    Surgeon(s) and Role:     * Lazaro Garvey MD - Primary     * Justo Holloway MD - Fellow     * Cash Narvaez DO - Fellow  Anesthesiologist:  Flo Conley MD    Past Medical History:   Diagnosis Date    Arthritis     Diabetes mellitus, type 2     Hypertension        Procedure(s) (LRB):  CABG, REPEAT Redo Sternotomy (N/A)  SURGICAL PROCUREMENT, VEIN, ENDOSCOPIC (Left)     4:20 PM    Equipment:    Cell Saver     R.I.S.  : Pins Model: CATSmart or CATSplus : [place]   Model: [id number]     Serial number: [id number]   Serial number:    Disposable lot #: [id number]   Disposable lot #:      Were extra cardiotomies used for cell saver?  no   if yes, #:      Solutions:  Anticoagulant: ACD-A   Expiration date: 04/24 Volume used: 300   Wash solution: 0.9% NaCl   Expiration date: 03/25 Volume used: 3311     Cell saver checklist  Time completed:  07:00         [x]   Circuit assembled correctly     [x]   Cell saver powered and operational     [x]   Vacuum connected, functional, adjust to max -150mmHg     [x]   Anticoagulant drip rate adjusted     [x]   Transfer bag properly labeled with patient name, expiration time, volume,       anticoagulant, OR number, and initials     [x]   Cell saver disinfected after use (completed at end of case)       Cell Saver volumes:    Total volume processed:     3194 mL     Total volume pRBCs recovered     510 mL     Volume pRBCs infused     510 mL         RIS checklist   Time completed:  []   RIS circuit assembled correctly     []   RIS power and operational     []   RIS disinfected after use (completed at end of case)       RIS volumes:    Total volume infused:    (see anesthesia record for blood       product information)    mL       Additional comments:

## 2023-08-30 NOTE — HPI
Reason for Consult: Management of T2DM, Hyperglycemia     Surgical Procedure and Date: CABG on 8/30/2023    Diabetes diagnosis year: LY (intubated)    Home Diabetes Medications:  Metformin 1000 mg BID    How often checking glucose at home?  LY    BG readings on regimen: LY  Hypoglycemia on the regimen?  LY  Missed doses on regimen?  LY    Diabetes Complications include:     Hyperglycemia    Complicating diabetes co morbidities:   CTS      HPI: Jason Barros is a 72 y.o. male who presents s/p CABG surgery. Endocrine consulted to manage hyperglycemia and type 2 diabetes.    Lab Results   Component Value Date    HGBA1C 5.5 08/29/2023

## 2023-08-30 NOTE — BRIEF OP NOTE
[facility] - Surgery ([facility])  Cardiothoracic Surgery  Operative Note    SUMMARY     Date of Procedure: 8/30/2023     Procedure: Procedure(s) (LRB):    1)  REDO CABG with LIMA-LAD and SVG-OM  77424, 08325, 03480    2)  REPEAT Redo Sternotomy  66967      Surgeon(s) and Role:     * Lazaro Garvey MD - Primary     * Justo Holloway MD - Fellow     * Cash Narvaez DO - Fellow    Assisting Surgeon: None    Pre-Operative Diagnosis: CAD, multiple vessel [I25.10]    Post-Operative Diagnosis: Post-Op Diagnosis Codes:     * CAD, multiple vessel [I25.10]    Anesthesia: General    Operative Findings (including complications, if any): Moderate intrapericardial adhesions. Diffuse disease in all epicardial vessels.    Estimated Blood Loss (EBL):  100 mL  Specimens:   Specimen (24h ago, onward)      None           Attestation:  I was present and scrubbed for the procedure.

## 2023-08-31 NOTE — ASSESSMENT & PLAN NOTE
Endocrinology consulted for BG management.   BG goal 140-180     - Transition drip at 0.8 units/hr with step-down parameters.   - Novolog (aspart) insulin prn for BG excursions LDC SSI (150/50).  - BG checks q4hr  - Hypoglycemia protocol in place    ** Please notify Endocrine for any change and/or advance in diet**  ** Please call Endocrine for any BG related issues **    Discharge Planning:   TBD. Please notify endocrinology prior to discharge.

## 2023-08-31 NOTE — NURSING
Bran NP and Dr. Guzman at bedside assessing patient's and placing orders.    Patient's lactate increasing and SvO2 decreasing.    Dobutamine gtt initiated and Epi gtt increased to 0.12 mcg/kg/min.    Total 750 mL of 5% albumin administered.    STAT CBC collected, awaiting results.    Dr. Holloway aware of the above.

## 2023-08-31 NOTE — ASSESSMENT & PLAN NOTE
  Neuro/Psych:     - Sedation: propofol, off precedex    - Pain:    - Scheduled Tylenol 1g q8h, robaxin   - Oxy 5, 10 PRN hydromorphone for breakthrough,  - gabapentin home med             Cardiac:     - S/P CABGx with Dr. Garvey on 8/30/2023    - BP Goal: MAP 60-80, SBP < 140    - Pressors: Levo, Epi, wean as able    - Anti-HTNs: Will start when appropriate    - Rhythm: NSR, AICD    - Beta blocker: Will start when appropriate, takes Toprol 25 daily at  home    - Statin: Atorvastatin 40 mg QD      Pulmonary:     - Goal SpO2 >92%    - Will wean ventilator support as tolerated to extubate    - Chest Tubes x 4 (2 Meds & 2 Pleural), 210cc, 210cc    - ABGs PRN    - Albuterol nebs q4    - SBT, parameters and extubate today if indicated      Renal:    - Trend BUN/Cr     - Maintain Saenz, record strict Is/Os    - 2.2L/24Hr , +940cc   - Goal > 0.5 cc/kg/hour    Recent Labs   Lab 08/29/23  0920 08/30/23  1636 08/31/23  0343   BUN 14 11 15   CREATININE 0.9 0.7 0.9         FEN / GI:     - Daily CMP, PRN K/Mag/Phos per protocol     - Replace electrolytes as needed    - Nutrition: NPO pending extubation    - Bowel Regimen: Miralax, docusate    -  protonix at home, 40 mg daily, restart, dc famotidine      ID:     - Afebrile    - WBC stable, 13.44    - Abx: Complete perioperative cefazolin 2g Q8H x 5 doses    - No concerns for infection at this time    Recent Labs   Lab 08/30/23  1636 08/30/23  2212 08/31/23  0343   WBC 15.34* 14.03* 13.44*         Heme/Onc:     - Hgb 11.8 pre-operatively    - CBC daily    - ASA 325mg tonight, DAPT tomorrow     - Start plavix today per CTS    Recent Labs   Lab 08/30/23  1636 08/30/23  2212 08/30/23  2303 08/31/23  0343   HGB 9.1* 6.5*  --  9.2*   * 123*  --  109*   APTT 36.9*  --  30.5 29.2   INR 1.5*  --  1.7* 1.6*         Endocrine:     - CTS Goal -140    - Insulin gtt    - HgbA1c: 5.5    - Endocrinology consulted for insulin management      PPx:   Feeding:  npo  Analgesia/Sedation: multimodal  Thromboembolic Prevention: SCDs  HOB >30: Yes  Stress Ulcer: pepcid  Glucose Control: Yes, insulin management per Endocrinology     Lines/Drains/Airway:   ETT   Right radial arterial line   RIJ CVC   Saenz   Chest Tubes:4   Pacing Wires: Temporary ventricular pacing wires      Dispo/Code Status/Palliative:     - Continue SICU Care    - Full Code

## 2023-08-31 NOTE — SUBJECTIVE & OBJECTIVE
"Interval HPI:   Overnight events: No acute events overnight. Patient in room 63287/89061 A. Blood glucose stable. BG at goal on current insulin regimen (IIP). Steroid use- None. 1 Day Post-Op  Renal function- Normal   Vasopressors-  Epinephrine  and Norepinephrine       Endocrine will continue to follow and manage insulin orders inpatient.         Diet NPO     Eating:   NPO  Nausea: No  Hypoglycemia and intervention: No  Fever: No  TPN and/or TF: No      /66   Pulse 70   Temp (!) 100.6 °F (38.1 °C)   Resp (!) 25   Ht 5' 6" (1.676 m)   Wt 85.7 kg (188 lb 15 oz)   SpO2 97%   BMI 30.49 kg/m²     Labs Reviewed and Include    Recent Labs   Lab 08/31/23 0343 08/31/23 0926   *  --    CALCIUM 7.6*  --    ALBUMIN 3.3*  --    PROT 4.8*  --      --    K 4.7 4.4   CO2 20*  --    *  --    BUN 15  --    CREATININE 0.9  --    ALKPHOS 53*  --    ALT 16  --    AST 33  --    BILITOT 1.1*  --      Lab Results   Component Value Date    WBC 13.44 (H) 08/31/2023    HGB 9.2 (L) 08/31/2023    HCT 28 (L) 08/31/2023    MCV 88 08/31/2023     (L) 08/31/2023     No results for input(s): "TSH", "FREET4" in the last 168 hours.  Lab Results   Component Value Date    HGBA1C 5.5 08/29/2023       Nutritional status:   Body mass index is 30.49 kg/m².  Lab Results   Component Value Date    ALBUMIN 3.3 (L) 08/31/2023    ALBUMIN 2.5 (L) 08/30/2023    ALBUMIN 3.6 08/29/2023     No results found for: "PREALBUMIN"    Estimated Creatinine Clearance: 77.3 mL/min (based on SCr of 0.9 mg/dL).    Accu-Checks  Recent Labs     08/30/23  1807 08/30/23  1902 08/30/23  1957 08/30/23  2301 08/31/23  0008 08/31/23  0111 08/31/23  0339 08/31/23  0720 08/31/23  0809 08/31/23  0925   POCTGLUCOSE 138* 159* 153* 147* 152* 134* 134* 108 103 123*       Current Medications and/or Treatments Impacting Glycemic Control  Immunotherapy:    Immunosuppressants       None          Steroids:   Hormones (From admission, onward)      Start     " Stop Route Frequency Ordered    08/31/23 0015  vasopressin (PITRESSIN) 0.2 Units/mL in dextrose 5 % (D5W) 100 mL infusion         -- IV Continuous 08/30/23 2302          Pressors:    Autonomic Drugs (From admission, onward)      Start     Stop Route Frequency Ordered    08/30/23 1916  NORepinephrine 4 mg in dextrose 5% 250 mL infusion (premix)        Question Answer Comment   Begin at (in mcg/kg/min): 0.02    Titrate by: (in mcg/kg/min) 0.02    Titrate interval: (in minutes) 5    Titrate to maintain: (MAP or SBP) MAP    Greater than: (in mmHg) 65    Maximum dose: (in mcg/kg/min) 3        -- IV Continuous 08/30/23 1916 08/30/23 1615  EPINEPHrine 5 mg in dextrose 5% 250 mL infusion (premix)        Question Answer Comment   Begin at (in mcg/kg/min): 0.02    Titrate by: (in mcg/kg/min) 0.02    Titrate interval: (in minutes) 5    Titrate to maintain: (SBP or MAP or Cardiac Index) MAP    Greater than: (in mmHg) 65    Cardiac index greater than: (in L/min) 2.2    Maximum dose: (in mcg/kg/min) 2        -- IV Continuous 08/30/23 1604          Hyperglycemia/Diabetes Medications:   Antihyperglycemics (From admission, onward)      Start     Stop Route Frequency Ordered    08/31/23 1115  insulin regular in 0.9 % NaCl 100 unit/100 mL (1 unit/mL) infusion        Question:  Enter initial dose (Units/hr):  Answer:  0.8    -- IV Continuous 08/31/23 1014    08/31/23 1113  insulin aspart U-100 pen 0-5 Units         -- SubQ As needed (PRN) 08/31/23 1014

## 2023-08-31 NOTE — CONSULTS
"Unable to see pt today in hospital. Information packet sent to patient re: Phase 2 cardiac rehab, which includes "Your Guide to Living with Heart Disease".  Letter was also sent to patient.    Marino Batres RN  Cardiac Rehab Nurse   "

## 2023-08-31 NOTE — NURSING
Notified Dr. Vinson of decreased UOP x2h. Increase milrinone to 0.25 mcg/kg/min. Also, poor NIF on repeat attempt. Patient maintaining SpO2 86-87%. Place back on rate and increase FiO2 to 50%.

## 2023-08-31 NOTE — CONSULTS
[facility]  Adult Nutrition  Progress Note    SUMMARY       Recommendations    1.) Recommend if and when medically feasible to ADAT with goal of Cardiac diet.     2.) If PO intake <50% of Boost Glucose Control to help optimize PRO/Kcal intake.     3.) If pt is NPO >48hrs and alternative nutrition is medically warranted, recommend initiating TF of Impact Peptide 1.5 at trickle rate and slowly advance to the following goals:     - if pt is sedated on Propofol, recommend TF of Impact Peptide 1.5@45ml/hr to provide 1620kcal, 101g PRO, and 832ml FF- FWF per MD.     - If pt is off sedation with Propofol, recommend Tf goal of Impact Peptide 1.5@55ml/hr to provide 1980kcal, 124g PRO, and 1016ml FF- FWF per MD.     4.) RD to monitor wt, nutritional status, skin, labs.      Goals: to meet % of EEN/EPN by next RD f/u  Nutrition Goal Status: new  Communication of RD Recs:  (POC)    Assessment and Plan    Nutrition Problem  Increased PRO/energy needs    Related to (etiology):   Increased physiological needs    Signs and Symptoms (as evidenced by):   S/p CABG x2     Interventions/Recommendations (treatment strategy):  Collaboration of nutritional care with other providers.  ADAT vs EN    Nutrition Diagnosis Status:   New      Reason for Assessment    Reason For Assessment: consult  Diagnosis: cardiac disease (CAD, multiple vessel; s/p CABG x2)  Relevant Medical History: CAD s/p prior CABG, ICM, HFrEF (LVEF 20%), s/p ICD (7/29/23),  HTN, DM type 2  Interdisciplinary Rounds: did not attend  General Information Comments: RD consulted for s/p cardiac sx. Pt still remains sedated, on vent. Nutritional hx could not be obtained. Caregivers were not present in the room. RD left Low Na and Cardiac diet edu with RD's contact info, in the room. NFPE not appropriate at this time d/t lack of consent. Trace edema present. As per chart review, UBW appears to be between 175-190#.  RD to f/u.  Nutrition Discharge  "Planning: Cardiac diet edu provided on 8/31    Nutrition Risk Screen    Nutrition Risk Screen: no indicators present    Nutrition/Diet History    Spiritual, Cultural Beliefs, [employer]ybeterian Practices, Values that Affect Care: no    Anthropometrics    Temp: 100.2 °F (37.9 °C)  Height Method: Stated  Height: 5' 6" (167.6 cm)  Height (inches): 66 in  Weight: 85.7 kg (188 lb 15 oz)  Weight (lb): 188.94 lb  Ideal Body Weight (IBW), Male: 142 lb  % Ideal Body Weight, Male (lb): 133.06 %  BMI (Calculated): 30.5  BMI Grade: 30 - 34.9- obesity - grade I    Lab/Procedures/Meds    Pertinent Labs Reviewed: reviewed  Pertinent Labs Comments: Gluc: 138, Ca: 7.6, phos: 4.6, alk phos: 53, PRO: 4.8, alb: 3.3  Pertinent Medications Reviewed: reviewed  Pertinent Medications Comments: famotidine, docusate, polyethylene glycol, NaCl, dobutamine, epi, milrinone, nor-epi, propofol, vasopressin    Estimated/Assessed Needs    Weight Used For Calorie Calculations: 85.7 kg (188 lb 15 oz)  Energy Calorie Requirements (kcal): 0315-4446 kcal  Energy Need Method: Kcal/kg (20kcal/kg)  Protein Requirements: 103- 129g (1.2-1.5g/kg)  Weight Used For Protein Calculations: 85.7 kg (188 lb 15 oz)  Fluid Requirements (mL): 1ml/1kcal or per MD  Estimated Fluid Requirement Method: RDA Method  RDA Method (mL): 1714  CHO Requirement: 214- 268g    Nutrition Prescription Ordered    Current Diet Order: NPO    Evaluation of Received Nutrient/Fluid Intake    Other Calories (kcal): 302 (From 274.6ml of propofol)  I/O: +46.1ml since admit  Energy Calories Required: not meeting needs  Protein Required: not meeting needs  Fluid Required:  (as per MD)  Comments: LBM 8/29  % Intake of Estimated Energy Needs: 0 - 25 %  % Meal Intake: NPO    Nutrition Risk    Level of Risk/Frequency of Follow-up:  (RD to f/u 1-2/week)     Monitor and Evaluation    Food and Nutrient Intake: energy intake  Food and Nutrient Adminstration: diet order  Knowledge/Beliefs/Attitudes: food and " nutrition knowledge/skill, beliefs and attitudes  Physical Activity and Function: nutrition-related ADLs and IADLs  Anthropometric Measurements: weight, weight change, body mass index  Biochemical Data, Medical Tests and Procedures: electrolyte and renal panel, gastrointestinal profile, glucose/endocrine profile, inflammatory profile, lipid profile  Nutrition-Focused Physical Findings: overall appearance, skin     Nutrition Follow-Up    RD Follow-up?: Yes

## 2023-08-31 NOTE — NURSING
Nirav Snow to bedside. Unable to extubate patient today due to low NIF. Patient remains RASS -2 despite sedation being off since this AM. Does follow commands and moves all 4 extremities. Aware that unable to doppler DP pulse to LLE.

## 2023-08-31 NOTE — PROGRESS NOTES
Encounter Date: 10/21/2022       History     Chief Complaint   Patient presents with    SINUS CONGESTION    Sore Throat     PT C/O SINUS CONGESTION, HEADACHE AND SORE THROAT FOR 3 DAYS     35-year-old female with history of migraines and chronic sinus issues presents to the emergency department for 2-3 day history of cough associated with nasal congestion, sinus pressure, generalized headache, and pharyngitis.  Current symptoms different from her typical migraines poor are somewhat reminiscent for past sinus issues.  Denies fever and sick contacts.  Denies emesis, chest pain, shortness of breath, and abdominal pain.  No medication prior to arrival.    The history is provided by the patient.   Review of patient's allergies indicates:   Allergen Reactions    Vinyl ether Hives     Past Medical History:   Diagnosis Date    Graves disease     Iron deficiency anemia 2021     Past Surgical History:   Procedure Laterality Date    BREAST SURGERY      Cysts removed     SECTION      DIAGNOSTIC LAPAROSCOPY N/A 2021    Procedure: LAPAROSCOPY, DIAGNOSTIC, possible salpingostomy/ salpingectomy/  D& C;  Surgeon: Cheri Hunter MD;  Location: [facility];  Service: OB/GYN;  Laterality: N/A;    DILATION AND CURETTAGE OF UTERUS  2021    Procedure: DILATION AND CURETTAGE, UTERUS;  Surgeon: Cheri Hunter MD;  Location: [facility] OR;  Service: OB/GYN;;    fibroid removal  ,     breast    HEMANGIOMA EXCISION      right thigh    LAPAROSCOPIC SALPINGECTOMY Right 2021    Procedure: SALPINGECTOMY, LAPAROSCOPIC;  Surgeon: Cheri Hunter MD;  Location: [facility] OR;  Service: OB/GYN;  Laterality: Right;     Family History   Problem Relation Age of Onset    Colon cancer Paternal Grandfather     Breast cancer Maternal Grandmother     Hypertension Maternal Grandmother     Hypertension Father     Throat cancer Father     Pacemaker/defibrilator Mother     Hypertension Mother      labor Neg Hx     Stroke  [facility]  Critical Care - Surgery  Progress Note    Patient Name: Jason Barros  MRN: [id number]  Admission Date: 8/30/2023  Hospital Length of Stay: 1 days  Code Status: Full Code  Attending Provider: Lazaro Garvey MD  Primary Care Provider: Kingsley Painting Jr., MD   Principal Problem: CAD, multiple vessel    Subjective:     Hospital/ICU Course:  Jason Barros is a 73yo M with a PMHx CAD s/p prior CABG (vein graft to distal RCA), ICM, HFrEF (LVEF 20%), s/p ICD,  HTN, DM type 2. LHC done showed three vessel disease: left main - 90%, Prox LAD-1 - 80%, mid lesion - 60%, Lcx - 90% w/ a widely patent RCA graft. TTE from 7/25 showed a severely reduced LVEF  of 25% and moderately to severely reduced right ventricular systolic function.     The patient presents to the SICU s/p redo CABGx2 with Dr. Garvey on 08/30/2023. On admission, they are intubated, sedated with propofol, and in stable condition. Inotropic and vasopressor requirements upon admission are .02 mcg/kg/min of epinephrine and .375mcg/kg/min of milrinone to maintain blood pressure at a MAP 60-80 and SBP < 140. Central access includes a RIJ CVC, arterial access includes a right radial arterial line. They also have 2 pleural and 2 mediastinal chest tubes with appropriate output.      Interval History/Significant Events: Hypotension overnight, started on Levo,. vaso and epi drip increased. Milrinone and dobutamine drips paused.  Intubated, on rate. Plan to extubate today. S/p CABGx2 POD1. Received 2 units overnight for hb 6.5, responded appropriately. Lactate gradually decreased from 4.0 to 1.6 overnight.     Follow-up For: Procedure(s) (LRB):  CABG, REPEAT Redo Sternotomy (N/A)  SURGICAL PROCUREMENT, VEIN, ENDOSCOPIC (Left)    Post-Operative Day: 1 Day Post-Op    Objective:     Vital Signs (Most Recent):  Temp: 100 °F (37.8 °C) (08/31/23 0505)  Pulse: 74 (08/31/23 0505)  Resp: (!) 24 (08/31/23 0505)  BP: 96/62 (08/31/23  0500)  SpO2: 100 % (08/31/23 0505) Vital Signs (24h Range):  Temp:  [95.5 °F (35.3 °C)-100 °F (37.8 °C)] 100 °F (37.8 °C)  Pulse:  [65-83] 74  Resp:  [15-27] 24  SpO2:  [96 %-100 %] 100 %  BP: ()/(41-99) 96/62  Arterial Line BP: ()/(35-65) 68/35     Weight: 85.7 kg (188 lb 15 oz)  Body mass index is 30.49 kg/m².      Intake/Output Summary (Last 24 hours) at 8/31/2023 0556  Last data filed at 8/31/2023 0505  Gross per 24 hour   Intake 3635.2 ml   Output 2695 ml   Net 940.2 ml          Physical Exam  Vitals reviewed.   Constitutional:       General: He is not in acute distress.     Interventions: He is sedated and intubated.   Eyes:      Extraocular Movements: Extraocular movements intact.      Pupils: Pupils are equal, round, and reactive to light.   Neck:      Comments: [facility] CV  Cardiovascular:      Rate and Rhythm: Normal rate and regular rhythm.      Pulses: Normal pulses.      Comments: AICD     Pulmonary:      Effort: No respiratory distress. He is intubated.   Abdominal:      General: There is no distension.      Palpations: Abdomen is soft.   Musculoskeletal:      Right lower leg: No edema.      Left lower leg: No edema.   Skin:     General: Skin is warm and dry.      Capillary Refill: Capillary refill takes less than 2 seconds.      Coloration: Skin is not jaundiced.      Comments: MSI clean dry and intact  Chest tubes in place    Neurological:      General: No focal deficit present.            Vents:  Vent Mode: A/C (08/31/23 0323)  Ventilator Initiated: Yes (08/30/23 1630)  Set Rate: 24 BPM (08/31/23 0323)  Vt Set: 420 mL (08/31/23 0323)  Pressure Support: 8 cmH20 (08/30/23 2024)  PEEP/CPAP: 5 cmH20 (08/31/23 0323)  Oxygen Concentration (%): 50 (08/31/23 0505)  Peak Airway Pressure: 24 cmH20 (08/31/23 0323)  Plateau Pressure: 21 cmH20 (08/31/23 0323)  Total Ve: 11.3 L/m (08/31/23 0323)  Negative Inspiratory Force (cm H2O): 0 (08/31/23 0323)  F/VT Ratio<105 (RSBI): (!) 50.85 (08/31/23  Neg Hx     Diabetes Neg Hx      Social History     Tobacco Use    Smoking status: Never    Smokeless tobacco: Never   Substance Use Topics    Alcohol use: Yes     Comment: occasional    Drug use: No     Review of Systems   Constitutional:  Negative for fever.   HENT:  Positive for congestion, sinus pressure, sinus pain and sore throat. Negative for trouble swallowing and voice change.    Respiratory:  Positive for cough. Negative for shortness of breath.    Cardiovascular:  Negative for chest pain.   Gastrointestinal:  Negative for abdominal pain, nausea and vomiting.   Musculoskeletal:  Negative for back pain, joint swelling and neck pain.   Skin:  Negative for color change and wound.   Neurological:  Positive for headaches. Negative for numbness.   All other systems reviewed and are negative.    Physical Exam     Initial Vitals [10/21/22 1950]   BP Pulse Resp Temp SpO2   (!) 148/101 96 20 98.9 °F (37.2 °C) 97 %      MAP       --         Physical Exam    Nursing note and vitals reviewed.  Constitutional: She appears well-developed and well-nourished. She is not diaphoretic. No distress.   HENT:   Head: Atraumatic.   Nasal congestion and rhinorrhea present.  No sinus tenderness or erythema.  Bilateral non purulent ear effusions with TMs otherwise normal.  Ear canals and mastoids normal.  Cobblestoning to posterior oropharynx with oropharynx otherwise normal.  No meningismus or adenopathy.    Eyes: Conjunctivae and EOM are normal.   Neck: No tracheal deviation present.   Normal range of motion.  Cardiovascular:  Normal rate and regular rhythm.           Pulmonary/Chest: No accessory muscle usage or stridor. No tachypnea. No respiratory distress.   Musculoskeletal:         General: No edema. Normal range of motion.      Cervical back: Normal range of motion.     Neurological: She is alert and oriented to person, place, and time. She has normal strength. She displays no tremor. She displays no seizure activity.  0323)    Lines/Drains/Airways       Central Venous Catheter Line  Duration              Introducer with Double Lumen Internal Jugular Right -- days    Pulmonary Artery Catheter Assessment  08/30/23 0832 <1 day              Drain  Duration                  Urethral Catheter 08/30/23 0839 Non-latex;Straight-tip;Temperature probe 14 Fr. <1 day         Y Chest Tube 1 and 2 08/30/23 1504 1 Right Pleural 19 Fr. 2 Left Pleural 19 Fr. <1 day         Y Chest Tube 3 and 4 08/30/23 1505 3 Anterior Mediastinal 19 Fr. 4 Anterior Mediastinal 19 Fr. <1 day              Airway  Duration                  Airway - Non-Surgical 08/30/23 0830 <1 day              Arterial Line  Duration             Arterial Line 08/30/23 0816 Right Other (Comment) <1 day              Line  Duration                  Pacer Wires 08/30/23 1443 <1 day              Peripheral Intravenous Line  Duration                  Peripheral IV - Single Lumen 08/30/23 0723 20 G Right Hand <1 day         Peripheral IV - Single Lumen 08/30/23 0912 16 G Left Wrist <1 day                    Significant Labs:    CBC/Anemia Profile:  Recent Labs   Lab 08/30/23  1636 08/30/23  1639 08/30/23  2212 08/30/23  2306 08/31/23  0204 08/31/23  0342 08/31/23  0343   WBC 15.34*  --  14.03*  --   --   --  13.44*   HGB 9.1*  --  6.5*  --   --   --  9.2*   HCT 29.1*   < > 21.7*   < > 27* 27* 28.5*   *  --  123*  --   --   --  109*   MCV 95  --  98  --   --   --  88   RDW 17.1*  --  17.2*  --   --   --  17.6*    < > = values in this interval not displayed.        Chemistries:  Recent Labs   Lab 08/29/23  0920 08/30/23  1636 08/30/23  1958 08/31/23  0343    141  --  141   K 4.2 4.4 4.5 4.7    111*  --  112*   CO2 26 22*  --  20*   BUN 14 11  --  15   CREATININE 0.9 0.7  --  0.9   CALCIUM 9.3 8.1*  --  7.6*   ALBUMIN 3.6 2.5*  --  3.3*   PROT 7.2 5.0*  --  4.8*   BILITOT 0.6 0.8  --  1.1*   ALKPHOS 104 77  --  53*   ALT 14 20  --  16   AST 16 41*  --  33   MG  --  1.8  1.8  Coordination and gait normal.   Skin: Skin is intact. Capillary refill takes less than 2 seconds. No rash noted. No erythema.       ED Course   Procedures  Labs Reviewed   POCT URINE PREGNANCY   SARS-COV-2 RDRP GENE   POCT INFLUENZA A/B MOLECULAR   POCT STREP A MOLECULAR          Imaging Results    None          Medications   acetaminophen tablet 1,000 mg (1,000 mg Oral Given 10/21/22 2055)   predniSONE tablet 60 mg (60 mg Oral Given 10/21/22 2054)     Medical Decision Making:   History:   Old Medical Records: I decided to obtain old medical records.  ED Management:  Viral versus allergy mediated sinusitis.  Flu and COVID-19 swabs pending; she [facility]. Pt understands quarantine measures if positive.  Declines strep swab.  No peritonsillar abscess or deep space infection on exam.  No hypoxia, respiratory distress, or fever.  Low suspicion for bacterial pneumonia.  No deficits.                        Clinical Impression:   Final diagnoses:  [J06.9] Viral URI with cough (Primary)        ED Disposition Condition    Discharge Stable          ED Prescriptions       Medication Sig Dispense Start Date End Date Auth. Provider    predniSONE (DELTASONE) 20 MG tablet Take 3 tablets (60 mg total) by mouth once daily. for 5 days 15 tablet 10/21/2022 10/26/2022 Ronny Cintron PA-C    oxymetazoline (AFRIN) 0.05 % nasal spray 1 spray by Nasal route 2 (two) times daily. DO NOT EXCEED USE FOR MORE THAN 3 DAYS IN A ROW. for 3 days 15 mL 10/21/2022 10/24/2022 Ronny Cintron PA-C    promethazine-dextromethorphan (PROMETHAZINE-DM) 6.25-15 mg/5 mL Syrp Take 5 mLs by mouth every 4 (four) hours as needed (cough). 118 mL 10/21/2022 10/31/2022 Ronny Cintron PA-C    PE-DM-ASA/pyjvel-PI-OY-ASA (FAISAL-SELTZER PLUS DAY-NIGHT) 7.8-325 mg(d)/ 6. mg(nt) TEfS Take 2 capsules by mouth every 4 (four) hours as needed. Do not exceed more than 10 capsules in 24 hours. Do not exceed   --  1.6   PHOS  --  3.4  3.4  --  4.6*       ABGs:   Recent Labs   Lab 08/31/23  0346   PH 7.379   PCO2 43.2   HCO3 25.5   POCSATURATED 33*   BE 0     Coagulation:   Recent Labs   Lab 08/31/23  0343   INR 1.6*   APTT 29.2       Significant Imaging:  I have reviewed all pertinent imaging results/findings within the past 24 hours.    Lower left lobe consolidation      Assessment/Plan:     Cardiac/Vascular  * CAD, multiple vessel    Neuro/Psych:     - Sedation: propofol, off precedex    - Pain:    - Scheduled Tylenol 1g q8h, robaxin   - Oxy 5, 10 PRN hydromorphone for breakthrough,  - gabapentin home med             Cardiac:     - S/P CABGx with Dr. Garvey on 8/30/2023    - BP Goal: MAP 60-80, SBP < 140    - Pressors: Levo, Epi, wean as able    - Anti-HTNs: Will start when appropriate    - Rhythm: NSR, AICD    - Beta blocker: Will start when appropriate, takes Toprol 25 daily at  home    - Statin: Atorvastatin 40 mg QD      Pulmonary:     - Goal SpO2 >92%    - Will wean ventilator support as tolerated to extubate    - Chest Tubes x 4 (2 Meds & 2 Pleural), 210cc, 210cc    - ABGs PRN    - Albuterol nebs q4    - SBT, parameters and extubate today if indicated      Renal:    - Trend BUN/Cr     - Maintain Saenz, record strict Is/Os    - 2.2L/24Hr , +940cc   - Goal > 0.5 cc/kg/hour    Recent Labs   Lab 08/29/23  0920 08/30/23  1636 08/31/23  0343   BUN 14 11 15   CREATININE 0.9 0.7 0.9         FEN / GI:     - Daily CMP, PRN K/Mag/Phos per protocol     - Replace electrolytes as needed    - Nutrition: NPO pending extubation    - Bowel Regimen: Miralax, docusate    -  protonix at home, 40 mg daily, restart, dc famotidine      ID:     - Afebrile    - WBC stable, 13.44    - Abx: Complete perioperative cefazolin 2g Q8H x 5 doses    - No concerns for infection at this time    Recent Labs   Lab 08/30/23  1636 08/30/23  2212 08/31/23  0343   WBC 15.34* 14.03* 13.44*         Heme/Onc:     - Hgb 11.8 pre-operatively    - CBC  recommended dose. Children under 12 years of age:  DO NOT USE. 24 each 10/21/2022 10/28/2022 Ronny Cintron PA-C          Follow-up Information       Follow up With Specialties Details Why Contact Info    Fiona White, [facility] Schedule an appointment as soon as possible for a visit in 1 day For re-evaluation [address]  [facility] [address]  [facility] [address] [phone]      [facility] Emergency Medicine Go to  If symptoms worsen [address] [facility] [address] [phone]             Ronny Cintron PA-C  10/21/22 2057     daily    - ASA 325mg tonight, DAPT tomorrow     - Start plavix today per CTS    Recent Labs   Lab 08/30/23  1636 08/30/23  2212 08/30/23  2303 08/31/23  0343   HGB 9.1* 6.5*  --  9.2*   * 123*  --  109*   APTT 36.9*  --  30.5 29.2   INR 1.5*  --  1.7* 1.6*         Endocrine:     - CTS Goal -140    - Insulin gtt    - HgbA1c: 5.5    - Endocrinology consulted for insulin management      PPx:   Feeding: npo  Analgesia/Sedation: multimodal  Thromboembolic Prevention: SCDs  HOB >30: Yes  Stress Ulcer: pepcid  Glucose Control: Yes, insulin management per Endocrinology     Lines/Drains/Airway:   ETT   Right radial arterial line   RIJ CVC   Saenz   Chest Tubes:4   Pacing Wires: Temporary ventricular pacing wires      Dispo/Code Status/Palliative:     - Continue SICU Care    - Full Code      Acute on chronic combined systolic and diastolic heart failure  TTE as of 7/2023 with EF 25%, RV dysfunction.   GDMT: toprol and valsartan, will hold in immediate post-operative setting and restart when clinically appropriate        Lesley Escobedo MD  Critical Care - Surgery  [facility]

## 2023-08-31 NOTE — PLAN OF CARE
[facility]  Initial Discharge Assessment       Primary Care Provider: Kingsley Painting Jr., MD    Admission Diagnosis: CAD, multiple vessel [I25.10]    Admission Date: 8/30/2023  Expected Discharge Date:     Transition of Care Barriers: None    Payor: MEDICARE / Plan: MEDICARE PART A & B / Product Type: Government /     Extended Emergency Contact Information  Primary Emergency Contact: justine story  Mobile Phone: [phone]  Relation: Spouse  Preferred language: English   needed? No  Secondary Emergency Contact: severiano story  Mobile Phone: [phone]  Relation: Son  Preferred language: English   needed? No    Discharge Plan A: Home Health, Home with family, Home  Discharge Plan B: Home with family, Home      [facility] - [place], [place] - [address]  [address]  [place] [address]  Phone: [phone] Fax: [phone]      Initial Assessment (most recent)       Adult Discharge Assessment - 08/31/23 1046          Discharge Assessment    Assessment Type Discharge Planning Assessment     Confirmed/corrected address, phone number and insurance Yes     Confirmed Demographics Correct on Facesheet     Source of Information family     When was your last doctors appointment? 08/15/23     Communicated [place] with patient/caregiver Date not available/Unable to determine     People in Home grandchild(annelise);spouse     Do you expect to return to your current living situation? Yes     Do you have help at home or someone to help you manage your care at home? Yes     Prior to hospitilization cognitive status: No Deficits     Current cognitive status: Coma/Sedated/Intubated     Walking or Climbing Stairs ambulation difficulty, requires equipment     Mobility Management cane     Home Layout Able to live on 1st floor     Equipment Currently Used at Home cane, straight     Readmission within 30 days? Yes     Patient currently being followed by  outpatient case management? No     Do you currently have service(s) that help you manage your care at home? No     Do you take prescription medications? Yes     Do you have prescription coverage? No     Is the patient taking medications as prescribed? yes     Who is going to help you get home at discharge? fAMILY     How do you get to doctors appointments? car, drives self;family or friend will provide     Are you on dialysis? No     Do you take coumadin? No     DME Needed Upon Discharge  cane, straight     Discharge Plan discussed with: Spouse/sig other;Adult children     Transition of Care Barriers None     Discharge Plan A Home Health;Home with family;Home     Discharge Plan B Home with family;Home        Physical Activity    On average, how many days per week do you engage in moderate to strenuous exercise (like a brisk walk)? 0 days     On average, how many minutes do you engage in exercise at this level? 0 min        Financial Resource Strain    How hard is it for you to pay for the very basics like food, housing, medical care, and heating? Not very hard        Housing Stability    In the last 12 months, was there a time when you were not able to pay the mortgage or rent on time? Patient refused     In the last 12 months, was there a time when you did not have a steady place to sleep or slept in a shelter (including now)? Patient refused        Transportation Needs    In the past 12 months, has lack of transportation kept you from medical appointments or from getting medications? Patient refused     In the past 12 months, has lack of transportation kept you from meetings, work, or from getting things needed for daily living? Patient refused        Food Insecurity    Within the past 12 months, you worried that your food would run out before you got the money to buy more. Patient refused     Within the past 12 months, the food you bought just didn't last and you didn't have money to get more. Patient refused         Stress    Do you feel stress - tense, restless, nervous, or anxious, or unable to sleep at night because your mind is troubled all the time - these days? Patient refused        Social Connections    In a typical week, how many times do you talk on the phone with family, friends, or neighbors? Patient refused     How often do you get together with friends or relatives? Patient refused     How often do you attend Congregational or Anabaptism services? Patient refused     How often do you attend meetings of the clubs or organizations you belong to? Patient refused     Are you , , , , never , or living with a partner? Patient refused        Alcohol Use    Q1: How often do you have a drink containing alcohol? Patient refused     Q2: How many drinks containing alcohol do you have on a typical day when you are drinking? Patient refused     Q3: How often do you have six or more drinks on one occasion? Patient refused                   Patient unable to participate in dsicharge planning CM and family discussed discharge patient lives in a 1 story house with his family he is not on dialysis and does not take coumadin he has a ride home

## 2023-08-31 NOTE — NURSING
Dr. Garvey to bedside. Patient hypotensive, with passive leg raise patient's BP modestly improves. Ok for albumin x1. If BP does not reach goal of MAP 60-80, stop milrinone and restart epinephrine.

## 2023-08-31 NOTE — PROGRESS NOTES
"[facility]  Endocrinology  Progress Note    Admit Date: 8/30/2023     Reason for Consult: Management of T2DM, Hyperglycemia     Surgical Procedure and Date: CABG on 8/30/2023    Diabetes diagnosis year: LY (intubated)    Home Diabetes Medications:  Metformin 1000 mg BID    How often checking glucose at home?  LY    BG readings on regimen: LY  Hypoglycemia on the regimen?  LY  Missed doses on regimen?  LY    Diabetes Complications include:     Hyperglycemia    Complicating diabetes co morbidities:   CTS      HPI: Jason Barros is a 72 y.o. male who presents s/p CABG surgery. Endocrine consulted to manage hyperglycemia and type 2 diabetes.    Lab Results   Component Value Date    HGBA1C 5.5 08/29/2023                 Interval HPI:   Overnight events: No acute events overnight. Patient in room 17783/35917 A. Blood glucose stable. BG at goal on current insulin regimen (IIP). Steroid use- None. 1 Day Post-Op  Renal function- Normal   Vasopressors-  Epinephrine  and Norepinephrine       Endocrine will continue to follow and manage insulin orders inpatient.         Diet NPO     Eating:   NPO  Nausea: No  Hypoglycemia and intervention: No  Fever: No  TPN and/or TF: No      /66   Pulse 70   Temp (!) 100.6 °F (38.1 °C)   Resp (!) 25   Ht 5' 6" (1.676 m)   Wt 85.7 kg (188 lb 15 oz)   SpO2 97%   BMI 30.49 kg/m²     Labs Reviewed and Include    Recent Labs   Lab 08/31/23  0343 08/31/23  0926   *  --    CALCIUM 7.6*  --    ALBUMIN 3.3*  --    PROT 4.8*  --      --    K 4.7 4.4   CO2 20*  --    *  --    BUN 15  --    CREATININE 0.9  --    ALKPHOS 53*  --    ALT 16  --    AST 33  --    BILITOT 1.1*  --      Lab Results   Component Value Date    WBC 13.44 (H) 08/31/2023    HGB 9.2 (L) 08/31/2023    HCT 28 (L) 08/31/2023    MCV 88 08/31/2023     (L) 08/31/2023     No results for input(s): "TSH", "FREET4" in the last 168 hours.  Lab Results   Component Value Date " "   HGBA1C 5.5 08/29/2023       Nutritional status:   Body mass index is 30.49 kg/m².  Lab Results   Component Value Date    ALBUMIN 3.3 (L) 08/31/2023    ALBUMIN 2.5 (L) 08/30/2023    ALBUMIN 3.6 08/29/2023     No results found for: "PREALBUMIN"    Estimated Creatinine Clearance: 77.3 mL/min (based on SCr of 0.9 mg/dL).    Accu-Checks  Recent Labs     08/30/23  1807 08/30/23  1902 08/30/23  1957 08/30/23  2301 08/31/23  0008 08/31/23  0111 08/31/23  0339 08/31/23  0720 08/31/23  0809 08/31/23  0925   POCTGLUCOSE 138* 159* 153* 147* 152* 134* 134* 108 103 123*       Current Medications and/or Treatments Impacting Glycemic Control  Immunotherapy:    Immunosuppressants       None          Steroids:   Hormones (From admission, onward)      Start     Stop Route Frequency Ordered    08/31/23 0015  vasopressin (PITRESSIN) 0.2 Units/mL in dextrose 5 % (D5W) 100 mL infusion         -- IV Continuous 08/30/23 2302          Pressors:    Autonomic Drugs (From admission, onward)      Start     Stop Route Frequency Ordered    08/30/23 1916  NORepinephrine 4 mg in dextrose 5% 250 mL infusion (premix)        Question Answer Comment   Begin at (in mcg/kg/min): 0.02    Titrate by: (in mcg/kg/min) 0.02    Titrate interval: (in minutes) 5    Titrate to maintain: (MAP or SBP) MAP    Greater than: (in mmHg) 65    Maximum dose: (in mcg/kg/min) 3        -- IV Continuous 08/30/23 1916    08/30/23 1615  EPINEPHrine 5 mg in dextrose 5% 250 mL infusion (premix)        Question Answer Comment   Begin at (in mcg/kg/min): 0.02    Titrate by: (in mcg/kg/min) 0.02    Titrate interval: (in minutes) 5    Titrate to maintain: (SBP or MAP or Cardiac Index) MAP    Greater than: (in mmHg) 65    Cardiac index greater than: (in L/min) 2.2    Maximum dose: (in mcg/kg/min) 2        -- IV Continuous 08/30/23 1604          Hyperglycemia/Diabetes Medications:   Antihyperglycemics (From admission, onward)      Start     Stop Route Frequency Ordered    08/31/23 " 1115  insulin regular in 0.9 % NaCl 100 unit/100 mL (1 unit/mL) infusion        Question:  Enter initial dose (Units/hr):  Answer:  0.8    -- IV Continuous 08/31/23 1014    08/31/23 1113  insulin aspart U-100 pen 0-5 Units         -- SubQ As needed (PRN) 08/31/23 1014            ASSESSMENT and PLAN    Cardiac/Vascular  * CAD, multiple vessel  Managed per primary team  Avoid hypoglycemia        Hyperlipidemia    On statin therapy per ADA guidelines.     Endocrine  Type 2 diabetes mellitus  Endocrinology consulted for BG management.   BG goal 140-180     - Transition drip at 0.8 units/hr with step-down parameters.   - Novolog (aspart) insulin prn for BG excursions LDC SSI (150/50).  - BG checks q4hr  - Hypoglycemia protocol in place    ** Please notify Endocrine for any change and/or advance in diet**  ** Please call Endocrine for any BG related issues **    Discharge Planning:   TBD. Please notify endocrinology prior to discharge.               Femi Miranda, DNP, FNP  Endocrinology  [facility]

## 2023-08-31 NOTE — PT/OT/SLP PROGRESS
Physical Therapy      Patient Name:  Jason Barros   MRN:  [id number]    Patient not seen today secondary to: remains intubated and sedated during AM and PM attempts. Will follow-up 9/1.    8/31/2023

## 2023-08-31 NOTE — PLAN OF CARE
See previous notes for significant shift events. 2 units PRBCs given, was previously on Epi, Levo and Vaso gtts - now remains on Epi @ 0.06 mcg/kg/min and Levo @ 0.02 mcg/kg/min. Vaso, dobutamine and milrinone gtts remain off. MAP maintained 60-80    UOP consistently 25-30 mL/hr. 210 mL sanguinous output from both pleural chest tubes and 210 mL sanguinous output from both mediastinal chest tubes. CVP 10-13. Unable to perform complete neuro assessment overnight, but he does raise eyebrows to voice and moves all extremities. AICD turned back on by cardiology around 21:00. NSR 70s-80s on bedside monitor. SpO2 95-98% on current vent settings - 50% FiO2, 5 PEEP, rate 24-26.    Plan of care reviewed with patient and patient's daughter -both staying in [facility], both updated on ICU visiting policy. Plan to wean to extubate today and closely monitor hemodynamics. Bed in low position and brake set. See flowsheets for detailed assessment.

## 2023-08-31 NOTE — NURSING
Notified Dr. Vinson that unable to obtain L DP. Prior assessment, DP was doppler. PT doppler. Warm packs applied to foot. Will re-evaluate.

## 2023-08-31 NOTE — NURSING
Dr. Gastelum and team to bedside. Wean epi as tolerated for CI >2. Orders received.   MD advanced Beulah-Diane to 67 cm. CXR in AM.

## 2023-08-31 NOTE — NURSING
MAPs decreased to 40s. Dr. Guzman called to bedside.    Levo gtt initiated and Epi gtt increased. Milrinone and Dobutamine gtts paused. Vaso gtt ordered.    2 units PRBCs ordered to be transfused.

## 2023-08-31 NOTE — PLAN OF CARE
Recommendations     1.) Recommend if and when medically feasible to ADAT with goal of Cardiac diet.      2.) If PO intake <50% of Boost Glucose Control to help optimize PRO/Kcal intake.      3.) If pt is NPO >48hrs and alternative nutrition is medically warranted, recommend initiating TF of Impact Peptide 1.5 at trickle rate and slowly advance to the following goals:      - if pt is sedated on Propofol, recommend TF of Impact Peptide 1.5@45ml/hr to provide 1620kcal, 101g PRO, and 832ml FF- FWF per MD.      - If pt is off sedation with Propofol, recommend Tf goal of Impact Peptide 1.5@55ml/hr to provide 1980kcal, 124g PRO, and 1016ml FF- FWF per MD.      4.) RD to monitor wt, nutritional status, skin, labs.        Goals: to meet % of EEN/EPN by next RD f/u  Nutrition Goal Status: new  Communication of RD Recs:  (POC)

## 2023-08-31 NOTE — PROGRESS NOTES
Arrhythmia Department    Verified device settings post CABG. Tachy therapies ENABLED.     Pdf uploaded under media tab.

## 2023-08-31 NOTE — SUBJECTIVE & OBJECTIVE
Interval History/Significant Events: Hypotension overnight, started on Levo,. vaso and epi drip increased. Milrinone and dobutamine drips paused.  Intubated, on rate. Plan to extubate today. S/p CABGx2 POD1. Received 2 units overnight for hb 6.5, responded appropriately. Lactate gradually decreased from 4.0 to 1.6 overnight.     Follow-up For: Procedure(s) (LRB):  CABG, REPEAT Redo Sternotomy (N/A)  SURGICAL PROCUREMENT, VEIN, ENDOSCOPIC (Left)    Post-Operative Day: 1 Day Post-Op    Objective:     Vital Signs (Most Recent):  Temp: 100 °F (37.8 °C) (08/31/23 0505)  Pulse: 74 (08/31/23 0505)  Resp: (!) 24 (08/31/23 0505)  BP: 96/62 (08/31/23 0500)  SpO2: 100 % (08/31/23 0505) Vital Signs (24h Range):  Temp:  [95.5 °F (35.3 °C)-100 °F (37.8 °C)] 100 °F (37.8 °C)  Pulse:  [65-83] 74  Resp:  [15-27] 24  SpO2:  [96 %-100 %] 100 %  BP: ()/(41-99) 96/62  Arterial Line BP: ()/(35-65) 68/35     Weight: 85.7 kg (188 lb 15 oz)  Body mass index is 30.49 kg/m².      Intake/Output Summary (Last 24 hours) at 8/31/2023 0556  Last data filed at 8/31/2023 0505  Gross per 24 hour   Intake 3635.2 ml   Output 2695 ml   Net 940.2 ml          Physical Exam  Vitals reviewed.   Constitutional:       General: He is not in acute distress.     Interventions: He is sedated and intubated.   Eyes:      Extraocular Movements: Extraocular movements intact.      Pupils: Pupils are equal, round, and reactive to light.   Neck:      Comments: [facility] CVC  Cardiovascular:      Rate and Rhythm: Normal rate and regular rhythm.      Pulses: Normal pulses.      Comments: AICD     Pulmonary:      Effort: No respiratory distress. He is intubated.   Abdominal:      General: There is no distension.      Palpations: Abdomen is soft.   Musculoskeletal:      Right lower leg: No edema.      Left lower leg: No edema.   Skin:     General: Skin is warm and dry.      Capillary Refill: Capillary refill takes less than 2 seconds.      Coloration: Skin is not  jaundiced.      Comments: MSI clean dry and intact  Chest tubes in place    Neurological:      General: No focal deficit present.            Vents:  Vent Mode: A/C (08/31/23 0323)  Ventilator Initiated: Yes (08/30/23 1630)  Set Rate: 24 BPM (08/31/23 0323)  Vt Set: 420 mL (08/31/23 0323)  Pressure Support: 8 cmH20 (08/30/23 2024)  PEEP/CPAP: 5 cmH20 (08/31/23 0323)  Oxygen Concentration (%): 50 (08/31/23 0505)  Peak Airway Pressure: 24 cmH20 (08/31/23 0323)  Plateau Pressure: 21 cmH20 (08/31/23 0323)  Total Ve: 11.3 L/m (08/31/23 0323)  Negative Inspiratory Force (cm H2O): 0 (08/31/23 0323)  F/VT Ratio<105 (RSBI): (!) 50.85 (08/31/23 0323)    Lines/Drains/Airways       Central Venous Catheter Line  Duration              Introducer with Double Lumen Internal Jugular Right -- days    Pulmonary Artery Catheter Assessment  08/30/23 0832 <1 day              Drain  Duration                  Urethral Catheter 08/30/23 0839 Non-latex;Straight-tip;Temperature probe 14 Fr. <1 day         Y Chest Tube 1 and 2 08/30/23 1504 1 Right Pleural 19 Fr. 2 Left Pleural 19 Fr. <1 day         Y Chest Tube 3 and 4 08/30/23 1505 3 Anterior Mediastinal 19 Fr. 4 Anterior Mediastinal 19 Fr. <1 day              Airway  Duration                  Airway - Non-Surgical 08/30/23 0830 <1 day              Arterial Line  Duration             Arterial Line 08/30/23 0816 Right Other (Comment) <1 day              Line  Duration                  Pacer Wires 08/30/23 1443 <1 day              Peripheral Intravenous Line  Duration                  Peripheral IV - Single Lumen 08/30/23 0723 20 G Right Hand <1 day         Peripheral IV - Single Lumen 08/30/23 0912 16 G Left Wrist <1 day                    Significant Labs:    CBC/Anemia Profile:  Recent Labs   Lab 08/30/23  1636 08/30/23  1639 08/30/23  2212 08/30/23  2306 08/31/23  0204 08/31/23  0342 08/31/23  0343   WBC 15.34*  --  14.03*  --   --   --  13.44*   HGB 9.1*  --  6.5*  --   --   --  9.2*   HCT  29.1*   < > 21.7*   < > 27* 27* 28.5*   *  --  123*  --   --   --  109*   MCV 95  --  98  --   --   --  88   RDW 17.1*  --  17.2*  --   --   --  17.6*    < > = values in this interval not displayed.        Chemistries:  Recent Labs   Lab 08/29/23  0920 08/30/23  1636 08/30/23 1958 08/31/23 0343    141  --  141   K 4.2 4.4 4.5 4.7    111*  --  112*   CO2 26 22*  --  20*   BUN 14 11  --  15   CREATININE 0.9 0.7  --  0.9   CALCIUM 9.3 8.1*  --  7.6*   ALBUMIN 3.6 2.5*  --  3.3*   PROT 7.2 5.0*  --  4.8*   BILITOT 0.6 0.8  --  1.1*   ALKPHOS 104 77  --  53*   ALT 14 20  --  16   AST 16 41*  --  33   MG  --  1.8  1.8  --  1.6   PHOS  --  3.4  3.4  --  4.6*       ABGs:   Recent Labs   Lab 08/31/23 0346   PH 7.379   PCO2 43.2   HCO3 25.5   POCSATURATED 33*   BE 0     Coagulation:   Recent Labs   Lab 08/31/23 0343   INR 1.6*   APTT 29.2       Significant Imaging:  I have reviewed all pertinent imaging results/findings within the past 24 hours.    Lower left lobe consolidation

## 2023-08-31 NOTE — HOSPITAL COURSE
Jason Barros is a 71yo M with a PMHx CAD s/p prior CABG (vein graft to distal RCA), ICM, HFrEF (LVEF 20%), s/p ICD,  HTN, DM type 2. LHC done showed three vessel disease: left main - 90%, Prox LAD-1 - 80%, mid lesion - 60%, Lcx - 90% w/ a widely patent RCA graft. TTE from 7/25 showed a severely reduced LVEF  of 25% and moderately to severely reduced right ventricular systolic function.     The patient presents to the SICU s/p redo CABGx2 with Dr. Garvey on 08/30/2023. On admission, they are intubated, sedated with propofol, and in stable condition. Inotropic and vasopressor requirements upon admission are .02 mcg/kg/min of epinephrine and .375mcg/kg/min of milrinone to maintain blood pressure at a MAP 60-80 and SBP < 140. Central access includes a RIJ CVC, arterial access includes a right radial arterial line. They also have 2 pleural and 2 mediastinal chest tubes with appropriate output.

## 2023-08-31 NOTE — NURSING
Nurses Note -- 4 Eyes      8/31/2023   03:45AM      Skin assessed during: Admit      [x] No Altered Skin Integrity Present    [x]Prevention Measures Documented      [] Yes- Altered Skin Integrity Present or Discovered   [] LDA Added if Not in Epic (Describe Wound)   [] New Altered Skin Integrity was Present on Admit and Documented in LDA   [] Wound Image Taken    Wound Care Consulted? No    Attending Nurse:  Shadia Powell RN    Second RN/Staff Member:  Ashok Regalado RN    4 eyes assessment unable to be completed on initial admission to SICU on 8/30 around 1645 2/2 patient's hemodynamic instability. Patient stabilized overnight and 4 eyes skin assessment able to be completed.

## 2023-08-31 NOTE — PLAN OF CARE
[facility]  Initial Discharge Assessment       Primary Care Provider: Kingsley Painting Jr., MD    Admission Diagnosis: CAD, multiple vessel [I25.10]    Admission Date: 8/30/2023  Expected Discharge Date:     Transition of Care Barriers: None    Payor: MEDICARE / Plan: MEDICARE PART A & B / Product Type: Government /     Extended Emergency Contact Information  Primary Emergency Contact: justine story  Mobile Phone: [phone]  Relation: Spouse  Preferred language: English   needed? No  Secondary Emergency Contact: severiano story  Mobile Phone: [phone]  Relation: Son  Preferred language: English   needed? No    Discharge Plan A: Home Health, Home with family, Home  Discharge Plan B: Home with family, Home      [facility] - [place], [place] - [address]  [address]  [place] [address]  Phone: [phone] Fax: [phone]      Initial Assessment (most recent)       Adult Discharge Assessment - 08/31/23 1046          Discharge Assessment    Assessment Type Discharge Planning Assessment     Confirmed/corrected address, phone number and insurance Yes     Confirmed Demographics Correct on Facesheet     Source of Information family     When was your last doctors appointment? 08/15/23     Communicated [place] with patient/caregiver Date not available/Unable to determine     People in Home grandchild(annelise);spouse     Do you expect to return to your current living situation? Yes     Do you have help at home or someone to help you manage your care at home? Yes     Prior to hospitilization cognitive status: No Deficits     Current cognitive status: Coma/Sedated/Intubated     Walking or Climbing Stairs ambulation difficulty, requires equipment     Mobility Management cane     Home Layout Able to live on 1st floor     Equipment Currently Used at Home cane, straight     Readmission within 30 days? Yes     Patient currently being followed by  outpatient case management? No     Do you currently have service(s) that help you manage your care at home? No     Do you take prescription medications? Yes     Do you have prescription coverage? No     Is the patient taking medications as prescribed? yes     Who is going to help you get home at discharge? fAMILY     How do you get to doctors appointments? car, drives self;family or friend will provide     Are you on dialysis? No     Do you take coumadin? No     DME Needed Upon Discharge  cane, straight     Discharge Plan discussed with: Spouse/sig other;Adult children     Transition of Care Barriers None     Discharge Plan A Home Health;Home with family;Home     Discharge Plan B Home with family;Home        Physical Activity    On average, how many days per week do you engage in moderate to strenuous exercise (like a brisk walk)? 0 days     On average, how many minutes do you engage in exercise at this level? 0 min        Financial Resource Strain    How hard is it for you to pay for the very basics like food, housing, medical care, and heating? Not very hard        Housing Stability    In the last 12 months, was there a time when you were not able to pay the mortgage or rent on time? Patient refused     In the last 12 months, was there a time when you did not have a steady place to sleep or slept in a shelter (including now)? Patient refused        Transportation Needs    In the past 12 months, has lack of transportation kept you from medical appointments or from getting medications? Patient refused     In the past 12 months, has lack of transportation kept you from meetings, work, or from getting things needed for daily living? Patient refused        Food Insecurity    Within the past 12 months, you worried that your food would run out before you got the money to buy more. Patient refused     Within the past 12 months, the food you bought just didn't last and you didn't have money to get more. Patient refused         Stress    Do you feel stress - tense, restless, nervous, or anxious, or unable to sleep at night because your mind is troubled all the time - these days? Patient refused        Social Connections    In a typical week, how many times do you talk on the phone with family, friends, or neighbors? Patient refused     How often do you get together with friends or relatives? Patient refused     How often do you attend Shinto or Holiness services? Patient refused     How often do you attend meetings of the clubs or organizations you belong to? Patient refused     Are you , , , , never , or living with a partner? Patient refused        Alcohol Use    Q1: How often do you have a drink containing alcohol? Patient refused     Q2: How many drinks containing alcohol do you have on a typical day when you are drinking? Patient refused     Q3: How often do you have six or more drinks on one occasion? Patient refused

## 2023-09-01 PROBLEM — I73.9 PVD (PERIPHERAL VASCULAR DISEASE): Status: ACTIVE | Noted: 2023-01-01

## 2023-09-01 NOTE — EICU
Intervention Initiated From:  [facility] / EICU    Bunny intervened regarding:  ECG Change and HR    Comments:   Called in to room at 1110 via eLert, pt in unstable AFib RVR w/ frequent PVC's. Dr Nirav Mathias at bedside w/ ICU RN and RT. Pt hypotensive w/ SBP 50's-70's. Epi gtt increasing and Rock IVP given per Dr Nirav Mathias at bedside. Amio 150 mg IV bolus given x2. Pt remains in unstable Afib RVR. Decision made to cardiovert.   1117: Propofol 40 mg IVP given x1 per MD  1118: 150 J synchronized shock given x1; pt converted to sinus but then went back into AFib RVR  1120: 200 J synchronized shock given x1; pt converted to NSR in 70s; Rock IVP given per MD for MAP's in 50's.  1125: pt remains in NSR but hypotensive w/ MAP's in 50's; Levo and Vaso gtt started; Rock IVP given per MD  1130: EP at bedside; BP improved w/ MAP in 70's. Bedside ECHO ordered. Labs pending to monitor electrolytes.

## 2023-09-01 NOTE — PLAN OF CARE
Afebrile. Switched from epi to milrinone. Became hypotensive when milrinone increased. Epi back to 0.04 mcg/kg/min and milrinone d/c'd. Attempted SBT 3x today. Poor NIF effort and low VC. Unable to extubate despite sedation being off since early in shift. Follows commands. Plan to minimize sedation and attempt extubation in AM. Loss of DP pulse this afternoon. Arterial U/S pending and vascular consult in place. L PT pulse doppler. Swengel advanced. Adequate UOP.     Family at bedside and updated throughout shift. Questions/concerns addressed.

## 2023-09-01 NOTE — PROCEDURES
"Jason Barros is a 72 y.o. male patient.    Temp: 99.3 °F (37.4 °C) (09/01/23 1645)  Pulse: 62 (09/01/23 1715)  Resp: (!) 25 (09/01/23 1715)  BP: (!) 90/55 (09/01/23 1700)  SpO2: 99 % (09/01/23 1715)  Weight: 93.5 kg (206 lb 2.1 oz) (09/01/23 1418)  Height: 5' 6" (167.6 cm) (09/01/23 1418)       Electrical Cardioversion    Date/Time: 9/1/2023 11:18 AM  Location procedure was performed: [facility]    Performed by: Marco A Wetzel MD  Authorized by: Marco A Wetzel MD    Patient sedated: yes  Sedation type: deep sedation    Sedatives: propofol  Cardioversion basis: emergent  Pre-procedure rhythm: atrial fibrillation  Patient position: patient was placed in a supine position  Chest area: chest area exposed  Electrodes: pads  Electrodes placed: anterior-posterior  Number of attempts: 2  Attempt 1 mode: synchronous  Attempt 1 shock (in Joules): 150  Attempt 1 outcome: no change in rhythm  Attempt 2 shock (in Joules): 200  Attempt 2 outcome: conversion to normal sinus rhythm  Complications: no complications  Complications: No  Specimens: No  Implants: No          9/1/2023    "

## 2023-09-01 NOTE — PLAN OF CARE
Patient sucessfully extubated to comfort flow this morning. At approx 11am, pt EKG noted to show afib rvr with HR ranging 130s-180s. Pt also had increasing pressor requirements and went from Epi .04 to also requiring .04 Vaso. Administered 2x 150mg Amio bolus with no improvement. With attending at bedside, pt was deemed appropriate for synchronized cardioversion with sedation of 40mg Propofol. Administered synchronized cardioversion of 150 J at 11:18am. Pt rhythm very briefly improved to normal sinus before converting to vtach. Administered second round of synchronized cardioversion at 200J at 11:20 with conversion to normal sinus rhythm with rate in 80-90s. Pt received intermittent support of Phenylephrine boluses throughout procedure for total of 1600mcg. Third pressor of Levo added with MAP goal >60 to be weaned as tolerated.     Post cardioversion pt remained in normal sinus rhythm with further weaning of pressor support as needed. Pt remains on comfort flow. ABG at bedside within normal limits. Rest of care per CTS team.

## 2023-09-01 NOTE — PROGRESS NOTES
[facility]  Vascular Surgery  Progress Note    Patient Name: Jason Barros  MRN: [id number]  Admission Date: 8/30/2023  Primary Care Provider: Kingsley Painting Jr., MD    Subjective:     Interval History:  Ultrasound performed at bedside with concern for SFA occlusion.  Patient evaluated, still intubated however able to respond to questions appropriately. no complaints of foot pain, no sensory or motor loss.    Post-Op Info:  Procedure(s) (LRB):  CABG, REPEAT Redo Sternotomy (N/A)  SURGICAL PROCUREMENT, VEIN, ENDOSCOPIC (Left)   2 Days Post-Op      Medications:  Continuous Infusions:   sodium chloride 0.9% 5 mL/hr at 09/01/23 1500    amiodarone in dextrose 5% 1 mg/min (09/01/23 1500)    amiodarone in dextrose 5%      EPINEPHrine 0.06 mcg/kg/min (09/01/23 1500)    NORepinephrine bitartrate-D5W 0.06 mcg/kg/min (09/01/23 1251)    vasopressin 0.04 Units/min (09/01/23 1524)     Scheduled Meds:   [START ON 9/2/2023] acetaminophen  1,000 mg Oral Q8H    albumin human 5%  12.5 g Intravenous Once    aspirin  81 mg Oral Daily    [START ON 9/2/2023] atorvastatin  80 mg Oral Daily    [START ON 9/2/2023] clopidogreL  75 mg Oral Daily    docusate sodium  100 mg Oral BID    gabapentin  400 mg Per NG tube Q8H    methocarbamoL  500 mg Oral QID    mupirocin  1 g Nasal BID    [START ON 9/2/2023] pantoprazole  40 mg Oral Daily    polyethylene glycol  17 g Oral Daily    propofol  40 mg Intravenous Once    tamsulosin  0.4 mg Oral Daily     PRN Meds:0.9%  NaCl infusion (for blood administration), albuterol sulfate, bisacodyL, dextrose 10%, dextrose 10%, glucagon (human recombinant), HYDROmorphone, insulin aspart U-100, magnesium sulfate IVPB, magnesium sulfate IVPB, metoclopramide HCl, ondansetron, oxyCODONE, oxyCODONE, potassium chloride in water **AND** potassium chloride in water **AND** potassium chloride in water, sodium chloride 0.9%, sodium phosphate 15 mmol in dextrose 5 % (D5W) 250 mL IVPB, sodium  phosphate 20.01 mmol in dextrose 5 % (D5W) 250 mL IVPB, sodium phosphate 30 mmol in dextrose 5 % (D5W) 250 mL IVPB     Objective:     Vital Signs (Most Recent):  Temp: 100 °F (37.8 °C) (09/01/23 1430)  Pulse: 62 (09/01/23 1430)  Resp: (!) 23 (09/01/23 1430)  BP: (!) 103/44 (09/01/23 1418)  SpO2: 100 % (09/01/23 1430) Vital Signs (24h Range):  Temp:  [99.3 °F (37.4 °C)-100.6 °F (38.1 °C)] 100 °F (37.8 °C)  Pulse:  [] 62  Resp:  [14-32] 23  SpO2:  [88 %-100 %] 100 %  BP: ()/(44-68) 103/44  Arterial Line BP: ()/(40-65) 93/46     Date 09/01/23 0700 - 09/02/23 0659   Shift 2127-1307 2239-0860 0041-7037 24 Hour Total   INTAKE   I.V.(mL/kg) 200.7(2.1) 53.5(0.6)  254.2(2.7)   IV Piggyback 343.2   343.2   Shift Total(mL/kg) 543.9(5.8) 53.5(0.6)  597.4(6.4)   OUTPUT   Urine(mL/kg/hr) 555(0.7) 40  595   Chest Tube 30   30   Shift Total(mL/kg) 585(6.3) 40(0.4)  625(6.7)   Weight (kg) 93.5 93.5 93.5 93.5       Physical Exam  Constitutional:       Interventions: He is intubated.   HENT:      Head: Normocephalic.   Eyes:      Pupils: Pupils are equal, round, and reactive to light.   Cardiovascular:      Rate and Rhythm: Normal rate.      Comments: R and L fem biphasic   R and L pop biphasic  R and L PT biphasic, no DP signals appreciated.   Nods no to pain able to wiggle toes and plantar and dorsiflex with 5/5 strength, no sensory loss  Pulmonary:      Effort: He is intubated.   Musculoskeletal:      Right lower leg: No edema.      Left lower leg: No edema.   Neurological:      Mental Status: He is alert.         Significant Labs:  All pertinent labs from the last 24 hours have been reviewed.    Significant Diagnostics:  I have reviewed all pertinent imaging results/findings within the past 24 hours.    Assessment/Plan:     Active Diagnoses:    Diagnosis Date Noted POA    PRINCIPAL PROBLEM:  CAD, multiple vessel [I25.10] 07/29/2023 Yes    PVD (peripheral vascular disease) [I73.9] 09/01/2023 Unknown    Acute on  chronic combined systolic and diastolic heart failure [I50.43] 07/29/2023 Yes    Hyperlipidemia [E78.5] 07/29/2023 Yes    ICD (implantable cardioverter-defibrillator) in place [Z95.810] 07/29/2023 Yes    Type 2 diabetes mellitus [E11.9] 07/29/2023 Yes      Problems Resolved During this Admission:     72M POD#2 s/p CABG with incidental finding of lost L DP pulse and arterial duplex with finding of occluded L SFA.     - No vascular intervention at this time, patient asymptomatic without foot pain, numbness, or swelling, presence of biphasic L PT signal.   - Recommend continuing DAPT (currently on)  - Please reach out if clinical status changes or any questions or concerns.       Jerson Lindsey MD  Vascular Surgery  [facility]

## 2023-09-01 NOTE — SUBJECTIVE & OBJECTIVE
Interval History/Significant Events: No overnight events. Received 1500ml albumin. Hgb trending down, now 7.9. NIF -13 overnight so remained intubated. Vascular surgery consulted for concern for LLE non dopplerable Dps. Low concern for acute limb ischemia. LE ultrasound ordered.      S/p CABG POD 2. Remains on 0.02 Epinephrine. Remains intubated on Pressure support 50%/5 with RSBI 37. Plan to extubate today, NIF -19, RSBI -40. Lactate remains stable at 1.1. Patient not on any sedation. Responds appropriately, denies pain, HDS.     Follow-up For: Procedure(s) (LRB):  CABG, REPEAT Redo Sternotomy (N/A)  SURGICAL PROCUREMENT, VEIN, ENDOSCOPIC (Left)    Post-Operative Day: 1 Day Post-Op    Objective:     Vital Signs (Most Recent):  Temp: 99.7 °F (37.6 °C) (09/01/23 0700)  Pulse: 70 (09/01/23 0700)  Resp: 17 (09/01/23 0700)  BP: (!) 97/56 (09/01/23 0600)  SpO2: 96 % (09/01/23 0700) Vital Signs (24h Range):  Temp:  [99.3 °F (37.4 °C)-100.8 °F (38.2 °C)] 99.7 °F (37.6 °C)  Pulse:  [62-85] 70  Resp:  [14-31] 17  SpO2:  [87 %-100 %] 96 %  BP: ()/(47-67) 97/56  Arterial Line BP: ()/(42-66) 102/44     Weight: 93.5 kg (206 lb 2.1 oz)  Body mass index is 33.27 kg/m².      Intake/Output Summary (Last 24 hours) at 9/1/2023 0713  Last data filed at 9/1/2023 0600  Gross per 24 hour   Intake 880.49 ml   Output 1235 ml   Net -354.51 ml            Physical Exam  Vitals reviewed.   Constitutional:       General: He is not in acute distress.     Interventions: He is sedated and intubated.   Eyes:      Extraocular Movements: Extraocular movements intact.      Pupils: Pupils are equal, round, and reactive to light.   Neck:      Comments: [facility] CVC  Cardiovascular:      Rate and Rhythm: Normal rate and regular rhythm.      Pulses: Normal pulses.      Comments: AICD   LE Dopplerable PT pulses, non dopplerable DP. Extremity warm and nontender.   Pulmonary:      Effort: No respiratory distress. He is intubated.   Abdominal:       General: There is no distension.      Palpations: Abdomen is soft.   Musculoskeletal:      Right lower leg: No edema.      Left lower leg: No edema.   Skin:     General: Skin is warm and dry.      Capillary Refill: Capillary refill takes less than 2 seconds.      Coloration: Skin is not jaundiced.      Comments: MSI clean dry and intact  Chest tubes in place    Neurological:      General: No focal deficit present.            Vents:  Vent Mode: Spont (09/01/23 0328)  Ventilator Initiated: Yes (08/30/23 1630)  Set Rate: 24 BPM (08/31/23 1935)  Vt Set: 420 mL (08/31/23 0323)  Pressure Support: 8 cmH20 (09/01/23 0328)  PEEP/CPAP: 5 cmH20 (09/01/23 0328)  Oxygen Concentration (%): 50 (09/01/23 0700)  Peak Airway Pressure: 13 cmH20 (09/01/23 0328)  Plateau Pressure: 21 cmH20 (09/01/23 0328)  Total Ve: 9.09 L/m (09/01/23 0328)  Negative Inspiratory Force (cm H2O): -11 (09/01/23 0328)  F/VT Ratio<105 (RSBI): (!) 37.7 (09/01/23 0328)    Lines/Drains/Airways       Central Venous Catheter Line  Duration              Introducer with Double Lumen 08/30/23 0832 Internal Jugular Right 1 day    Pulmonary Artery Catheter Assessment  08/30/23 0832 1 day              Drain  Duration                  NG/OG Tube 08/30/23 Center mouth 2 days         Urethral Catheter 08/30/23 0839 Non-latex;Straight-tip;Temperature probe 14 Fr. 1 day         Y Chest Tube 1 and 2 08/30/23 1504 1 Right Pleural 19 Fr. 2 Left Pleural 19 Fr. 1 day         Y Chest Tube 3 and 4 08/30/23 1505 3 Anterior Mediastinal 19 Fr. 4 Anterior Mediastinal 19 Fr. 1 day              Airway  Duration                  Airway - Non-Surgical 08/30/23 0830 1 day              Arterial Line  Duration             Arterial Line 08/30/23 0816 Right Other (Comment) 1 day              Line  Duration                  Pacer Wires 08/30/23 1443 1 day              Peripheral Intravenous Line  Duration                  Peripheral IV - Single Lumen 08/30/23 0723 20 G Right Hand 1 day          Peripheral IV - Single Lumen 08/30/23 0912 16 G Left Wrist 1 day                    Significant Labs:    CBC/Anemia Profile:  Recent Labs   Lab 08/30/23  2212 08/30/23  2306 08/31/23 0343 08/31/23  0738 08/31/23 2340 09/01/23 0331 09/01/23 0332   WBC 14.03*  --  13.44*  --   --   --  11.03   HGB 6.5*  --  9.2*  --   --   --  7.9*   HCT 21.7*   < > 28.5*   < > 23* 24* 24.5*   *  --  109*  --   --   --  102*   MCV 98  --  88  --   --   --  90   RDW 17.2*  --  17.6*  --   --   --  17.7*    < > = values in this interval not displayed.          Chemistries:  Recent Labs   Lab 08/30/23  1636 08/30/23 1958 08/31/23 0343 08/31/23 0926 08/31/23 2016 09/01/23 0332     --  141  --   --  139   K 4.4   < > 4.7 4.4 4.1 4.1   *  --  112*  --   --  108   CO2 22*  --  20*  --   --  22*   BUN 11  --  15  --   --  17   CREATININE 0.7  --  0.9  --   --  0.8   CALCIUM 8.1*  --  7.6*  --   --  8.2*   ALBUMIN 2.5*  --  3.3*  --   --  3.4*   PROT 5.0*  --  4.8*  --   --  5.5*   BILITOT 0.8  --  1.1*  --   --  1.2*   ALKPHOS 77  --  53*  --   --  58   ALT 20  --  16  --   --  6*   AST 41*  --  33  --   --  21   MG 1.8  1.8  --  1.6  --   --  1.9   PHOS 3.4  3.4  --  4.6*  --   --  3.1    < > = values in this interval not displayed.         ABGs:   Recent Labs   Lab 09/01/23  0331   PH 7.401   PCO2 38.4   HCO3 23.9*   POCSATURATED 97   BE -1       Coagulation:   Recent Labs   Lab 09/01/23  0332   INR 1.6*   APTT 31.4         Significant Imaging:  I have reviewed all pertinent imaging results/findings within the past 24 hours.    Lower left lobe consolidation

## 2023-09-01 NOTE — SUBJECTIVE & OBJECTIVE
"Interval HPI:   Overnight events: NAEON. Remains in SICU. POD 2. BG well controlled with prn SQ insulin correction scale. Diet NPO    Eating:   NPO  Nausea: No  Hypoglycemia and intervention: No  Fever: No  TPN and/or TF: No  If yes, type of TF/TPN and rate: n/a    /62   Pulse 69   Temp 99.7 °F (37.6 °C)   Resp (!) 21   Ht 5' 6" (1.676 m)   Wt 93.5 kg (206 lb 2.1 oz)   SpO2 (!) 93%   BMI 33.27 kg/m²     Labs Reviewed and Include    Recent Labs   Lab 09/01/23  0332 09/01/23  0832   *  --    CALCIUM 8.2*  --    ALBUMIN 3.4*  --    PROT 5.5*  --      --    K 4.1 3.9   CO2 22*  --      --    BUN 17  --    CREATININE 0.8  --    ALKPHOS 58  --    ALT 6*  --    AST 21  --    BILITOT 1.2*  --      Lab Results   Component Value Date    WBC 11.03 09/01/2023    HGB 7.9 (L) 09/01/2023    HCT 22 (L) 09/01/2023    MCV 90 09/01/2023     (L) 09/01/2023     No results for input(s): "TSH", "FREET4" in the last 168 hours.  Lab Results   Component Value Date    HGBA1C 5.5 08/29/2023       Nutritional status:   Body mass index is 33.27 kg/m².  Lab Results   Component Value Date    ALBUMIN 3.4 (L) 09/01/2023    ALBUMIN 3.3 (L) 08/31/2023    ALBUMIN 2.5 (L) 08/30/2023     No results found for: "PREALBUMIN"    Estimated Creatinine Clearance: 90.7 mL/min (based on SCr of 0.8 mg/dL).    Accu-Checks  Recent Labs     08/31/23  0720 08/31/23  0809 08/31/23  0925 08/31/23  1028 08/31/23  1116 08/31/23  1506 08/31/23  1931 09/01/23  0005 09/01/23  0330 09/01/23  0832   POCTGLUCOSE 108 103 123* 126* 122* 118* 126* 138* 153* 130*       Current Medications and/or Treatments Impacting Glycemic Control  Immunotherapy:    Immunosuppressants       None          Steroids:   Hormones (From admission, onward)      None          Pressors:    Autonomic Drugs (From admission, onward)      Start     Stop Route Frequency Ordered    08/30/23 1615  EPINEPHrine 5 mg in dextrose 5% 250 mL infusion (premix)        Question " Answer Comment   Begin at (in mcg/kg/min): 0.02    Titrate by: (in mcg/kg/min) 0.02    Titrate interval: (in minutes) 5    Titrate to maintain: (SBP or MAP or Cardiac Index) MAP    Greater than: (in mmHg) 65    Cardiac index greater than: (in L/min) 2.2    Maximum dose: (in mcg/kg/min) 2        -- IV Continuous 08/30/23 1604          Hyperglycemia/Diabetes Medications:   Antihyperglycemics (From admission, onward)      Start     Stop Route Frequency Ordered    08/31/23 1621  insulin aspart U-100 pen 0-10 Units         -- SubQ Every 4 hours PRN 08/31/23 1522

## 2023-09-01 NOTE — PT/OT/SLP PROGRESS
Speech Language Pathology      Jason Barros  MRN: [id number]    Orders received and chart reviewed/ SLP to bedside and per discussion with RN pt not medically appropriate for PO trials at this time. Will follow-up as medically appropriate 09/02/23 .    Gladys Crump MS, CCC-SLP  Speech Language Pathologist  Pager: [phone]  Date 9/1/2023

## 2023-09-01 NOTE — ASSESSMENT & PLAN NOTE
72-year-old gentleman postop day 1 status post CABG who presents with change in lower extremity pulse exam.    - Low concern for acute limb ischemia given equal bilateral examination, no complaints of foot pain, no motor or sensory deficits appreciated.  - likely a component of chronic peripheral vascular disease given lack of femoral pulses.  - agree with obtaining arterial duplex for initial workup, can consider FUNMILAYO PVRs for additional workup once more stable from postoperative state.

## 2023-09-01 NOTE — PROGRESS NOTES
[facility] - [facility] Intensive [facility]  Critical Care - Surgery  Progress Note    Patient Name: Jason Barros  MRN: [id number]  Admission Date: 8/30/2023  Hospital Length of Stay: 2 days  Code Status: Full Code  Attending Provider: Lazaro Garvey MD  Primary Care Provider: Kingsley Painting Jr., MD   Principal Problem: CAD, multiple vessel    Subjective:     Hospital/ICU Course:  Jason Barros is a 73yo M with a PMHx CAD s/p prior CABG (vein graft to distal RCA), ICM, HFrEF (LVEF 20%), s/p ICD,  HTN, DM type 2. LHC done showed three vessel disease: left main - 90%, Prox LAD-1 - 80%, mid lesion - 60%, Lcx - 90% w/ a widely patent RCA graft. TTE from 7/25 showed a severely reduced LVEF  of 25% and moderately to severely reduced right ventricular systolic function.     The patient presents to the SICU s/p redo CABGx2 with Dr. Garvey on 08/30/2023. On admission, they are intubated, sedated with propofol, and in stable condition. Inotropic and vasopressor requirements upon admission are .02 mcg/kg/min of epinephrine and .375mcg/kg/min of milrinone to maintain blood pressure at a MAP 60-80 and SBP < 140. Central access includes a RIJ CVC, arterial access includes a right radial arterial line. They also have 2 pleural and 2 mediastinal chest tubes with appropriate output.      Interval History/Significant Events: No overnight events. Received 1500ml albumin. Hgb trending down, now 7.9. NIF -13 overnight so remained intubated. Vascular surgery consulted for concern for LLE non dopplerable Dps. Low concern for acute limb ischemia. LE ultrasound ordered.      S/p CABG POD 2. Remains on 0.02 Epinephrine. Remains intubated on Pressure support 50%/5 with RSBI 37. Plan to extubate today, NIF -19, RSBI -40. Lactate remains stable at 1.1. Patient not on any sedation. Responds appropriately, denies pain, HDS.     Follow-up For: Procedure(s) (LRB):  CABG, REPEAT Redo Sternotomy (N/A)  SURGICAL PROCUREMENT, VEIN, ENDOSCOPIC  (Left)    Post-Operative Day: 1 Day Post-Op    Objective:     Vital Signs (Most Recent):  Temp: 99.7 °F (37.6 °C) (09/01/23 0700)  Pulse: 70 (09/01/23 0700)  Resp: 17 (09/01/23 0700)  BP: (!) 97/56 (09/01/23 0600)  SpO2: 96 % (09/01/23 0700) Vital Signs (24h Range):  Temp:  [99.3 °F (37.4 °C)-100.8 °F (38.2 °C)] 99.7 °F (37.6 °C)  Pulse:  [62-85] 70  Resp:  [14-31] 17  SpO2:  [87 %-100 %] 96 %  BP: ()/(47-67) 97/56  Arterial Line BP: ()/(42-66) 102/44     Weight: 93.5 kg (206 lb 2.1 oz)  Body mass index is 33.27 kg/m².      Intake/Output Summary (Last 24 hours) at 9/1/2023 0713  Last data filed at 9/1/2023 0600  Gross per 24 hour   Intake 880.49 ml   Output 1235 ml   Net -354.51 ml            Physical Exam  Vitals reviewed.   Constitutional:       General: He is not in acute distress.     Interventions: He is sedated and intubated.   Eyes:      Extraocular Movements: Extraocular movements intact.      Pupils: Pupils are equal, round, and reactive to light.   Neck:      Comments: [facility] CVC  Cardiovascular:      Rate and Rhythm: Normal rate and regular rhythm.      Pulses: Normal pulses.      Comments: AICD   LE Dopplerable PT pulses, non dopplerable DP. Extremity warm and nontender.   Pulmonary:      Effort: No respiratory distress. He is intubated.   Abdominal:      General: There is no distension.      Palpations: Abdomen is soft.   Musculoskeletal:      Right lower leg: No edema.      Left lower leg: No edema.   Skin:     General: Skin is warm and dry.      Capillary Refill: Capillary refill takes less than 2 seconds.      Coloration: Skin is not jaundiced.      Comments: MSI clean dry and intact  Chest tubes in place    Neurological:      General: No focal deficit present.            Vents:  Vent Mode: Spont (09/01/23 0328)  Ventilator Initiated: Yes (08/30/23 1630)  Set Rate: 24 BPM (08/31/23 1935)  Vt Set: 420 mL (08/31/23 0323)  Pressure Support: 8 cmH20 (09/01/23 0328)  PEEP/CPAP: 5 cmH20 (09/01/23  0328)  Oxygen Concentration (%): 50 (09/01/23 0700)  Peak Airway Pressure: 13 cmH20 (09/01/23 0328)  Plateau Pressure: 21 cmH20 (09/01/23 0328)  Total Ve: 9.09 L/m (09/01/23 0328)  Negative Inspiratory Force (cm H2O): -11 (09/01/23 0328)  F/VT Ratio<105 (RSBI): (!) 37.7 (09/01/23 0328)    Lines/Drains/Airways       Central Venous Catheter Line  Duration              Introducer with Double Lumen 08/30/23 0832 Internal Jugular Right 1 day    Pulmonary Artery Catheter Assessment  08/30/23 0832 1 day              Drain  Duration                  NG/OG Tube 08/30/23 Center mouth 2 days         Urethral Catheter 08/30/23 0839 Non-latex;Straight-tip;Temperature probe 14 Fr. 1 day         Y Chest Tube 1 and 2 08/30/23 1504 1 Right Pleural 19 Fr. 2 Left Pleural 19 Fr. 1 day         Y Chest Tube 3 and 4 08/30/23 1505 3 Anterior Mediastinal 19 Fr. 4 Anterior Mediastinal 19 Fr. 1 day              Airway  Duration                  Airway - Non-Surgical 08/30/23 0830 1 day              Arterial Line  Duration             Arterial Line 08/30/23 0816 Right Other (Comment) 1 day              Line  Duration                  Pacer Wires 08/30/23 1443 1 day              Peripheral Intravenous Line  Duration                  Peripheral IV - Single Lumen 08/30/23 0723 20 G Right Hand 1 day         Peripheral IV - Single Lumen 08/30/23 0912 16 G Left Wrist 1 day                    Significant Labs:    CBC/Anemia Profile:  Recent Labs   Lab 08/30/23  2212 08/30/23  2306 08/31/23  0343 08/31/23  0738 08/31/23  2340 09/01/23  0331 09/01/23  0332   WBC 14.03*  --  13.44*  --   --   --  11.03   HGB 6.5*  --  9.2*  --   --   --  7.9*   HCT 21.7*   < > 28.5*   < > 23* 24* 24.5*   *  --  109*  --   --   --  102*   MCV 98  --  88  --   --   --  90   RDW 17.2*  --  17.6*  --   --   --  17.7*    < > = values in this interval not displayed.          Chemistries:  Recent Labs   Lab 08/30/23  1636 08/30/23  1958 08/31/23  0343 08/31/23  0913  08/31/23 2016 09/01/23 0332     --  141  --   --  139   K 4.4   < > 4.7 4.4 4.1 4.1   *  --  112*  --   --  108   CO2 22*  --  20*  --   --  22*   BUN 11  --  15  --   --  17   CREATININE 0.7  --  0.9  --   --  0.8   CALCIUM 8.1*  --  7.6*  --   --  8.2*   ALBUMIN 2.5*  --  3.3*  --   --  3.4*   PROT 5.0*  --  4.8*  --   --  5.5*   BILITOT 0.8  --  1.1*  --   --  1.2*   ALKPHOS 77  --  53*  --   --  58   ALT 20  --  16  --   --  6*   AST 41*  --  33  --   --  21   MG 1.8  1.8  --  1.6  --   --  1.9   PHOS 3.4  3.4  --  4.6*  --   --  3.1    < > = values in this interval not displayed.         ABGs:   Recent Labs   Lab 09/01/23  0331   PH 7.401   PCO2 38.4   HCO3 23.9*   POCSATURATED 97   BE -1       Coagulation:   Recent Labs   Lab 09/01/23  0332   INR 1.6*   APTT 31.4         Significant Imaging:  I have reviewed all pertinent imaging results/findings within the past 24 hours.    Lower left lobe consolidation      Assessment/Plan:     Cardiac/Vascular  * CAD, multiple vessel    Neuro/Psych:     - Sedation: none    - Pain:    - Scheduled Tylenol 1g q8h, robaxin   - Oxy 5, 10 PRN hydromorphone for breakthrough,  - gabapentin home med             Cardiac:     - S/P CABGx with Dr. Garvey on 8/30/2023    - BP Goal: MAP 60-80, SBP < 140    - Pressors: Epi, wean as able    - Anti-HTNs: Will start when appropriate    - Rhythm: NSR, AICD    - Beta blocker: Will start when appropriate, takes Toprol 25 daily at  home    - Statin: Atorvastatin 40 mg QD  - Discontinue warner today.   - Vascular surgery consulted. Low concern for acute limb ischemia of LLE. LE ultrasound showed partially occlusive thrombus in prox SFA  w complete occlusive thrombus in mid & distal SFA.   - consider FUNMILAYO PVRs for additional workup once more stable post op      Pulmonary:     - Goal SpO2 >92%    - Will wean ventilator support as tolerated to extubate   - Chest Tubes x 4 (2 Meds & 2 Pleural), 210cc, 210cc    - ABGs PRN    -  Albuterol nebs q4    - SBT, parameters to extubate today to comfort flow  - Monitor closely as patient is at risk for re-intubation  - Encourage incentive spirometry, schedule nebulizer treatments q4, chest physiotherapy BID      Renal:    - Trend BUN/Cr     - Maintain Saenz, record strict Is/Os    - 720cc UOP/ 24hr, Net -400cc, 25-30cc/hr UOP   - Goal > 0.5 cc/kg/hour  - Uptrending CVPs 12,14,17  - Trial Lasix 20mg per CT Surg  Recent Labs   Lab 08/30/23  1636 08/31/23  0343 09/01/23  0332   BUN 11 15 17   CREATININE 0.7 0.9 0.8         FEN / GI:     - Daily CMP, PRN K/Mag/Phos per protocol     - Replace electrolytes as needed    - Nutrition: NPO pending extubation    - Bowel Regimen: Miralax, docusate    -  protonix at home, 40 mg daily, restart, dc famotidine      ID:     - Afebrile this morning, tmax 100.8 /24hr    - WBC trending down 11     - Abx: Complete perioperative cefazolin 2g Q8H x 5 doses    - No concerns for infection at this time    Recent Labs   Lab 08/30/23  2212 08/31/23  0343 09/01/23  0332   WBC 14.03* 13.44* 11.03         Heme/Onc:     - Hgb 11.8 pre-operatively    - CBC daily    - ASA 325mg    - Cont plavix 75mg  per CTS    Recent Labs   Lab 08/30/23  2212 08/30/23  2303 08/31/23  0343 09/01/23  0332   HGB 6.5*  --  9.2* 7.9*   *  --  109* 102*   APTT  --  30.5 29.2 31.4   INR  --  1.7* 1.6* 1.6*         Endocrine:     - CTS Goal -140, controlled with SSI    - Insulin gtt now discontinued     - HgbA1c: 5.5    - Endocrinology consulted for insulin management      PPx:   Feeding: npo  Analgesia/Sedation: multimodal  Thromboembolic Prevention: SCDs  HOB >30: Yes  Stress Ulcer: pepcid  Glucose Control: Yes, insulin management per Endocrinology     Lines/Drains/Airway:   ETT   Right radial arterial line   RIJ CVC   Saenz   Chest Tubes:4   Pacing Wires: Temporary ventricular pacing wires      Dispo/Code Status/Palliative:     - Continue SICU Care    - Full Code           Critical  care was time spent personally by me on the following activities: development of treatment plan with patient or surrogate and bedside caregivers, discussions with consultants, evaluation of patient's response to treatment, examination of patient, ordering and performing treatments and interventions, ordering and review of laboratory studies, ordering and review of radiographic studies, pulse oximetry, re-evaluation of patient's condition.  This critical care time did not overlap with that of any other provider or involve time for any procedures.     Amanda Bhatia MD  Critical Care - Surgery  [facility] - [facility]

## 2023-09-01 NOTE — PT/OT/SLP PROGRESS
Occupational Therapy      Patient Name:  Jason Barros   MRN:  [id number]    Patient not seen today secondary to just extubate this am on Airvo. Per RN, pt medically appropriate for minimal activity and no OOB. OT/PT will evaluate pt 9/2  . Will follow-up 9/2.    9/1/2023

## 2023-09-01 NOTE — NURSING
Dr. Bazzi notified of LLE US results communicated by radiologist. MD came to bedside to assess pedal pulses. Dr. Lindsey with vascular service at bedside prior to US. Doppler signals present to L PT, absent in L DP, unchanged from previous shift. Pt nods head yes when asked if he feels sensation to the L foot.

## 2023-09-01 NOTE — NURSING
0900 - Per MD orders, Pt extubated with RN at RT at bedside. Pt tolerated procedure well; All VSS. On comfort flow 25L 50% with SpO2>95%.

## 2023-09-01 NOTE — ASSESSMENT & PLAN NOTE
Endocrinology consulted for BG management.   BG goal 140-180     Plan:   - Continue Novolog (aspart) insulin prn for BG excursions LDC SSI (150/50).  - BG checks q4hr  - Hypoglycemia protocol in place    ** Please notify Endocrine for any change and/or advance in diet**  ** Please call Endocrine for any BG related issues **    Discharge Planning:   TBD. Please notify endocrinology prior to discharge.

## 2023-09-01 NOTE — SUBJECTIVE & OBJECTIVE
Medications Prior to Admission   Medication Sig Dispense Refill Last Dose    aspirin 81 MG Chew Chew and swallow 1 tablet (81 mg total) by mouth once daily. 30 tablet 11 2023    atorvastatin (LIPITOR) 80 MG tablet Take 80 mg by mouth once daily.   2023    clopidogreL (PLAVIX) 75 mg tablet Take 75 mg by mouth.   2023    gabapentin (NEURONTIN) 400 MG capsule Take 400 mg by mouth 3 (three) times daily.   2023    iron-vitamin C 100-250 mg, ICAR-C, 100-250 mg Tab Take by mouth once daily.   Past Week    [] magnesium oxide (MAG-OX) 400 mg (241.3 mg magnesium) tablet Take 1 tablet (400 mg total) by mouth once daily. 30 tablet 0 Past Week    metFORMIN (GLUCOPHAGE) 500 MG tablet Take 2 tablets (1,000 mg total) by mouth 2 (two) times daily with meals. Restart on 23    metoprolol succinate (TOPROL-XL) 25 MG 24 hr tablet Take 1 tablet (25 mg total) by mouth once daily. 30 tablet 5 2023    oxyCODONE (ROXICODONE) 15 MG Tab TAKE 1 TABLET BY MOUTH EVERY 8 TO 12 HOURS AS NEEDED FOR FOR PAIN   Past Month    pantoprazole (PROTONIX) 40 MG tablet Take 40 mg by mouth once daily.   2023    solifenacin (VESICARE) 5 MG tablet Take 5 mg by mouth.   2023    valsartan (DIOVAN) 40 MG tablet Take 1 tablet (40 mg total) by mouth 2 (two) times daily. 60 tablet 5 2023    nitroGLYCERIN (NITROSTAT) 0.4 MG SL tablet Place 1 tablet (0.4 mg total) under the tongue every 5 (five) minutes as needed for Chest pain. Up to 3 doses. If chest pain is not relieved or worsens 3 to 5 minutes after 1 dose, call 911 and seek immediate emergency medical attention. 25 tablet 1 Unknown       Review of patient's allergies indicates:   Allergen Reactions    Ranolazine Shortness Of Breath     groggy         Past Medical History:   Diagnosis Date    Arthritis     Diabetes mellitus, type 2     Hypertension      Past Surgical History:   Procedure Laterality Date    CORONARY ANGIOGRAPHY Left 2023     Procedure: ANGIOGRAM, CORONARY ARTERY;  Surgeon: Alex Dietz MD;  Location: [facility] CATH LAB;  Service: Cardiology;  Laterality: Left;    ENDOSCOPIC HARVEST OF VEIN Left 8/30/2023    Procedure: SURGICAL PROCUREMENT, VEIN, ENDOSCOPIC;  Surgeon: Lazaro Garvey MD;  [facility];  Service: Cardiothoracic;  Laterality: Left;  Vein harvest start: 0932  Vein harvest stop: 1016  Vein prep start: 1020  Vein prep stop: 1100    IVUS, CORONARY  7/31/2023    Procedure: IVUS, Coronary;  Surgeon: Alex Dietz MD;  Location: [facility] CATH LAB;  Service: Cardiology;;    LEFT HEART CATHETERIZATION Left 7/24/2023    Procedure: Left heart cath;  Surgeon: Gloria Donald MD;  Location: [facility] CATH LAB;  Service: Cardiology;  Laterality: Left;  [facility]    REPEAT CORONARY ARTERY BYPASS GRAFTING N/A 8/30/2023    Procedure: CABG, REPEAT Redo Sternotomy;  Surgeon: Lazaro Garvey MD;  Location: [address];  Service: Cardiothoracic;  Laterality: N/A;  CABGx2     Family History    None       Tobacco Use    Smoking status: Every Day     Current packs/day: 1.00     Types: Cigarettes    Smokeless tobacco: Never   Substance and Sexual Activity    Alcohol use: Yes    Drug use: Never    Sexual activity: Not on file     Review of Systems   Unable to perform ROS: Intubated     Objective:     Vital Signs (Most Recent):  Temp: 99.7 °F (37.6 °C) (09/01/23 0300)  Pulse: 68 (09/01/23 0300)  Resp: 17 (09/01/23 0300)  BP: (!) 110/56 (09/01/23 0300)  SpO2: 97 % (09/01/23 0300) Vital Signs (24h Range):  Temp:  [99.1 °F (37.3 °C)-100.8 °F (38.2 °C)] 99.7 °F (37.6 °C)  Pulse:  [62-85] 68  Resp:  [14-31] 17  SpO2:  [87 %-100 %] 97 %  BP: ()/(47-70) 110/56  Arterial Line BP: ()/(42-68) 113/50     Weight: 85.7 kg (188 lb 15 oz)  Body mass index is 30.49 kg/m².      Physical Exam  HENT:      Head: Normocephalic and atraumatic.   Eyes:      Pupils: Pupils are equal, round, and reactive to light.   Cardiovascular:      Rate and  Rhythm: Normal rate.      Comments: Median sternotomy dressing present.  Nonpalpable femoral pulses.  Biphasic signals present bilaterally.  Popliteal:  Biphasic signals present bilaterally  DP:  No signals present bilaterally  PT:  Monophasic signals present bilaterally  Pulmonary:      Breath sounds: Transmitted upper airway sounds: Intubated.   Abdominal:      Tenderness: There is abdominal tenderness.   Musculoskeletal:      Right lower leg: No edema.      Left lower leg: No edema.      Comments: Able to push both feet down with 5/5 strength bilaterally.  No sensory deficits.          Significant Labs:  All pertinent labs from the last 24 hours have been reviewed.    Significant Diagnostics:  I have reviewed all pertinent imaging results/findings within the past 24 hours.

## 2023-09-01 NOTE — ASSESSMENT & PLAN NOTE
  Neuro/Psych:     - Sedation: none    - Pain:    - Scheduled Tylenol 1g q8h, robaxin   - Oxy 5, 10 PRN hydromorphone for breakthrough,  - gabapentin home med             Cardiac:     - S/P CABGx with Dr. Garvey on 8/30/2023    - BP Goal: MAP 60-80, SBP < 140    - Pressors: Epi, wean as able    - Anti-HTNs: Will start when appropriate    - Rhythm: NSR, AICD    - Beta blocker: Will start when appropriate, takes Toprol 25 daily at  home    - Statin: Atorvastatin 40 mg QD  - Discontinue warner today.   - Vascular surgery consulted. Low concern for acute limb ischemia of LLE. LE ultrasound showed partially occlusive thrombus in prox SFA  w complete occlusive thrombus in mid & distal SFA.   - consider FUNMILAYO PVRs for additional workup once more stable post op      Pulmonary:     - Goal SpO2 >92%    - Will wean ventilator support as tolerated to extubate   - Chest Tubes x 4 (2 Meds & 2 Pleural), 210cc, 210cc    - ABGs PRN    - Albuterol nebs q4    - SBT, parameters to extubate today to comfort flow  - Monitor closely as patient is at risk for re-intubation  - Encourage incentive spirometry, schedule nebulizer treatments q4, chest physiotherapy BID      Renal:    - Trend BUN/Cr     - Maintain Saenz, record strict Is/Os    - 720cc UOP/ 24hr, Net -400cc, 25-30cc/hr UOP   - Goal > 0.5 cc/kg/hour  - Uptrending CVPs 12,14,17  - Trial Lasix 20mg per CT Surg  Recent Labs   Lab 08/30/23  1636 08/31/23  0343 09/01/23  0332   BUN 11 15 17   CREATININE 0.7 0.9 0.8         FEN / GI:     - Daily CMP, PRN K/Mag/Phos per protocol     - Replace electrolytes as needed    - Nutrition: NPO pending extubation    - Bowel Regimen: Miralax, docusate    -  protonix at home, 40 mg daily, restart, dc famotidine      ID:     - Afebrile this morning, tmax 100.8 /24hr    - WBC trending down 11     - Abx: Complete perioperative cefazolin 2g Q8H x 5 doses    - No concerns for infection at this time    Recent Labs   Lab 08/30/23  2212 08/31/23  0343  09/01/23  0332   WBC 14.03* 13.44* 11.03         Heme/Onc:     - Hgb 11.8 pre-operatively    - CBC daily    - ASA 325mg    - Cont plavix 75mg  per CTS    Recent Labs   Lab 08/30/23  2212 08/30/23  2303 08/31/23  0343 09/01/23  0332   HGB 6.5*  --  9.2* 7.9*   *  --  109* 102*   APTT  --  30.5 29.2 31.4   INR  --  1.7* 1.6* 1.6*         Endocrine:     - CTS Goal -140, controlled with SSI    - Insulin gtt now discontinued     - HgbA1c: 5.5    - Endocrinology consulted for insulin management      PPx:   Feeding: npo  Analgesia/Sedation: multimodal  Thromboembolic Prevention: SCDs  HOB >30: Yes  Stress Ulcer: pepcid  Glucose Control: Yes, insulin management per Endocrinology     Lines/Drains/Airway:   ETT   Right radial arterial line   RIJ CVC   Saenz   Chest Tubes:4   Pacing Wires: Temporary ventricular pacing wires      Dispo/Code Status/Palliative:     - Continue SICU Care    - Full Code

## 2023-09-01 NOTE — PLAN OF CARE
Sedation off since around 2200. Still drowsy, but arouses to voice easily and follows commands. Ventilator on spontaneous mode 50% FiO2 5 PEEP. VSS throughout shift. MAP maintained 60-80 on Epi gtt @ 0.02 mcg/kg/min. LLE DP pulse absent, unchanged from previous shift. Dr. Bazzi with SICU service and Dr. Lindsey with vascular service notified of LLE US results. Vascular service at bedside this morning assessing pt LLE doppler signals. Pt reports no tingling or pain in LLE. CTS service to review vascular recommendations. UOP 25-30 mL/hr. CVPs trending upwards. Pt remained intubated overnight due to low NIF. Chest tube output adequate.    [Seasonal Allergies] : seasonal allergies [Thyroid Problem] : thyroid problem [Fever] : no fever [Chills] : no chills [Nasal Congestion] : no nasal congestion [Postnasal Drip] : no postnasal drip [Sinus Problems] : no sinus problems [Cough] : no cough [Chest Tightness] : no chest tightness [Sputum] : no sputum [Dyspnea] : no dyspnea [Pleuritic Pain] : no pleuritic pain [Wheezing] : no wheezing [SOB on Exertion] : no sob on exertion [Chest Discomfort] : no chest discomfort [Edema] : no edema [Palpitations] : no palpitations [Syncope] : no syncope [GERD] : no gerd [Abdominal Pain] : no abdominal pain [Nausea] : no nausea [Vomiting] : no vomiting [Diarrhea] : no diarrhea [Constipation] : no constipation [Back Pain] : no back pain [Anemia] : no anemia [Headache] : no headache [Seizures] : no seizures [Dizziness] : no dizziness [Numbness] : no numbness [Paralysis] : no paralysis [Confusion] : no confusion [Depression] : no depression [Anxiety] : no anxiety [Diabetes] : no diabetes

## 2023-09-01 NOTE — PHYSICIAN QUERY
PT Name: Jason Barros  MR #: [id number]    DOCUMENTATION CLARIFICATION      CDS/: Sil Guzman RN             Contact information: [email or web address]    This form is a permanent document in the medical record.      Query Date: August 31, 2023    By submitting this query, we are merely seeking further clarification of documentation. Please utilize your independent clinical judgment when addressing the question(s) below.    The Medical Record contains the following:   Indicators  Supporting Clinical Findings Location in Medical Record    Anemia documented     x H&H  08/30/23 16:36 08/30/23 22:12 08/31/23 03:43   Hemoglobin 9.1 (L) 6.5 (L) 9.2 (L)   Hematocrit 29.1 (L) 21.7 (L) 28.5 (L)    Lab                 x BP                    HR Vital Signs (24h Range):  Pulse:  [65-83] 74  BP: ()/(41-99) 96/62  Arterial Line BP: ()/(35-65) 68/35 PN 8/31       CC    Bleeding     x Procedure/Surgery Performed/EBL Procedure:    Procedure(s) (LRB):     1)  REDO CABG with LIMA-LAD and SVG-OM  94997, 64846, 13060     2)  REPEAT Redo Sternotomy  87920  Estimated Blood Loss (EBL):      100 mL   Brief Op Note 8/30   x   Transfusion(s)  Received 2 units overnight for hb 6.5, responded appropriately.    PN 8/31       CC   x Acute/Chronic illness PMHx CAD s/p prior CABG (vein graft to distal RCA), ICM, HFrEF (LVEF 20%), s/p ICD,  HTN, DM type 2.  presents to the SICU s/p redo CABGx2    PN 8/31       CC   x Treatments - Hgb 11.8 pre-operatively    - CBC daily PN 8/31       CC    Other       Provider, please specify diagnosis or diagnoses associated with above clinical findings.   [X   ] Acute blood loss anemia expected post-operatively    [   ] Other Hematological Diagnosis (please specify): _________________            Please document in your progress notes daily for the duration of treatment, until resolved, and include in your discharge summary.    Form No. [id number]

## 2023-09-01 NOTE — PROGRESS NOTES
Cardiac device interrogation and/or reprogramming completed by industry representative, Kilo with [employer]; please refer to report located in the Media tab.

## 2023-09-01 NOTE — NURSING
Dr. Holloway at bedside assessing patient's readiness for extubation. Precedex gtt stopped, parameters obtained. NIF remains poor. Ventilator switched to spontaneous mode. Sedation to remain off. Another attempt at parameters/extubation to be completed later this shift. US tech at bedside now performing LLE US.

## 2023-09-01 NOTE — PROGRESS NOTES
"[facility]  Endocrinology  Progress Note    Admit Date: 8/30/2023     Reason for Consult: Management of T2DM, Hyperglycemia     Surgical Procedure and Date: CABG on 8/30/2023    Diabetes diagnosis year: LY (intubated)    Home Diabetes Medications:  Metformin 1000 mg BID    How often checking glucose at home?  LY    BG readings on regimen: LY  Hypoglycemia on the regimen?  LY  Missed doses on regimen?  LY    Diabetes Complications include:     Hyperglycemia    Complicating diabetes co morbidities:   CTS      HPI: Jason Barros is a 72 y.o. male who presents s/p CABG surgery. Endocrine consulted to manage hyperglycemia and type 2 diabetes.    Lab Results   Component Value Date    HGBA1C 5.5 08/29/2023                 Interval HPI:   Overnight events: NAEON. Remains in SICU. POD 2. BG well controlled with prn SQ insulin correction scale. Diet NPO    Eating:   NPO  Nausea: No  Hypoglycemia and intervention: No  Fever: No  TPN and/or TF: No  If yes, type of TF/TPN and rate: n/a    /62   Pulse 69   Temp 99.7 °F (37.6 °C)   Resp (!) 21   Ht 5' 6" (1.676 m)   Wt 93.5 kg (206 lb 2.1 oz)   SpO2 (!) 93%   BMI 33.27 kg/m²     Labs Reviewed and Include    Recent Labs   Lab 09/01/23  0332 09/01/23  0832   *  --    CALCIUM 8.2*  --    ALBUMIN 3.4*  --    PROT 5.5*  --      --    K 4.1 3.9   CO2 22*  --      --    BUN 17  --    CREATININE 0.8  --    ALKPHOS 58  --    ALT 6*  --    AST 21  --    BILITOT 1.2*  --      Lab Results   Component Value Date    WBC 11.03 09/01/2023    HGB 7.9 (L) 09/01/2023    HCT 22 (L) 09/01/2023    MCV 90 09/01/2023     (L) 09/01/2023     No results for input(s): "TSH", "FREET4" in the last 168 hours.  Lab Results   Component Value Date    HGBA1C 5.5 08/29/2023       Nutritional status:   Body mass index is 33.27 kg/m².  Lab Results   Component Value Date    ALBUMIN 3.4 (L) 09/01/2023    ALBUMIN 3.3 (L) 08/31/2023    ALBUMIN 2.5 (L) " "08/30/2023     No results found for: "PREALBUMIN"    Estimated Creatinine Clearance: 90.7 mL/min (based on SCr of 0.8 mg/dL).    Accu-Checks  Recent Labs     08/31/23  0720 08/31/23  0809 08/31/23  0925 08/31/23  1028 08/31/23  1116 08/31/23  1506 08/31/23  1931 09/01/23  0005 09/01/23  0330 09/01/23  0832   POCTGLUCOSE 108 103 123* 126* 122* 118* 126* 138* 153* 130*       Current Medications and/or Treatments Impacting Glycemic Control  Immunotherapy:    Immunosuppressants       None          Steroids:   Hormones (From admission, onward)      None          Pressors:    Autonomic Drugs (From admission, onward)      Start     Stop Route Frequency Ordered    08/30/23 1615  EPINEPHrine 5 mg in dextrose 5% 250 mL infusion (premix)        Question Answer Comment   Begin at (in mcg/kg/min): 0.02    Titrate by: (in mcg/kg/min) 0.02    Titrate interval: (in minutes) 5    Titrate to maintain: (SBP or MAP or Cardiac Index) MAP    Greater than: (in mmHg) 65    Cardiac index greater than: (in L/min) 2.2    Maximum dose: (in mcg/kg/min) 2        -- IV Continuous 08/30/23 1604          Hyperglycemia/Diabetes Medications:   Antihyperglycemics (From admission, onward)      Start     Stop Route Frequency Ordered    08/31/23 1621  insulin aspart U-100 pen 0-10 Units         -- SubQ Every 4 hours PRN 08/31/23 1522            ASSESSMENT and PLAN    Cardiac/Vascular  * CAD, multiple vessel  Managed per primary team  Avoid hypoglycemia        Hyperlipidemia    On statin therapy per ADA guidelines.     Endocrine  Type 2 diabetes mellitus  Endocrinology consulted for BG management.   BG goal 140-180     Plan:   - Continue Novolog (aspart) insulin prn for BG excursions LDC SSI (150/50).  - BG checks q4hr  - Hypoglycemia protocol in place    ** Please notify Endocrine for any change and/or advance in diet**  ** Please call Endocrine for any BG related issues **    Discharge Planning:   TBD. Please notify endocrinology prior to " discharge.              Eugenia Jauregui NP  Endocrinology  [facility]

## 2023-09-01 NOTE — CONSULTS
[facility]  Vascular Surgery  Consult Note    Consults  Subjective:     Chief Complaint/Reason for Admission: S/p CABG    History of Present Illness: 72-year-old gentleman postoperative day 1 status post CABG, history of CAD/CABG, HFrEF, ICD, on ASA/Plavix, who presented for angina.  Had recent cardiac catheterization 1 month ago with failed Perclose device into the right groin, found to have three-vessel cardiac disease.  Went to the OR yesterday for repeat CABG with left saphenous great harvest.    Vascular surgery called for concern for change in distal extremity exam.  Postop reportedly had left foot DP and PT signals, now with only left PT signal.    Patient currently remains intubated sedated on pressors, although able to answer questions by nodding head.    Answers no to questions of foot pain, numbness or tingling.      Medications Prior to Admission   Medication Sig Dispense Refill Last Dose    aspirin 81 MG Chew Chew and swallow 1 tablet (81 mg total) by mouth once daily. 30 tablet 11 2023    atorvastatin (LIPITOR) 80 MG tablet Take 80 mg by mouth once daily.   2023    clopidogreL (PLAVIX) 75 mg tablet Take 75 mg by mouth.   2023    gabapentin (NEURONTIN) 400 MG capsule Take 400 mg by mouth 3 (three) times daily.   2023    iron-vitamin C 100-250 mg, ICAR-C, 100-250 mg Tab Take by mouth once daily.   Past Week    [] magnesium oxide (MAG-OX) 400 mg (241.3 mg magnesium) tablet Take 1 tablet (400 mg total) by mouth once daily. 30 tablet 0 Past Week    metFORMIN (GLUCOPHAGE) 500 MG tablet Take 2 tablets (1,000 mg total) by mouth 2 (two) times daily with meals. Restart on 23    metoprolol succinate (TOPROL-XL) 25 MG 24 hr tablet Take 1 tablet (25 mg total) by mouth once daily. 30 tablet 5 2023    oxyCODONE (ROXICODONE) 15 MG Tab TAKE 1 TABLET BY MOUTH EVERY 8 TO 12 HOURS AS NEEDED FOR FOR PAIN   Past Month    pantoprazole  (PROTONIX) 40 MG tablet Take 40 mg by mouth once daily.   8/29/2023    solifenacin (VESICARE) 5 MG tablet Take 5 mg by mouth.   8/29/2023    valsartan (DIOVAN) 40 MG tablet Take 1 tablet (40 mg total) by mouth 2 (two) times daily. 60 tablet 5 8/29/2023    nitroGLYCERIN (NITROSTAT) 0.4 MG SL tablet Place 1 tablet (0.4 mg total) under the tongue every 5 (five) minutes as needed for Chest pain. Up to 3 doses. If chest pain is not relieved or worsens 3 to 5 minutes after 1 dose, call 911 and seek immediate emergency medical attention. 25 tablet 1 Unknown       Review of patient's allergies indicates:   Allergen Reactions    Ranolazine Shortness Of Breath     groggy         Past Medical History:   Diagnosis Date    Arthritis     Diabetes mellitus, type 2     Hypertension      Past Surgical History:   Procedure Laterality Date    CORONARY ANGIOGRAPHY Left 7/31/2023    Procedure: ANGIOGRAM, CORONARY ARTERY;  Surgeon: Alex Dietz MD;  Location: [facility] CATH LAB;  Service: Cardiology;  Laterality: Left;    ENDOSCOPIC HARVEST OF VEIN Left 8/30/2023    Procedure: SURGICAL PROCUREMENT, VEIN, ENDOSCOPIC;  Surgeon: Lazaro Garvey MD;  Location: [address];  Service: Cardiothoracic;  Laterality: Left;  Vein harvest start: 0932  Vein harvest stop: 1016  Vein prep start: 1020  Vein prep stop: 1100    IVUS, CORONARY  7/31/2023    Procedure: IVUS, Coronary;  Surgeon: Alex Dietz MD;  Location: [facility] CATH LAB;  Service: Cardiology;;    LEFT HEART CATHETERIZATION Left 7/24/2023    Procedure: Left heart cath;  Surgeon: Gloria Donald MD;  Location: [facility] CATH LAB;  Service: Cardiology;  Laterality: Left;  [facility]    REPEAT CORONARY ARTERY BYPASS GRAFTING N/A 8/30/2023    Procedure: CABG, REPEAT Redo Sternotomy;  Surgeon: Lazaro Garvey MD;  Location: [address];  Service: Cardiothoracic;  Laterality: N/A;  CABGx2     Family History    None       Tobacco Use    Smoking status: Every Day     Current  packs/day: 1.00     Types: Cigarettes    Smokeless tobacco: Never   Substance and Sexual Activity    Alcohol use: Yes    Drug use: Never    Sexual activity: Not on file     Review of Systems   Unable to perform ROS: Intubated     Objective:     Vital Signs (Most Recent):  Temp: 99.7 °F (37.6 °C) (09/01/23 0300)  Pulse: 68 (09/01/23 0300)  Resp: 17 (09/01/23 0300)  BP: (!) 110/56 (09/01/23 0300)  SpO2: 97 % (09/01/23 0300) Vital Signs (24h Range):  Temp:  [99.1 °F (37.3 °C)-100.8 °F (38.2 °C)] 99.7 °F (37.6 °C)  Pulse:  [62-85] 68  Resp:  [14-31] 17  SpO2:  [87 %-100 %] 97 %  BP: ()/(47-70) 110/56  Arterial Line BP: ()/(42-68) 113/50     Weight: 85.7 kg (188 lb 15 oz)  Body mass index is 30.49 kg/m².      Physical Exam  HENT:      Head: Normocephalic and atraumatic.   Eyes:      Pupils: Pupils are equal, round, and reactive to light.   Cardiovascular:      Rate and Rhythm: Normal rate.      Comments: Median sternotomy dressing present.  Nonpalpable femoral pulses.  Biphasic signals present bilaterally.  Popliteal:  Biphasic signals present bilaterally  DP:  No signals present bilaterally  PT:  Monophasic signals present bilaterally  Pulmonary:      Breath sounds: Transmitted upper airway sounds: Intubated.   Abdominal:      Tenderness: There is abdominal tenderness.   Musculoskeletal:      Right lower leg: No edema.      Left lower leg: No edema.      Comments: Able to push both feet down with 5/5 strength bilaterally.  No sensory deficits.          Significant Labs:  All pertinent labs from the last 24 hours have been reviewed.    Significant Diagnostics:  I have reviewed all pertinent imaging results/findings within the past 24 hours.    Assessment/Plan:     PVD (peripheral vascular disease)  72-year-old gentleman postop day 1 status post CABG who presents with change in lower extremity pulse exam.    - Low concern for acute limb ischemia given equal bilateral examination, no complaints of foot  pain, no motor or sensory deficits appreciated.  - likely a component of chronic peripheral vascular disease given lack of femoral pulses.  - agree with obtaining arterial duplex for initial workup, can consider FUNMILAYO PVRs for additional workup once more stable from postoperative state.        Thank you for your consult. I will follow-up with patient. Please contact us if you have any additional questions.    Jerson Lindsey MD  Vascular Surgery  [facility]

## 2023-09-01 NOTE — HPI
72-year-old gentleman postoperative day 1 status post CABG, history of CAD/CABG, HFrEF, ICD, on ASA/Plavix, who presented for angina.  Had recent cardiac catheterization 1 month ago with failed Perclose device into the right groin, found to have three-vessel cardiac disease.  Went to the OR yesterday for repeat CABG with left saphenous great harvest.    Vascular surgery called for concern for change in distal extremity exam.  Postop reportedly had left foot DP and PT signals, now with only left PT signal.    Patient currently remains intubated sedated on pressors, although able to answer questions by nodding head.    Answers no to questions of foot pain, numbness or tingling.

## 2023-09-02 VITALS
SYSTOLIC BLOOD PRESSURE: 142 MMHG | RESPIRATION RATE: 18 BRPM | TEMPERATURE: 99 F | HEIGHT: 66 IN | BODY MASS INDEX: 33.13 KG/M2 | WEIGHT: 206.13 LBS | DIASTOLIC BLOOD PRESSURE: 95 MMHG | OXYGEN SATURATION: 82 %

## 2023-09-02 PROCEDURE — 35820 EXPLORE CHEST VESSELS: CPT

## 2023-09-02 PROCEDURE — 36556 INSERT NON-TUNNEL CV CATH: CPT

## 2023-09-02 NOTE — NURSING
MD and charge nurse called to bedside around 2100 when patient became acutely hypotensive. Rapid up-titration of epi and levo gtts with vaso already infusing. Patient became unresponsive and lost pulse. Compressions started and rescue breathing performed. RT called to bedisde ROSC achieved. Intubation performed per anesthesia. Bedside echo performed by CTS and cardiology fellow. Patient continued to be unstable requiring two more rounds of compressions before decision was made to open chest. LAUREN performed. Patient remained on high doses of presser and inotropic support while MDs explored chest and performed cardiac massage. 1u RBC, 500cc 5% albumin, and 1L IVF bolused during resuscitative efforts. Resuscitation stopped after over an hour. Medications continued and vent remained in place while family said their goodbyes.  and  at bedside. Emotional support provided. Patient's wife expressed wishes to stop medications and remove breathing tube. TOD called by Dr. Lawrence at 2258.    ICU attending Dr. Gastelum present at bedside during code. CTS attending Dr. Garvey updated by CTS team.     Code documentation per eICU.

## 2023-09-02 NOTE — SIGNIFICANT EVENT
Around 2100 I was called by the primary nurse stating that Mr Barros was becoming increasingly hypotensive with tachycardia.  Given that he had an episode of unstable atrial fibrillation with rapid ventricular response requiring cardioversion twice earlier today I immediately went to bedside.  He was rapidly increasing on pressors to 2 of epi, 2 of Levophed, and 0.04 of vasopressin with minimal improvement in his blood pressure which were in the 50s systolic.  The patient lost pulses and a code blue was called, after 2 rounds of compressions Ross was achieved.   Dr. aGstelum (ICU attending) was immediately called during the decompensation who stated he was coming to the hospital as well as to order a stat echo. Maria Esther Sotomayor NP was also present who spoke to the cardiac surgery fellow on-call who stated that he was coming to the hospital to evaluate the patient.  Anesthesia arrived and the patient was intubated by Dr. Moses successfully.  A bedside echo was performed by the Cardiology fellow on-call who noted that there was significant RV collapse with what appeared to be large effusion juxtaposed.  The patient initially stabilized however he went into an unstable junctional rhythm requiring chest compressions once again.  We discussed the patient with the fellow on call and Dr. Garvey who agreed that given the findings of the bedside echo that it was in the patient's best interest to open his chest.  Intervention cardiology was also called to assist with bedside placement of a balloon pump.    The patient's chest was prepped with Betadine and incision was made in the prior chest incision, the chest was opened in standard fashion which noted a significant amount of old clot without new blood filling.  During compressions we did not note a significant amount of blood that was coming from his chest tube sites.  At this point Dr. Gastelum arrived, please see his note for further significant events.    Nando Lawrence,  MD [facility] PGY-3

## 2023-09-02 NOTE — ANESTHESIA PROCEDURE NOTES
Ad Hoc Intubation    Date/Time: 9/1/2023 9:21 PM    Performed by: Mayito Moses MD  Authorized by: Mayito Moses MD    Indications:  Respiratory failure, airway protection and hypoxemia  Diagnosis:  PEA Arrest  Patient Location:  ICU  Timeout:  9/1/2023 9:15 PM  Procedure Start Time:  9/1/2023 9:16 PM  Procedure End Time:  9/1/2023 9:22 PM  Staff:     Anesthesiologist Present: No    Intubation:     Induction:  Rapid sequence induction    Intubated:  Postinduction    Mask Ventilation:  Easy mask    Attempts:  1    Attempted By:  Resident anesthesiologist    Method of Intubation:  Video laryngoscopy    Blade:  Jones 3    Laryngeal View Grade: Grade I - full view of chords      Difficult Airway Encountered?: No      Complications:  None    Airway Device:  Oral endotracheal tube    Airway Device Size:  7.5    Style/Cuff Inflation:  Cuffed    Tube secured:  24    Secured at:  The lips    Placement Verified By:  Colorimetric ETCO2 device and Revisualization with laryngoscopy    Complicating Factors:  None    Findings Post-Intubation:  BS equal bilateral and atraumatic/condition of teeth unchanged

## 2023-09-02 NOTE — ANESTHESIA POSTPROCEDURE EVALUATION
Anesthesia Post Evaluation    Patient: Jason Barros    Procedure(s) Performed: Procedure(s) (LRB):  CABG, REPEAT Redo Sternotomy (N/A)  SURGICAL PROCUREMENT, VEIN, ENDOSCOPIC (Left)    Final Anesthesia Type: general      Patient location during evaluation: ICU      Anesthetic complications: no        Comments: Pt           Vitals Value Taken Time   /95 23 2202   Temp 37.2 °C (98.96 °F) 23 2220   Pulse 0 23 2300   Resp 18 23 2300   SpO2 44 % 23 2247   Vitals shown include unvalidated device data.      No case tracking events are documented in the log.      Pain/Bonita Score: Pain Rating Prior to Med Admin: 9 (2023  5:43 PM)  Pain Rating Post Med Admin: 5 (2023  6:14 AM)

## 2023-09-02 NOTE — ANESTHESIA POSTPROCEDURE EVALUATION
Anesthesia Post Evaluation    Patient: Jason SREE Barros    Procedure(s) Performed: Procedure(s) (LRB):  CABG, REPEAT Redo Sternotomy (N/A)  SURGICAL PROCUREMENT, VEIN, ENDOSCOPIC (Left)      No case tracking events are documented in the log.        Pt

## 2023-09-02 NOTE — SIGNIFICANT EVENT
I was called at about 21:08 by Dr. Lawrence (ICU resident) about Mr. Barros' clinical deterioration. I immediately came to the hospital. I called Dr. Lawrence on the way to the hospital who informed me that the patient had an episode of unstable AF with profound hypotension. As per report and review of telemetry this was followed by PEA. After 2 rounds of chest compressions, his BP recovered with very high doses of pressors and inotropes. Anesthesia had been called to intubate the patient and I requested Dr. Lawrence to call the cardiology team for a bedside echo and the CTS fellows. I called Dr. Garvey to update him about the situation. When I arrived, the patient was in extremis on very high doses of epi, vaso and levo. He had been intubated and was being ventilated by hand. Preparations were made to open his chest. I managed the resuscitation via titration of his vasoactive infusions and multiple boluses of dilute epinephrine and vasopressin along with IVF. The patient's airway was connected to ventilator. ABG shortly after showed showed hypercarbia so the RR was adjusted. Resuscitative efforts continued for over an hour with open cardiac massage, high doses of vasopressors and inotropes. LAUREN performed by Dr. Noble showed a RV that was essentially akinetic with severely reduced LVEF. Dr. Garvey was informed and stated the patient was not a candidate for advanced therapies in the setting of RV failure. ABG showed profound acidosis. Decision was made with CTS team to stop resuscitation as this was a futile situation.  I had a brief family meeting with the patient's spouse Jackie, his daughter Abby and two other family members. Maria Esther Sotomayor NP was also in attendance. I reviewed the clinical situation and informed them that despite heroic measures Mr. Barros's hemodynamics were not compatible with life. I informed them that we would attempt to keep him alive so they could say goodbye to him. Maria Esther and  myself informed him that we would keep him comfortable throughout. His wife expressed appreciation for the care he received and that this was not a completely unexpected outcome. The patient's daughters and his grandaughters were tearful. Emotional support was provided. Jackie asked about next steps and for the stephanie to be at the bedside. It was explained to her that we could stop medications once her and her family had a chance to see the patient or we could continue treating him as we had been. She understood how sick he was and wished to let nature take its course without invasive treatments. The nursing team called the stephanie.         65 minutes of critical care time was spent personally by me on the following activities: development of treatment plan with patient or surrogate and bedside caregivers, discussions with consultants, evaluation of patient's response to treatment, examination of patient, ordering and performing treatments and interventions, ordering and review of laboratory studies, ordering and review of radiographic studies, pulse oximetry, re-evaluation of patient's condition.  This critical care time did not overlap with that of any other provider or involve time for any procedures.    Marco A Mathias MD  Anesthesiology/Critical Care

## 2023-09-02 NOTE — PLAN OF CARE
0900- Pt extubated with parameters to comfort flow    1100- Pt with elevated HR (130s-180s). Dr. Nirav Mathias and charge RN at bedside. Pt's BP dropping to SBP 50s-70s. Epi gtt increased. Amio bolus administered x 2. Cardioversion x 2. BP remained low. Levo, vaso, and amio gtt started.     1200- AICD interrogated    1300 - IV tylenol administered for temp 100.6    1420- bedside echo performed    1545- MDs at bedside removing Mediastinal CTs. Pt tolerated well.     Gtts: Amio @ 0.5mg/min          Epi @ 0.06mcg/kg/min          Lasix @ 15mg/hr          Vaso @ 0.04units/min    Speech consulted (pt clearing throat and coughing frequently). Was unable to see today. Will follow up tomorrow.     BG monitored Q4. Sliding scale utilized.     CVP: 17/27/20    Patient, spouse, and daughter updated throughout shift on plan of care. All questions/concerns addressed.

## 2023-09-02 NOTE — CODE/ RAPID DOCUMENTATION
Dr. Nando Lawrence, Dr. Justo Holloway, and Dr. Cash Narvaez are in room and prepare to set up to open chest of pt per sterile tech.  After preparing sterile field, Dr. Lawrence reopens surgical wound from previous CABG, to reenter chest cavity, to evalute for clots in the heart, after removing micheal, and Dr. Holloway on the vent side of the pt suctioning the chest cavity of pt, Dr. Fitch replaces Dr. Lawrence on the monitor side while Dr. Holloway perform internal heart massage, Dr. Fitch uses rib spreaders to gain better visual assess to chest area.  Dr. Lawrence sends someone for a IABP while he sets up for procedure at the left femoral area.  Dr. Fitch and Dr. Holloway take turns continually massaging the heart during the code.  Dr. Lawrence attempted to stick pt for access for IABP, but unable to do so, and IABP was never brought to room, and doctors weren't able to get pt's heart to continue more than a minute before having to continue to massage.  This continued before the decision was made to call it at [id number].

## 2023-09-02 NOTE — EICU
Intervention Initiated From:  COR / HAMZAH Hollis intervened regarding:  Documentation    Called into room w/ code blue already in progress, was told per Willa Yoder RN that code started @ 2115, original rhythm PEA, and 1mg of epinephrine given @ at 2117, and pt had already been intubated, see code documentation for further details

## 2023-09-02 NOTE — SIGNIFICANT EVENT
Death Note    Called to bedside by patient's nurse. Nursing supervisor notified.  has been called and is also at bedside.    Patient is not responding to verbal or tactile stimuli. Patient does not have a pupillary or corneal reflex. His pupils are fixed and dilated. No heart or breath sounds on auscultation. No respirations. No palpable pulses.     Time of death: 2258    Cause of Death: Cardiogenic Shock    Nando Lawrence MD  [facility]

## 2023-09-03 LAB
BLD PROD TYP BPU: NORMAL
BLOOD UNIT EXPIRATION DATE: NORMAL
BLOOD UNIT TYPE CODE: 6200
BLOOD UNIT TYPE: NORMAL
CODING SYSTEM: NORMAL
CROSSMATCH INTERPRETATION: NORMAL
DISPENSE STATUS: NORMAL
NUM UNITS TRANS PACKED RBC: NORMAL

## 2023-09-05 NOTE — OP NOTE
DATE OF PROCEDURE:  8/30/2023     ATTENDING SURGEON:  Lazaro Garvey M.D.    ASSISTANT: Justo Holloway MD, (Cardiothoracic Resident)     PREOPERATIVE DIAGNOSES:  1. Coronary artery disease including left main disease.  2. Diabetes mellitus.  3. Hypertension.  4. Hyperlipidemia.  5. Ischemic cardiomyopathy  6. Previous coronary artery bypass grafting  7. Chronic systolic heart failure     POSTOPERATIVE DIAGNOSES:  1. Coronary artery disease.  2. Diabetes mellitus.  3. Hypertension.  4. Hyperlipidemia.  5. Ischemic cardiomyopathy  6. Previous coronary artery bypass grafting  7. Chronic systolic heart failure     OPERATION PERFORMED:      1)  Coronary artery bypass grafting x2, with left internal   mammary artery to left anterior descending and saphenous vein graft to the obtuse marginal    2)  Redo sternotomy     ANESTHESIA:  General endotracheal.     ESTIMATED BLOOD LOSS:  100 mL.     BRIEF HISTORY:  Mr. Papo ayers is a very pleasant 72-year-old gentleman with known coronary artery disease.  He has undergone previous bypass surgery.  He complained of angina and underwent coronary angiography which demonstrated a 90% left main lesion.  He also had an ejection fraction of 20%, although a PET study demonstrated significant viability.  A heart team discussion was held and it was felt that percutaneous intervention was not an option.  He now presents for redo sternotomy and coronary artery bypass grafting.     PROCEDURE IN DETAIL:  After obtaining informed and written consent, the patient   was brought to the Operating Room and placed on the operating table in supine   position.  After induction of adequate general endotracheal anesthesia, the   patient's chest, abdomen, pelvis and bilateral lower extremities were prepped   and draped in the usual sterile fashion. A wire was placed in the right common femoral artery using Seldinger technique. Its position in the descending aorta was confirmed using LAUREN. We then  turned our attention to the chest, where the patient's previous upper midline   skin incision was reopened.  Dissection was carried down to the level of the   sternum.  The wires were divided, but not removed.  The oscillating saw was then   used to divide the anterior table of the sternum.  The wires were removed, and   the posterior table of the sternum was divided using the straight Mclaughlin scissors.    Once the sternum had been safely traversed, the mammary retractor was placed   and the retrosternal adhesions were taken down first on the left and then on the   right using the electrocautery device.  While the retractor was in place, the left internal mammary artery was completely dissected, but not divided. The sternal retractor was then placed,   and we began the intrapericardial portion of the dissection.  Not surprisingly, the   adhesions were at least moderate.  We began at the diaphragmatic surface of the   heart, and carefully worked our way down to the inferior vena cava.  We then   proceeded along the right atrial free wall.  The right atrial free wall was   fairly densely adherent to the pericardium.  Eventually, we were able to work our way to  the superior vena cava.  We were able to avoid injury to the vein graft.  We   were able to expose the anterior surface of the superior vena cava.  We then   turned our attention to the aorta.  We were able to dissect distally   until we reached the origin of the innominate artery.  At this time, we had   adequate exposure for cannulation.  Concurrently with the chest dissection, a skin incision had been made over the left lower extremity. Through the left lower extremity skin   incision, greater saphenous vein was harvested using endoscopic technique.  Once   the vein was out, the leg was closed in multiple layers of absorbable suture   material.   The patient was systemically heparinized.      Cannulation sutures were placed in the aorta and in the right atrial  appendage.    The aortic cannula was inserted, followed by the venous.  Antegrade and   retrograde cardioplegia catheters were placed.  The mammary was divided   distally, and its end was prepared.  The patient was then put on cardiopulmonary   bypass.  Once on bypass, the aorta was  from the pulmonary artery, the aortic crossclamp was applied and the heart was   arrested using cold blood enhanced antegrade cardioplegia.  A prompt   electromechanical arrest was achieved.  Once the cardioplegia was all in, completed mobilization of the left sided adhesions.  We then   turned our attention to the lateral wall.  The obtuse marginal was identified and an arteriotomy was made.  This was a diffusely diseased vessel.  The vein was brought up, its end was   spatulated, and the anastomosis was performed using a running 7-0 Prolene   suture.  After completion of the anastomosis, it was tested.  It was hemostatic.    Another dose of cardioplegia was given retrograde and down this vein.  We then   turned our attention to the anterior wall.  A site suitable for grafting in the LAD was identified, and an arteriotomy was made.  The   mammary was brought up, and the anastomosis was performed using a running 7-0   Prolene suture.  After completion of the anastomosis, it was tested.  It was   hemostatic.  Another dose of cardioplegia was given retrograde and down the vein.  The mammary pedicle was tacked to the heart using two 5-0   Prolene sutures.  We then prepared to do the venous proximal. A #11 blade was used to incise the aorta and an aortic   punch to enlarge the aortotomy.  The vein was checked for rotation, and cut to   an appropriate length.  The end was spatulated, and the proximal   anastomosis was performed using a running 5-0 Prolene suture.  After completion   of the proximal, the hot shot was given retrograde.  Root de-airing maneuvers   were performed, and the aortic cross-clamp was removed.  The patient was    subsequently weaned from cardiopulmonary bypass.  The patient did separate   easily from bypass.  Once off bypass, all surgical sites were inspected.  There   was good hemostasis.  The test dose of protamine was administered, and this was   well tolerated.  The total dose was then given.  [place] through the total dose,   all cannulas were removed.  All surgical sites were again inspected, again   there was good hemostasis.  Ventricular pacing wires were placed.  Drains   were placed.  After again   confirming adequate hemostasis, the patient's chest was closed using #6   stainless steel wires to reapproximate the sternum.  The overlying soft tissues   were reapproximated using absorbable suture material.  The patient's chest was   washed and dried, and a dry dressing was applied.  The patient tolerated the   procedure well, there were no complications.  At the conclusion of the case,   sponge and instrument counts were correct.

## 2023-09-06 NOTE — PHYSICIAN QUERY
PT Name: Jason Barros  MR #: [id number]     DOCUMENTATION CLARIFICATION     CDS/: Sil Guzman RN             Contact information: [email or web address]  This form is a permanent document in the medical record.     Query Date: September 6, 2023    By submitting this query, we are merely seeking further clarification of documentation.  Please utilize your independent clinical judgment when addressing the question(s) below.    The Medical Record contains the following   Indicators Supporting Clinical Findings   Location in Medical Record   x Heart Failure documented Acute on chronic combined systolic and diastolic heart failure  TTE as of 7/2023 with EF 25%, RV dysfunction.  GDMT: toprol and valsartan, will hold in immediate post-operative setting and restart when clinically appropriate      PREOPERATIVE DIAGNOSES:      1. Coronary artery disease including left main disease.  2. Diabetes mellitus.  3. Hypertension.  4. Hyperlipidemia.  5. Ischemic cardiomyopathy  6. Previous coronary artery bypass grafting  7. Chronic systolic heart failure     POSTOPERATIVE DIAGNOSES:      1. Coronary artery disease.  2. Diabetes mellitus.  3. Hypertension.  4. Hyperlipidemia.  5. Ischemic cardiomyopathy  6. Previous coronary artery bypass grafting  7. Chronic systolic heart failure    H&P 8/30       Critical Care          Op Note 8/30       CTS    BNP     x EF/Echo   Left Ventricle: The left ventricle is normal in size. Increased ventricular mass. Normal wall thickness. Unable to assess wall motion due to image quality. Septal motion is consistent with bundle branch block. There is severely reduced systolic function. Ejection fraction by visual approximation is 20%. Grade II diastolic dysfunction.    Left Atrium: Left atrium is mildly dilated.    Right Ventricle: Moderate right ventricular enlargement. Wall thickness is normal. Right ventricle wall motion has global hypokinesis. Systolic function is moderately reduced.  Pacemaker lead present in the ventricle.    Right Atrium: Right atrium is dilated.    Tricuspid Valve: There is mild regurgitation.    Pulmonic Valve: There is mild regurgitation.    Pulmonary Artery: The estimated pulmonary artery systolic pressure is 33 mmHg.    IVC/SVC: Normal venous pressure at 3 mmHg.   ECHO 9/1   x Radiology findings IMPRESSION:  Increased pulmonary vascular engorgement, and interstitial and early alveolar pulmonary edema, respectively.  Pneumonia or other pathology not fully excluded.  Correlate clinically, and with follow-up films.  This report was flagged in Epic as abnormal.    CXR 8/30    Subjective/Objective Respiratory Conditions      Recent/Current MI      Heart Transplant, LVAD     x Edema, JVD    Right lower leg: No edema.     Left lower leg: No edema. H&P 8/30       Critical Care    Ascites     x Diuretics/Meds Furosemide  infusion    Furosemide 20 mg IV     - Monitor UOP and aim for > 0.5 cc/kg/hour. Consider lasix drip.    Milrinone 20 mg infusion     Dobutamine infusion     MAR 9/1- 9/2  MAR 9/1    PN 9/1       Critical Care    MAR 8/30- 91  MAR 8/30    Other Treatment      Other       Heart failure is a clinical diagnosis which includes symptomatic fluid retention, elevated intracardiac pressures, and/or the inability of the heart to deliver adequate blood flow.    Heart Failure with reduced Ejection Fraction (HFrEF) or Systolic Heart Failure (loses ability to contract normally, EF is <40%)    Heart Failure with preserved Ejection Fraction (HFpEF) or Diastolic Heart Failure (stiff ventricles, does not relax properly, EF is >50%)     Heart Failure with Combined Systolic and Diastolic Failure (stiff ventricles, does not relax properly and EF is <50%)    Mid-range or mildly reduced ejection fraction (HFmrEF) is classified as systolic heart failure.  Congestive heart failure with a recovered EF is classified as Diastolic Heart Failure.  Common clues to acute exacerbation:  Rapidly  progressive symptoms (w/in 2 weeks of presentation), using IV diuretics, using supplemental O2, pulmonary edema on Xray, new or worsening pleural effusion, +JVD or other signs of volume overload, MI w/in 4 weeks, and/or BNP >500  The clinical guidelines noted are only system guidelines, and do not replace the providers clinical judgment.    Due to conflicting documentation, please clarify the type and acuity of the Congestive Heart Failure diagnosis associated with the above clinical findings.    [ X  ]  Acute on Chronic Systolic Heart Failure (HFrEF or HFmrEF) - worsening of CHF signs/symptoms in preexisting CHF   [   ]  Chronic Systolic Heart Failure (HFrEF or HFmrEF) - preexisting and stable     [   ]  Acute on Chronic Combined Systolic and Diastolic Heart Failure - worsening of CHF signs/symptoms in preexisting CHF     [   ]  Chronic Combined Systolic and Diastolic Heart Failure - pre-existing and stable     [   ]  Other (please specify): ___________________________________             Please document in your progress notes daily for the duration of treatment until resolved and include in your discharge summary.    References:  American Heart Association editorial staff. (2017, May). Ejection Fraction Heart Failure Measurement. American Heart Association. https://www.heart.org/en/health-topics/heart-failure/diagnosing-heart-failure/ejection-fraction-heart-failure-measurement#:~:text=Ejection%20fraction%20(EF)%20is%20a,pushed%20out%20with%20each%20heartbeat  DANIEL Zamora (2020, December 15). Heart failure with preserved ejection fraction: Clinical manifestations and diagnosis. [employer]. https://www.[employer]Burst.[employer]/contents/heart-failure-with-preserved-ejection-fraction-clinical-manifestations-and-diagnosis.  ICD-10-CM/PCS Coding Clinic Third Quarter ICD-10, Effective with discharges: September 8, 2020 [facility] § Heart failure with mid-range or mildly reduced ejection fraction  (2020).  ICD-10-CM/PCS Coding Clinic Third Quarter ICD-10, Effective with discharges: September 8, 2020 [facility] § Heart failure with recovered ejection fraction (2020).  Form No. [id number]

## 2023-09-07 NOTE — DISCHARGE SUMMARY
[facility] - Surgical Intensive Care  Cardiothoracic Surgery  Discharge Summary      Patient Name: Jason Barros  MRN: [id number]  Admission Date: 8/30/2023  Hospital Length of Stay: 2 days  Discharge Date and Time: 9/8/2023   Attending Physician: No att. providers found   Discharging Provider: Justo Holloway MD  Primary Care Provider: Kingsley Painting Jr., MD     HPI: Jason Barros is a 72 y.o. male who presents for preop for CABG surgery. PMHx CAD s/p prior CABG (vein graft to distal RCA), ICM, HFrEF (LVEF 20%), s/p ICD,  HTN, type 2. He was seen has a consult on Aug 8. Transferred from OSH for a viability study prior to CABG vs complex PCI consideration after he was found to have mvCAD. LHC done in 7.24.2 showed: Left main - 90%, Prox LAD-1 - 80%, mid lesion - 60%, Lcx - 90% w/ a widely patent RCA graft. TTE from 7/25 showed a severely reduced LVEF  of 25% and moderately to severely reduced right ventricular systolic function.     Mr. Barros admits stable angina with minimal walking. Denies SOB, edema, syncope, orthopnea or any other complaints. He has been loaded with plavix and ASA. Currently on plavix 75mg qd and asaa 81qd.      He underwent a LHC  that revealed 3 vessel disease including let main disease.  CTS is being consulted for possible surgical revascularization secondary to multivessel CAD.    Procedure(s) (LRB):  CABG, REPEAT Redo Sternotomy (N/A)  SURGICAL PROCUREMENT, VEIN, ENDOSCOPIC (Left)     Hospital Course: The patient underwent the above listed procedures without immediate complication. Please see the operative note for details. Post operatively he was transferred to the SICU on minimal inotropic support and appropriate hemodynamics. He remained intubated secondary to poor respiratory mechanics but was able to be successfully extubated on POD#2. On POD#3, he developed unstable atrial fibrillation requiring cardioversion with conversion back to normal sinus rhythm. In the evening, he  developed PEA arrest and ACLS initiated by in house providers. Bedside TTE demonstrated questionable tamponade so emergency mediastinal exploration was done at the bedside. There was no evidence of tamponade. Direct cardiac massage was performed for 45mins. He was on max doses of multiple inotropes and vasopressors. ROSC was achieved but ABG revealed severe metabolic acidosis not compatible with life. Bedside LAUREN revealed severely depressed cardiac function. Discussions were had with the patient's family who elected to not escalate care. The patient  shortly after.     Consults:   Consults (From admission, onward)          Status Ordering Provider     Inpatient consult to Cardiac Rehab  Once        Provider:  (Not yet assigned)    Completed JONAS AGUILAR.     Inpatient consult to Registered Dietitian/Nutritionist  Once        Provider:  (Not yet assigned)    Completed JONAS AGUILAR.     Consult to Endocrinology  Once        Provider:  (Not yet assigned)    Completed JONAS AGUILAR.            Significant Diagnostic Studies: Labs: All labs within the past 24 hours have been reviewed    Pending Diagnostic Studies:       None          Final Active Diagnoses:    Diagnosis Date Noted POA    PRINCIPAL PROBLEM:  CAD, multiple vessel [I25.10] 2023 Yes    PVD (peripheral vascular disease) [I73.9] 2023 Unknown    Acute on chronic combined systolic and diastolic heart failure [I50.43] 2023 Yes    Hyperlipidemia [E78.5] 2023 Yes    ICD (implantable cardioverter-defibrillator) in place [Z95.810] 2023 Yes    Type 2 diabetes mellitus [E11.9] 2023 Yes      Problems Resolved During this Admission:      Discharged Condition:     Disposition:     Follow Up:    Patient Instructions:   No discharge procedures on file.  Medications:  None    Justo Holloway MD  Cardiothoracic Surgery  [facility] - Surgical Intensive Care

## 2023-11-24 NOTE — ADDENDUM NOTE
Addendum  created 11/24/23 1329 by Flo Conley MD    Attestation recorded in Intraprocedure, Intraprocedure Attestations filed

## 2024-09-08 NOTE — PLAN OF CARE
07/25/23 1409   Discharge Assessment   Assessment Type Discharge Planning Assessment   Confirmed/corrected address, phone number and insurance Yes   Confirmed Demographics Correct on Facesheet   Source of Information patient;family   When was your last doctors appointment?   (Patient reports July of this year.)   Communicated [place] with patient/caregiver Yes   Reason For Admission Abnormal Stress Test   People in Home spouse;child(annelise), adult;grandchild(annelise)   Do you expect to return to your current living situation? Yes   Do you have help at home or someone to help you manage your care at home? Yes   Who are your caregiver(s) and their phone number(s)? Spouse: Jackie Mcguirepams: [phone]   Prior to hospitilization cognitive status: Unable to Assess   Current cognitive status: Alert/Oriented   Walking or Climbing Stairs ambulation difficulty, requires equipment   Home Accessibility wheelchair accessible   Home Layout Able to live on 1st floor   Equipment Currently Used at Home walker, rolling;cane, straight   Readmission within 30 days? No   Patient currently being followed by outpatient case management? No   Do you currently have service(s) that help you manage your care at home? No   Do you take prescription medications? Yes   Do you have prescription coverage? Yes   Coverage Medicare Part A & B   Do you have any problems affording any of your prescribed medications? No   Is the patient taking medications as prescribed? yes   Who is going to help you get home at discharge? Spouse   How do you get to doctors appointments? car, drives self   Are you on dialysis? No   Do you take coumadin? No  (Per patient's spouse, patient was on Plavix but it was discontinued due to an upcoming sx.)   Discharge Plan A Home with family   Discharge Plan B Home   DME Needed Upon Discharge  none   Discharge Plan discussed with: Patient;Spouse/sig other   Name(s) and Number(s) Spouse: Jackie Katkristopherpams: [phone]   Transition of Care  Barriers None   OTHER   Name(s) of People in Home Patient resides with his wife, adult son and three grandchildren (2 adults and a 15 y.o.)       Patient's PCP is Kingsley Painting Jr.  No barriers to discharge at this time.   Impaired

## (undated) DEVICE — DRAPE SLUSH WARMER WITH DISC

## (undated) DEVICE — TRAY HEART OMC

## (undated) DEVICE — CATH IMPULSE PIGTAIL 5FR 125CM

## (undated) DEVICE — SUT SILK BLK BR. 2 2-60

## (undated) DEVICE — INFLATOR ENCORE 26 BLLN INFL

## (undated) DEVICE — GOWN POLY REINF BRTH SLV XL

## (undated) DEVICE — ELECTRODE REM PLYHSV RETURN 9

## (undated) DEVICE — KIT SAHARA DRAPE DRAW/LIFT

## (undated) DEVICE — WIRE GUIDE INQWIRE STR 180CM

## (undated) DEVICE — TRAY CATH LAB OMC

## (undated) DEVICE — BANDAGE ACE ELASTIC 6"

## (undated) DEVICE — WIRE INTRAMYOCARDIAL TEMP

## (undated) DEVICE — CATH IMPULSE FR4 5FR 125CM

## (undated) DEVICE — COVER PROBE US 5.5X58L NON LTX

## (undated) DEVICE — SUT LIGACLIP SMALL XTRA

## (undated) DEVICE — TAPE SURG MEDIPORE 6X72IN

## (undated) DEVICE — DEVICE PERCLOSE SUT CLSR 6FR

## (undated) DEVICE — GAUZE SPONGE 4X4 12PLY

## (undated) DEVICE — DRAIN CHEST DRY SUCTION

## (undated) DEVICE — SUT 6 18IN STEEL MONO CCS

## (undated) DEVICE — KIT MICROINTRO 4F .018X40X7CM

## (undated) DEVICE — DRESSING TRANS 2X2 TEGADERM

## (undated) DEVICE — HEMOSTAT SURGICEL NU-KNIT 6X9

## (undated) DEVICE — BLADE 4IN EDGE INSULATED

## (undated) DEVICE — DRESSING AQUACEL SACRAL 9 X 9

## (undated) DEVICE — SHEATH INTRODUCER 5F 10CM

## (undated) DEVICE — CATH EAGLE EYE PLATINUM

## (undated) DEVICE — BLADE SAW STERNAL 5/BX

## (undated) DEVICE — SUT PROLENE 4-0 SH BLU 36IN

## (undated) DEVICE — KIT COPILOT VALVE HEMO TOOL

## (undated) DEVICE — Device

## (undated) DEVICE — APPLIER LIGACLIP SM 9.38IN

## (undated) DEVICE — SPONGE GAUZE 16PLY 4X4

## (undated) DEVICE — CATH IMPULSE FL4 5FR 100CM

## (undated) DEVICE — SUT PROLENE 4-0 RB-1 BL MO

## (undated) DEVICE — KIT CUSTOM MANIFOLD

## (undated) DEVICE — PLEDGET SUT SOFT 3/8X3/16X1/16

## (undated) DEVICE — DECANTER FLUID TRNSF WHITE 9IN

## (undated) DEVICE — NDL 18GA X1 1/2 REG BEVEL

## (undated) DEVICE — INSERTS STEALTH FIBRA SIZE 5

## (undated) DEVICE — SUT PROLENE 7-0 BV-1 30

## (undated) DEVICE — DRESSING ADH ISLAND 3.6 X 14

## (undated) DEVICE — CONTRAST ISOVUE 370 500ML MULT

## (undated) DEVICE — SYR 3CC LUER LOC

## (undated) DEVICE — DRAIN CHANNEL ROUND 19FR

## (undated) DEVICE — SUT PROLENE 5-0 BL C-1 4-24

## (undated) DEVICE — OMNIPAQUE 350MG 150ML VIAL

## (undated) DEVICE — ADHESIVE DERMABOND ADVANCED

## (undated) DEVICE — CANNULA VESSEL FREE FLOW

## (undated) DEVICE — PAD DEFIB CADENCE ADULT R2

## (undated) DEVICE — PAD CURAD NONADH 3X4IN

## (undated) DEVICE — TRANSDUCER ADULT DISP

## (undated) DEVICE — SPIKE CONTRAST CONTROLLER

## (undated) DEVICE — SUT VICRYL BR 1 GEN 27 CT-1

## (undated) DEVICE — TUBING HF INSUFFLATION W/ FLTR

## (undated) DEVICE — SYS VIRTUOSAPH PLUS EVM

## (undated) DEVICE — TIP YANKAUERS BULB NO VENT

## (undated) DEVICE — CANNULA DUAL CO2/O2 NASAL 7FT

## (undated) DEVICE — DRESSING TRANS 4X4 TEGADERM

## (undated) DEVICE — GUIDE WIRE BMW 014 X190

## (undated) DEVICE — BANDAGE ELAS SOFTWRAP ST 6X5YD

## (undated) DEVICE — GUIDE LAUNCHER 6FR JL 4.0

## (undated) DEVICE — CLOSURE SKIN STERI STRIP 1/2X4

## (undated) DEVICE — RETRACTOR OCTOBASE INSERT HOLD

## (undated) DEVICE — KIT MANIFOLD LOW PRESS TUBING

## (undated) DEVICE — DRAPE CVMAX SPLIT ANES SCRN

## (undated) DEVICE — BANDAGE ELAS SOFTWRAP ST 4X5YD

## (undated) DEVICE — DRESSING TELFA STRL 4X3 LF

## (undated) DEVICE — CLIP SPRING 6MM

## (undated) DEVICE — SPONGE COTTON TRAY 4X4IN

## (undated) DEVICE — KIT URINARY CATH URINE METER

## (undated) DEVICE — GUIDEWIRE SUPRA CORE 035 190CM

## (undated) DEVICE — SUT 2/0 36IN COATED VICRYL

## (undated) DEVICE — COVER BAND BAG 28 X 12

## (undated) DEVICE — GUIDEWIRE EMERALD 150CM PTFE

## (undated) DEVICE — SUT 4-0 12-18IN SILK BLACK

## (undated) DEVICE — BLOWER MISTER

## (undated) DEVICE — SUT SILK 2-0 SH 18IN BLACK

## (undated) DEVICE — KIT MINI STICK MAX 5F 21GX7CM

## (undated) DEVICE — GLOVE BIOGEL PI MICRO SZ 7.5

## (undated) DEVICE — KIT HAND CONTROL HIGH PRESSUR

## (undated) DEVICE — SYS WASTE FLD DISPOSAL 1400ML

## (undated) DEVICE — SUT VICRYL 3-0 27 SH

## (undated) DEVICE — SHEATH INTRODUCER 6FR 11CM